# Patient Record
Sex: MALE | Race: WHITE | Employment: OTHER | URBAN - METROPOLITAN AREA
[De-identification: names, ages, dates, MRNs, and addresses within clinical notes are randomized per-mention and may not be internally consistent; named-entity substitution may affect disease eponyms.]

---

## 2017-01-09 RX ORDER — VENLAFAXINE HYDROCHLORIDE 75 MG/1
CAPSULE, EXTENDED RELEASE ORAL
Qty: 180 CAPSULE | Refills: 3 | Status: SHIPPED | OUTPATIENT
Start: 2017-01-09 | End: 2017-11-02 | Stop reason: SDUPTHER

## 2017-01-13 ENCOUNTER — PROCEDURE VISIT (OUTPATIENT)
Dept: SURGERY | Facility: CLINIC | Age: 77
End: 2017-01-13
Payer: COMMERCIAL

## 2017-01-13 VITALS
BODY MASS INDEX: 22.54 KG/M2 | HEIGHT: 66 IN | WEIGHT: 140.25 LBS | DIASTOLIC BLOOD PRESSURE: 68 MMHG | TEMPERATURE: 98 F | SYSTOLIC BLOOD PRESSURE: 124 MMHG | HEART RATE: 74 BPM

## 2017-01-13 DIAGNOSIS — D22.61 ATYPICAL NEVUS OF RIGHT SHOULDER: Primary | ICD-10-CM

## 2017-01-13 PROCEDURE — 88307 TISSUE EXAM BY PATHOLOGIST: CPT | Performed by: PATHOLOGY

## 2017-01-13 PROCEDURE — 12032 INTMD RPR S/A/T/EXT 2.6-7.5: CPT | Mod: S$GLB,,, | Performed by: SURGERY

## 2017-01-13 PROCEDURE — 11603 EXC TR-EXT MAL+MARG 2.1-3 CM: CPT | Mod: 51,S$GLB,, | Performed by: SURGERY

## 2017-01-14 NOTE — PROCEDURES
DATE OF PROCEDURE:  Friday, 01/13/2017    PRIMARY SURGEON:  Antonio Palacios M.D.    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    PROCEDURE:  A 5-mm wide local excision of the 1.5 cm moderately atypical nevus   of the right shoulder with specimen of resection measuring 2.5 cm x 6 cm in   length with 2.5 cm needed to achieve appropriately clear margins; primary   closure of the 2.5 x 6 cm defect in multiple layers of absorbable suture using 3   layers of absorbable suture for a 6 cm long incision.    PROCEDURE IN DETAIL:  The patient underwent informed consent.  The area was   confirmed and marked with the patient.  He was brought to the minor procedure   room.  He was placed in a supine position.  The 1.5 cm lesion was on the   superior anterior aspect of his right shoulder.  The area was prepped and draped   in a sterile fashion.  A transverse ellipse was marked to achieve a 5 cm margin   and again the resection specimen measured 2.5 x 6 cm.  Local anesthesia was   infiltrated.  Once appropriately anesthetized, skin incision was made sharply.    Full-thickness skin and subcutaneous tissue was taken.  The underlying fascia   was seen as this was very superficial on the superior anterior aspect of the   shoulder.  The specimen was removed with cautery and oriented with a short   stitch anteriorly and a long stitch on the right lateral side.  It was submitted   to pathology for permanent sectioning.  Hemostasis was achieved with cautery.    The deep dermal and subcutaneous layers were reapproximated with intermittent   deep three-point fixation down to the posterior deep fascial layer to close off   and obliterate any potential dead space to minimize  postoperative fluid seroma   collection and dead space.  Once the subcutaneous tissue was reapproximated, the   deep dermal layer was further reapproximated with interrupted 3-0 Vicryl   sutures as had been the initial deep layer with the skin approximated without   tension.  The skin was then closed with a running 4-0 Monocryl subcuticular skin   closure to achieve a three-layer closure.  The length of the incision was 6 cm.    Mastisol, Steri-Strips, sterile Telfa gauze and Tegaderm dressing were   applied.  Estimated blood loss was minimal.  All needle, instrument and sponge   counts were correct.  The specimen was sent to Pathology for permanent   sectioning.  Postop wound care and analgesic instructions were provided to the   patient.  The patient will follow up in clinic in approximately two weeks,   p.r.n. for wound check and pathology review.  We will also phone review his   pathology report.      TWILA/BOO  dd: 01/14/2017 07:18:47 (CST)  td: 01/14/2017 15:21:38 (CST)  Doc ID   #[id number]  Job ID #[id number]    CC:     Job # [id number]

## 2017-01-14 NOTE — PATIENT INSTRUCTIONS
Keep wound covered for 48 hours.  May shower directly over Tegaderm (plastic).  Remove Tegaderm after 48 hours or sooner if underlying gauze gets wet while washing/showering.  After 48 hours you may shower directly over steri-strips which you can remove after 7-10 days or allow them to fall off on their own. If they fall off sooner, that is ok, and you may shower directly over wound.  May use Tylenol or ibuprofen as directed for pain.  You should have a follow up appointment in 2-3 weeks for wound check and pathology review.  You may need to follow up sooner if you have sutures that require removal.           Please call clinic at [phone] for any questions or signs of infection such as redness, swelling, abnormal drainage,pain, tenderness, or fever.

## 2017-01-17 DIAGNOSIS — Z12.11 SPECIAL SCREENING FOR MALIGNANT NEOPLASMS, COLON: Primary | ICD-10-CM

## 2017-01-17 RX ORDER — SODIUM, POTASSIUM,MAG SULFATES 17.5-3.13G
1 SOLUTION, RECONSTITUTED, ORAL ORAL AS DIRECTED
Qty: 354 ML | Refills: 0 | Status: SHIPPED | OUTPATIENT
Start: 2017-01-17 | End: 2019-04-04 | Stop reason: SDUPTHER

## 2017-01-20 ENCOUNTER — TELEPHONE (OUTPATIENT)
Dept: ENDOSCOPY | Facility: HOSPITAL | Age: 77
End: 2017-01-20

## 2017-01-20 NOTE — TELEPHONE ENCOUNTER
"Spoke with Pt."s wife of her concerns of ingredients in Prep solution causing allergic reaction for Mr. Rooney. Pt. Instructed to speak with Pharmacist regarding specific ingredients with relation to pt"s allergies. She will call back on 01/23/17 if change in prep is needed.. EC  "

## 2017-01-24 ENCOUNTER — TELEPHONE (OUTPATIENT)
Dept: PHARMACY | Facility: CLINIC | Age: 77
End: 2017-01-24

## 2017-02-01 RX ORDER — CIPROFLOXACIN 500 MG/1
TABLET ORAL
Qty: 90 TABLET | Refills: 0 | Status: SHIPPED | OUTPATIENT
Start: 2017-02-01 | End: 2019-03-25

## 2017-02-03 ENCOUNTER — OFFICE VISIT (OUTPATIENT)
Dept: PAIN MEDICINE | Facility: CLINIC | Age: 77
End: 2017-02-03
Attending: ANESTHESIOLOGY
Payer: COMMERCIAL

## 2017-02-03 VITALS
DIASTOLIC BLOOD PRESSURE: 71 MMHG | HEART RATE: 71 BPM | WEIGHT: 150.38 LBS | HEIGHT: 66 IN | RESPIRATION RATE: 18 BRPM | SYSTOLIC BLOOD PRESSURE: 119 MMHG | TEMPERATURE: 99 F | BODY MASS INDEX: 24.17 KG/M2

## 2017-02-03 DIAGNOSIS — K52.9 CHRONIC DIARRHEA: ICD-10-CM

## 2017-02-03 DIAGNOSIS — M47.899 FACET SYNDROME: ICD-10-CM

## 2017-02-03 DIAGNOSIS — K50.80 CROHN'S DISEASE OF BOTH SMALL AND LARGE INTESTINE WITHOUT COMPLICATION: ICD-10-CM

## 2017-02-03 DIAGNOSIS — Z79.899 ENCOUNTER FOR LONG-TERM (CURRENT) USE OF HIGH-RISK MEDICATION: ICD-10-CM

## 2017-02-03 DIAGNOSIS — R53.81 PHYSICAL DECONDITIONING: ICD-10-CM

## 2017-02-03 DIAGNOSIS — F11.20 UNCOMPLICATED OPIOID DEPENDENCE: Primary | ICD-10-CM

## 2017-02-03 DIAGNOSIS — M79.10 MYALGIA: ICD-10-CM

## 2017-02-03 PROCEDURE — 1159F MED LIST DOCD IN RCRD: CPT | Mod: S$GLB,,, | Performed by: ANESTHESIOLOGY

## 2017-02-03 PROCEDURE — 99999 PR PBB SHADOW E&M-EST. PATIENT-LVL IV: CPT | Mod: PBBFAC,,, | Performed by: ANESTHESIOLOGY

## 2017-02-03 PROCEDURE — 3078F DIAST BP <80 MM HG: CPT | Mod: S$GLB,,, | Performed by: ANESTHESIOLOGY

## 2017-02-03 PROCEDURE — 1125F AMNT PAIN NOTED PAIN PRSNT: CPT | Mod: S$GLB,,, | Performed by: ANESTHESIOLOGY

## 2017-02-03 PROCEDURE — 1160F RVW MEDS BY RX/DR IN RCRD: CPT | Mod: S$GLB,,, | Performed by: ANESTHESIOLOGY

## 2017-02-03 PROCEDURE — 3074F SYST BP LT 130 MM HG: CPT | Mod: S$GLB,,, | Performed by: ANESTHESIOLOGY

## 2017-02-03 PROCEDURE — 1157F ADVNC CARE PLAN IN RCRD: CPT | Mod: S$GLB,,, | Performed by: ANESTHESIOLOGY

## 2017-02-03 PROCEDURE — 99214 OFFICE O/P EST MOD 30 MIN: CPT | Mod: S$GLB,,, | Performed by: ANESTHESIOLOGY

## 2017-02-03 RX ORDER — OXYCODONE AND ACETAMINOPHEN 10; 325 MG/1; MG/1
1 TABLET ORAL EVERY 8 HOURS PRN
Qty: 270 TABLET | Refills: 0 | Status: SHIPPED | OUTPATIENT
Start: 2017-02-03 | End: 2017-05-05 | Stop reason: SDUPTHER

## 2017-02-03 NOTE — PROGRESS NOTES
Referring Physician: No ref. provider found    Chief Complaint:   Chief Complaint   Patient presents with    Back Pain     3 month follow up        SUBJECTIVE: Disclaimer: This note has been generated using voice-recognition software. There may be typographical errors that have been missed during proof-reading    Interval History 2/3/2017:  Patient here for follow up of his chronic LBP and medication refill. Requesting 3 month refill of his current prescription of Percocet  PO q8 hr. This dose controls his pain to a level which allows him to be active and do yard work at his house in the [facility]. Pain is rated at a 8-9/10. The pain's intensity and character are stable compared to previous visits as outlined in earlier notes. No negative side effects noted in relation to his pain medication. Remains on Humira for his Crohn's which controls his disease well. 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     Interval History 11/11/16  Patient here for follow up with LBP and medication refill. Patient presents for 6 month medication follow-up. Patient continues to take Percocet  mg PO q 8 hr, which controls his pain. Patient reports that his pain is a 9/10 which is normal for him. Patient's pain remains stable in character and intensity as outlined in in previous encounters. Patient remains active and is able to do yard work outside at his house in the [facility]. Patient does not report of any negative side effects of his pain medication. Patient does not report of any new complications or concerns. Patient remains on Humira for his severe Crohn's disease which has improved his GI symptoms. Patient remains stable on home O2.     Interval History 03/18/2016:  Mr. Rooney presents today for follow up with lower back pain.   He has a history of multiple compression fractures treated in the past.   At the time of his compression fractures, he developed a  right-sided hemidiaphragmatic paralysis and has been on continuous oxygen since.  He is here today for 3 month medication follow up.  He is currently on Percocet with helps with him pain.  He reports that it allows him to do more and denies any adverse effects.   He is currently on Humira for Crohn's which he reports have been helping with his flare ups.  His pain today is a 10/10.     Interval History 12/18/2015:  Patient presents in clinic for up. Mr. Rooney is experiencing lower back pain, and states his pain is 9/10 today. He is currently taking Percocet for his symptoms.  In addition the patient recently had flareup of his Crohn's disease and has been started on Humira which has been helping significantly for his symptoms.Medications continue to medications allow improved functionality for daily living      Interval History 09/25/2015:  Patient presents in clinic for three month follow up. He states his back pain is an 8/10 today. Patient is taking percocet for pain and reports no other health changes since previous encounter.     Interval history 06/12/2015:  Patient in clinic for three month follow up. No health changes since previous encounter, continues to take percoset for pain, no changes in his pain.  Patient is not a current candidate for injections.    Interval History 03/13/2015:  Patient presents in clinic for 3 month follow up. Patient reports back pain is a 9/10 today and is consistently a 9-10/10. Patient currently takes percocet for pain. Patient reports no other health changes since previous encounter.  The medication has been helping him significantly without adverse effects.    Interval History 12/19/2014:  Patient presents in clinic for follow up. Reports back pain is an 8/10 today. Also reports pain in left knee and abdominal LLQ. Patient currently taking percocet 10-325mg for pain. Patient report no other health changes since his previous encounter.    Initial encounter:    Garry Rooney  presents to the clinic for the evaluation of diffuse back pain. The pain started approximately 12 years ago following sustaining 11 compression fractures of the thoraco-lumbar spine secondary to decreased bone density associated with treatment for Crohn's disease.and symptoms have been unchanged.    Brief history: the patient has been maintained on opioid medications for the last 12 years on a stable dose of oxycodone/acetaminophen 10/650 3 times a day.  He has not tried multiple alternative medications.  He has had a previous series of vertebroplasties at approximately 4 levels before being told that further vertebroplasties are not possible.  He has remained fixated on the idea that the opioid medications are the only thing that can help fix his pain and currently he is functional with this regimen.    At the time of his compression fractures the patient developed a right-sided hemidiaphragmatic paralysis and has been on continuous oxygen since.  He does not have any intrinsic lung disease.    Complicating matters, the patient does not live in [place] continuously, he lives for greater than half the year in the [place]  And comes for short periods of time in the next return visit will be March of 2015      Pain Description:    The pain is located throughout the thoacolumbar spine without radiation into the lower extremities..      At BEST  9/10     At WORST  10/10 on the WORST day.      On average pain is rated as 10/10.     Today the pain is rated as 9/10    The pain is described as shooting, throbbing and grabbing and deep      Symptoms interfere with daily activity, sleeping and work.     Exacerbating factors: Sitting, Standing, Laying, Bending, Touching, Coughing/Sneezing, Eating, Walking, Night Time, Morning, Extension, Flexing, Lifting and Getting out of bed/chair.      Mitigating factors medications.     Patient denies night fever/night sweats, urinary incontinence, bowel  incontinence, significant weight loss, significant motor weakness and loss of sensations.  Patient denies any suicidal or homicidal ideations    Pain Medications:  Current:  Venlafaxine 75 mg  Oxycodone/acetaminophen 10/325 3 times a day when necessary    Tried in Past:  NSAIDs -Never  TCA -Never  Anti-convulsants -Never      Physical Therapy/Home Exercise: no       report:  Reviewed and consistent with medication use as prescribed.    Pain Procedures: previous vertebroplasty, nothing recent      Past Medical History   Diagnosis Date    Anemia     Bladder cancer     Cataract     COPD (chronic obstructive pulmonary disease)     Crohn's disease     Depression     Encounter for long-term (current) use of high-risk medication     Hypertension     Osteoporosis, unspecified      Past Surgical History   Procedure Laterality Date    Colon surgery       2002    Colonoscopy       2010    Appendectomy      Hernia repair      Colonoscopy N/A 10/15/2015     Procedure: COLONOSCOPY;  Surgeon: Julio César Lackey MD;  Location: [facility] ([address] [place] [address]);  Service: Endoscopy;  Laterality: N/A;     Social History     Social History    Marital status:      Spouse name: N/A    Number of children: N/A    Years of education: N/A     Occupational History    Not on file.     Social History Main Topics    Smoking status: Never Smoker    Smokeless tobacco: Never Used    Alcohol use Yes      Comment: ocassionally - rarely    Drug use: No    Sexual activity: Not on file     Other Topics Concern    Not on file     Social History Narrative     Family History   Problem Relation Age of Onset    Irritable bowel syndrome Sister     Crohn's disease Neg Hx     Colon cancer Neg Hx     Amblyopia Neg Hx     Blindness Neg Hx     Thyroid disease Neg Hx     Stroke Neg Hx     Glaucoma Neg Hx     Cataracts Neg Hx     Retinal detachment Mother     Diabetes Maternal Aunt     Diabetes Maternal Uncle        Review of patient's  allergies indicates:   Allergen Reactions    Fosamax [alendronate]     Reclast [zoledronic acid-mannitol-water]     Sulfa (sulfonamide antibiotics)        Current Outpatient Prescriptions   Medication Sig    adalimumab (HUMIRA) 40 mg/0.8 mL injection Inject 0.8 mLs (40 mg total) into the skin every 14 (fourteen) days.    ASACOL  mg TbEC TAKE 1 TABLET (800MG TOTAL) THREE TIMES DAILY    azathioprine (IMURAN) 50 mg Tab Take 5 tablets (250 mg total) by mouth once daily.    ciprofloxacin HCl (CIPRO) 500 MG tablet TAKE 1 TABLET TWICE DAILY    ciprofloxacin HCl (CIPRO) 500 MG tablet Take 1 tablet (500 mg total) by mouth once daily.    ciprofloxacin HCl (CIPRO) 500 MG tablet TAKE 1 TABLET  (500  MG  TOTAL) ONE TIME DAILY    finasteride (PROSCAR) 5 mg tablet TAKE 1 TABLET ONE TIME DAILY    HUMIRA PEN PnKt injection INJECT 0.8 MLS (40MG) INTO THE SKIN EVERY 14 DAYS    hyoscyamine (LEVBID) 0.375 mg Tb12 Take 1 tablet (0.375 mg total) by mouth every 12 (twelve) hours.    lisinopril 10 MG tablet TAKE 1 TABLET ONE TIME DAILY    oxycodone-acetaminophen (PERCOCET)  mg per tablet Take 1 tablet by mouth every 8 (eight) hours as needed for Pain.    sodium,potassium,mag sulfates (SUPREP BOWEL PREP KIT) 17.5-3.13-1.6 gram SolR Take 1 kit by mouth as directed.    venlafaxine (EFFEXOR-XR) 75 MG 24 hr capsule TAKE 1 CAPSULE TWICE DAILY     No current facility-administered medications for this visit.        REVIEW OF SYSTEMS:    GENERAL:  No weight loss, malaise or fevers.  RESPIRATORY:  Negative for cough, wheezing or shortness of breath, patient denies any recent URI. Patient is on continuous oxygen secondary to right-sided diaphragmatic paralysis.  CARDIOVASCULAR:  Negative for chest pain, leg swelling or palpitations.  GI:  Crohn's flares are better controlled Humira  MUSCULOSKELETAL:  See HPI.  SKIN:  Negative for lesions, rash, and itching.  PSYCH: Patient has a history of depression.  Patient's sleep is  "disturbed secondary to pain.  HEMATOLOGY/LYMPHOLOGY:  Negative for prolonged bleeding, bruising easily or swollen nodes.  Patient is not currently taking any anti-coagulants  ENDO: No history of diabetes or thyroid dysfunction  NEURO:   No history of headaches, syncope, paralysis, seizures or tremors.  All other reviewed and negative other than HPI.    OBJECTIVE:    Visit Vitals    Resp 18    Ht 5' 6" (1.676 m)    Wt 68.2 kg (150 lb 5.7 oz)    BMI 24.27 kg/m2       PHYSICAL EXAMINATION:    GENERAL: frail and cachectic, in no acute distress, alert and oriented x3.  PSYCH:  Mood and affect appropriate.  SKIN: Skin color, texture, turgor normal, no rashes or lesions.  HEAD/FACE:  Normocephalic, atraumatic.   NECK: Pain to palpation over the cervical paraspinals.  No pain with neck flexion, extension, or lateral flexion.   CV: RRR with palpation of the radial artery.  PULM: Patient on continuous O2.  BACK: Patient with severe kyphosis. There is pain with palpation throughout the paraspinal muscles of the lumbar and thoracic spine.  Limited ROM to flexion and extension with pain reproduction.  EXTREMITIES:No deformities, edema, or skin discoloration. Good capillary refill.  NEURO: cranial nerves grossly intact  GAIT: antalgic, ambulates with cane.    ASSESSMENT: 76 y.o. year old male with back pain, consistent with the following diagnoses    Encounter Diagnoses   Name Primary?    Uncomplicated opioid dependence Yes    Physical deconditioning     Myalgia     Facet syndrome     Crohn's disease of both small and large intestine without complication      PLAN:   No interventions at this time.    Continue Humira    Continue home exercise routine.    Refill Oxycodone/Acetaminophen 10/325 q8 hour PO PRN, quantity #270 for 3 months. His wife will call in 3 months and  an additional prescription.    The patient is here today for a refill of current pain medications and they believe these provide effective pain " control and improvements in quality of life by at least 30%.  The patient notes no serious side effects, and feels the benefits outweigh the risks.  The patient was reminded of the pain contract that they signed previously as well as the risks and benefits of the medication including possible death.  The updated [place] Board [employer] prescription monitoring program was reviewed, and the patient has been found to be compliant with current treatment plan.    Follow up in 3 months for medication refill      Bubba Sandoval MD  PGY-3 Anesthesiology  P: [phone]    Cliff Carrillo  02/03/2017

## 2017-02-03 NOTE — MR AVS SNAPSHOT
[facility]  [address]  [facility] [address]  Phone: [phone]  Fax: [phone]                  Garry Rooney   2/3/2017 3:00 PM   Office Visit    Description:  Male : 1940   Provider:  Cliff Carrillo MD   Department:  [facility]           Reason for Visit     Back Pain           Diagnoses this Visit        Comments    Uncomplicated opioid dependence    -  Primary     Physical deconditioning         Myalgia         Facet syndrome         Crohn's disease of both small and large intestine without complication         Encounter for long-term (current) use of high-risk medication         Chronic diarrhea                To Do List           Your Future Surgeries/Procedures     2017   Surgery with Julio César Lackey MD   [facility]    [address] [facility] [address] [phone]              Goals (5 Years of Data)     None       These Medications        Disp Refills Start End    oxycodone-acetaminophen (PERCOCET)  mg per tablet 270 tablet 0 2/3/2017     Take 1 tablet by mouth every 8 (eight) hours as needed for Pain. - Oral    [facility] - [address] Ph #: [phone]         [facility]     [facility] Nurse Care Line -  Assistance  Registered nurses in the [facility] Center provide clinical advisement, health education, appointment booking, and other advisory services.  Call for this free service at [phone].             Medications           Message regarding Medications     Verify the changes and/or additions to your medication regime listed below are the same as discussed with your clinician today.  If any of these changes or additions are incorrect, please notify your healthcare provider.             Verify that the below list of medications is an accurate representation of the medications you are  "currently taking.  If none reported, the list may be blank. If incorrect, please contact your healthcare provider. Carry this list with you in case of emergency.           Current Medications     adalimumab (HUMIRA) 40 mg/0.8 mL injection Inject 0.8 mLs (40 mg total) into the skin every 14 (fourteen) days.    ASACOL  mg TbEC TAKE 1 TABLET (800MG TOTAL) THREE TIMES DAILY    azathioprine (IMURAN) 50 mg Tab Take 5 tablets (250 mg total) by mouth once daily.    ciprofloxacin HCl (CIPRO) 500 MG tablet TAKE 1 TABLET TWICE DAILY    ciprofloxacin HCl (CIPRO) 500 MG tablet Take 1 tablet (500 mg total) by mouth once daily.    ciprofloxacin HCl (CIPRO) 500 MG tablet TAKE 1 TABLET  (500  MG  TOTAL) ONE TIME DAILY    finasteride (PROSCAR) 5 mg tablet TAKE 1 TABLET ONE TIME DAILY    HUMIRA PEN PnKt injection INJECT 0.8 MLS (40MG) INTO THE SKIN EVERY 14 DAYS    hyoscyamine (LEVBID) 0.375 mg Tb12 Take 1 tablet (0.375 mg total) by mouth every 12 (twelve) hours.    lisinopril 10 MG tablet TAKE 1 TABLET ONE TIME DAILY    oxycodone-acetaminophen (PERCOCET)  mg per tablet Take 1 tablet by mouth every 8 (eight) hours as needed for Pain.    sodium,potassium,mag sulfates (SUPREP BOWEL PREP KIT) 17.5-3.13-1.6 gram SolR Take 1 kit by mouth as directed.    venlafaxine (EFFEXOR-XR) 75 MG 24 hr capsule TAKE 1 CAPSULE TWICE DAILY           Clinical Reference Information           Your Vitals Were     BP Pulse Temp Resp Height Weight    119/71 71 98.9 °F (37.2 °C) (Oral) 18 5' 6" (1.676 m) 68.2 kg (150 lb 5.7 oz)    BMI                24.27 kg/m2          Blood Pressure          Most Recent Value    BP  119/71      Allergies as of 2/3/2017     Fosamax [Alendronate]    Reclast [Zoledronic Acid-mannitol-water]    Sulfa (Sulfonamide Antibiotics)      Immunizations Administered on Date of Encounter - 2/3/2017     None      Language Assistance Services     ATTENTION: Language assistance services are available, free of charge. Please call " [phone].      ATENCIÓN: Si habla español, tiene a palacios disposición servicios gratuitos de asistencia lingüística. Llame al [phone].     CHÚ Ý: N?u b?n nói Ti?ng Vi?t, có các d?ch v? h? tr? ngôn ng? mi?n phí dành cho b?n. G?i s? [phone].         [facility] complies with applicable Federal civil rights laws and does not discriminate on the basis of race, color, national origin, age, disability, or sex.

## 2017-02-11 RX ORDER — MESALAMINE 800 MG/1
TABLET, DELAYED RELEASE ORAL
Qty: 270 TABLET | Refills: 1 | Status: SHIPPED | OUTPATIENT
Start: 2017-02-11 | End: 2017-02-13 | Stop reason: SDUPTHER

## 2017-02-13 ENCOUNTER — TELEPHONE (OUTPATIENT)
Dept: GASTROENTEROLOGY | Facility: CLINIC | Age: 77
End: 2017-02-13

## 2017-02-13 DIAGNOSIS — K50.80 CROHN'S DISEASE OF BOTH SMALL AND LARGE INTESTINE WITHOUT COMPLICATION: Primary | ICD-10-CM

## 2017-02-13 DIAGNOSIS — Z79.899 ENCOUNTER FOR LONG-TERM (CURRENT) USE OF HIGH-RISK MEDICATION: ICD-10-CM

## 2017-02-13 DIAGNOSIS — K50.819 CROHN'S DISEASE OF BOTH SMALL AND LARGE INTESTINE WITH COMPLICATION: ICD-10-CM

## 2017-02-13 RX ORDER — MESALAMINE 800 MG/1
800 TABLET, DELAYED RELEASE ORAL 3 TIMES DAILY
Qty: 45 TABLET | Refills: 1 | Status: SHIPPED | OUTPATIENT
Start: 2017-02-13 | End: 2017-07-13

## 2017-02-13 RX ORDER — CIPROFLOXACIN 500 MG/1
500 TABLET ORAL DAILY
Qty: 90 TABLET | Refills: 0 | Status: SHIPPED | OUTPATIENT
Start: 2017-02-13 | End: 2017-05-14

## 2017-02-13 RX ORDER — MESALAMINE 800 MG/1
800 TABLET, DELAYED RELEASE ORAL 3 TIMES DAILY
Qty: 270 TABLET | Refills: 1 | Status: SHIPPED | OUTPATIENT
Start: 2017-02-13 | End: 2017-07-13 | Stop reason: SDUPTHER

## 2017-02-13 NOTE — TELEPHONE ENCOUNTER
----- Message from Julio César Lackey MD sent at 2/13/2017 10:20 AM CST -----  Contact: Self- [phone]  done  ----- Message -----     From: Cee Nichols MA     Sent: 2/13/2017   8:35 AM       To: MD Romy Dominguez, the pharmacy is listed first in EPIC.  ----- Message -----     From: Estelle Hadley     Sent: 2/13/2017   8:30 AM       To: Ziyad Lackey- pt called to speak with Frida about getting a refill on two rxs- ASACOL  mg TbEC- ciprofloxacin HCl (CIPRO) 500 MG tablet pt has enough for another three weeks- pt uses [facility] - [place], [place] - [address] [phone]  - please call pt back at [phone]

## 2017-02-15 ENCOUNTER — TELEPHONE (OUTPATIENT)
Dept: PHARMACY | Facility: CLINIC | Age: 77
End: 2017-02-15

## 2017-02-16 ENCOUNTER — ANESTHESIA (OUTPATIENT)
Dept: ENDOSCOPY | Facility: HOSPITAL | Age: 77
End: 2017-02-16
Payer: COMMERCIAL

## 2017-02-16 ENCOUNTER — ANESTHESIA EVENT (OUTPATIENT)
Dept: ENDOSCOPY | Facility: HOSPITAL | Age: 77
End: 2017-02-16
Payer: COMMERCIAL

## 2017-02-16 ENCOUNTER — SURGERY (OUTPATIENT)
Age: 77
End: 2017-02-16

## 2017-02-16 VITALS — RESPIRATION RATE: 12 BRPM

## 2017-02-16 PROCEDURE — 25000003 PHARM REV CODE 250: Performed by: NURSE ANESTHETIST, CERTIFIED REGISTERED

## 2017-02-16 PROCEDURE — 63600175 PHARM REV CODE 636 W HCPCS: Performed by: NURSE ANESTHETIST, CERTIFIED REGISTERED

## 2017-02-16 PROCEDURE — D9220A PRA ANESTHESIA: Mod: 33,ANES,, | Performed by: ANESTHESIOLOGY

## 2017-02-16 PROCEDURE — D9220A PRA ANESTHESIA: Mod: 33,CRNA,, | Performed by: NURSE ANESTHETIST, CERTIFIED REGISTERED

## 2017-02-16 RX ORDER — PROPOFOL 10 MG/ML
VIAL (ML) INTRAVENOUS
Status: DISCONTINUED | OUTPATIENT
Start: 2017-02-16 | End: 2017-02-16

## 2017-02-16 RX ORDER — PROPOFOL 10 MG/ML
VIAL (ML) INTRAVENOUS CONTINUOUS PRN
Status: DISCONTINUED | OUTPATIENT
Start: 2017-02-16 | End: 2017-02-16

## 2017-02-16 RX ORDER — LIDOCAINE HCL/PF 100 MG/5ML
SYRINGE (ML) INTRAVENOUS
Status: DISCONTINUED | OUTPATIENT
Start: 2017-02-16 | End: 2017-02-16

## 2017-02-16 RX ADMIN — PROPOFOL 125 MCG/KG/MIN: 10 INJECTION, EMULSION INTRAVENOUS at 07:02

## 2017-02-16 RX ADMIN — PROPOFOL 80 MG: 10 INJECTION, EMULSION INTRAVENOUS at 07:02

## 2017-02-16 RX ADMIN — LIDOCAINE HYDROCHLORIDE 50 MG: 20 INJECTION, SOLUTION INTRAVENOUS at 07:02

## 2017-02-16 NOTE — ANESTHESIA PREPROCEDURE EVALUATION
02/16/2017  Garry Rooney is a 76 y.o., male.    [facility] Anesthesia Evaluation    I have reviewed the Patient Summary Reports.        Review of Systems  Anesthesia Hx:  No problems with previous Anesthesia  History of prior surgery of interest to airway management or planning:  Denies Personal Hx of Anesthesia complications.   Cardiovascular:   Hypertension Denies MI.  Denies CAD.    Denies CABG/stent.     Pulmonary:   COPD 2 liters home oxygen   Renal/:  Renal/ Normal     Hepatic/GI:   Bowel Prep. Denies GERD.    Neurological:   Neuromuscular Disease,    Endocrine:  Endocrine Normal    Psych:   Psychiatric History depression          Physical Exam  General:  Well nourished    Airway/Jaw/Neck:  Airway Findings: Mouth Opening: Normal General Airway Assessment: Adult  Mallampati: I      Dental:  Dental Findings: Edentulous   Chest/Lungs:  Chest/Lungs Clear    Heart/Vascular:  Heart Findings: Normal            Anesthesia Plan  Type of Anesthesia, risks & benefits discussed:  Anesthesia Type:  general  Patient's Preference:   Intra-op Monitoring Plan:   Intra-op Monitoring Plan Comments:   Post Op Pain Control Plan:   Post Op Pain Control Plan Comments:   Induction:   IV  Beta Blocker:  Patient is not currently on a Beta-Blocker (No further documentation required).       Informed Consent: Patient understands risks and agrees with Anesthesia plan.  Questions answered. Anesthesia consent signed with patient.  ASA Score: 3     Day of Surgery Review of History & Physical:    H&P update referred to the provider.         Ready For Surgery From Anesthesia Perspective.

## 2017-02-16 NOTE — TRANSFER OF CARE
"Anesthesia Transfer of Care Note    Patient: Garry Rooney    Procedure(s) Performed: Procedure(s) (LRB):  COLONOSCOPY (N/A)    Patient location: PACU    Anesthesia Type: general    Transport from OR: Transported from OR on 2-3 L/min O2 by NC with adequate spontaneous ventilation    Post pain: adequate analgesia    Post assessment: no apparent anesthetic complications    Post vital signs: stable    Level of consciousness: awake    Nausea/Vomiting: no nausea/vomiting    Complications: none          Last vitals:   Visit Vitals    BP (!) 95/53    Pulse 78    Temp 36.8 °C (98.3 °F)    Resp 16    Ht 5' 5" (1.651 m)    Wt 60 kg (132 lb 5 oz)    SpO2 98%    BMI 22.02 kg/m2     "

## 2017-02-16 NOTE — ANESTHESIA POSTPROCEDURE EVALUATION
"Anesthesia Post Evaluation    Patient: Garry Rooney    Procedure(s) Performed: Procedure(s) (LRB):  COLONOSCOPY (N/A)    Final Anesthesia Type: general  Patient location during evaluation: GI PACU  Patient participation: Yes- Able to Participate  Level of consciousness: awake and alert and oriented  Post-procedure vital signs: reviewed and stable  Pain management: adequate  Airway patency: patent  PONV status at discharge: No PONV  Anesthetic complications: no      Cardiovascular status: blood pressure returned to baseline  Respiratory status: unassisted and spontaneous ventilation  Hydration status: euvolemic  Follow-up not needed.        Visit Vitals    /62    Pulse 77    Temp 36.8 °C (98.3 °F)    Resp 18    Ht 5' 5" (1.651 m)    Wt 60 kg (132 lb 5 oz)    SpO2 97%    BMI 22.02 kg/m2       Pain/Arabella Score: Pain Assessment Performed: Yes (2/16/2017  8:38 AM)  Presence of Pain: denies (2/16/2017  8:38 AM)  Arabella Score: 10 (2/16/2017  8:23 AM)      "

## 2017-02-17 ENCOUNTER — TELEPHONE (OUTPATIENT)
Dept: GASTROENTEROLOGY | Facility: CLINIC | Age: 77
End: 2017-02-17

## 2017-02-17 NOTE — TELEPHONE ENCOUNTER
----- Message from Julio César Lackey MD sent at 2/17/2017  9:42 AM CST -----  Please call, biopsy shows inflammation, but nothing unexpected

## 2017-02-21 ENCOUNTER — TELEPHONE (OUTPATIENT)
Dept: PHARMACY | Facility: CLINIC | Age: 77
End: 2017-02-21

## 2017-02-23 ENCOUNTER — TELEPHONE (OUTPATIENT)
Dept: ENDOSCOPY | Facility: HOSPITAL | Age: 77
End: 2017-02-23

## 2017-02-27 ENCOUNTER — TELEPHONE (OUTPATIENT)
Dept: PHARMACY | Facility: CLINIC | Age: 77
End: 2017-02-27

## 2017-02-27 NOTE — TELEPHONE ENCOUNTER
Patient's wife called to request we fill a 6 month supply of Mr. Rooney's Humira. She said she just got off the phone with the insurance and it will go into effect on [place] 3/5. I will run them on Monday 3/6 to make sure they all go through. She would like us to ship them out on Monday 3/6 to receive 3/7.

## 2017-03-06 ENCOUNTER — TELEPHONE (OUTPATIENT)
Dept: GASTROENTEROLOGY | Facility: CLINIC | Age: 77
End: 2017-03-06

## 2017-05-04 ENCOUNTER — TELEPHONE (OUTPATIENT)
Dept: PAIN MEDICINE | Facility: CLINIC | Age: 77
End: 2017-05-04

## 2017-05-04 NOTE — TELEPHONE ENCOUNTER
Spoke with pt wife Anne regarding pain medication refill        ----- Message from Gordy Bartholomew sent at 5/4/2017  2:00 PM CDT -----  Contact: Anne, patient's wife  X_  1st Request  _  2nd Request  _  3rd Request        Who: Anne, patient's wife    Why: Patient's wife called to state that patient is in need of a new prescription. Please call back to follow up.    What Number to Call Back: [phone] or [phone]    When to Expect a call back: (Before the end of the day)   -- if the call is after 12:00, the call back will be tomorrow.

## 2017-05-05 ENCOUNTER — TELEPHONE (OUTPATIENT)
Dept: PAIN MEDICINE | Facility: CLINIC | Age: 77
End: 2017-05-05

## 2017-05-05 DIAGNOSIS — Z79.899 ENCOUNTER FOR LONG-TERM (CURRENT) USE OF HIGH-RISK MEDICATION: ICD-10-CM

## 2017-05-05 DIAGNOSIS — M79.10 MYALGIA: ICD-10-CM

## 2017-05-05 DIAGNOSIS — K50.80 CROHN'S DISEASE OF BOTH SMALL AND LARGE INTESTINE WITHOUT COMPLICATION: ICD-10-CM

## 2017-05-05 DIAGNOSIS — M47.899 FACET SYNDROME: ICD-10-CM

## 2017-05-05 DIAGNOSIS — K52.9 CHRONIC DIARRHEA: ICD-10-CM

## 2017-05-05 DIAGNOSIS — R53.81 PHYSICAL DECONDITIONING: ICD-10-CM

## 2017-05-05 DIAGNOSIS — F11.20 UNCOMPLICATED OPIOID DEPENDENCE: ICD-10-CM

## 2017-05-05 RX ORDER — OXYCODONE AND ACETAMINOPHEN 10; 325 MG/1; MG/1
1 TABLET ORAL EVERY 8 HOURS PRN
Qty: 270 TABLET | Refills: 0 | Status: SHIPPED | OUTPATIENT
Start: 2017-05-05 | End: 2017-09-27 | Stop reason: SDUPTHER

## 2017-05-05 NOTE — TELEPHONE ENCOUNTER
Patient wife contacted office for a refill on his medicaton. Last filled  02/15/2017. Three month supply is given. Last office visit 02/03/2017. You okayed patient to have 6 month office visit.

## 2017-06-19 RX ORDER — CIPROFLOXACIN 500 MG/1
TABLET ORAL
Qty: 90 TABLET | Refills: 0 | Status: SHIPPED | OUTPATIENT
Start: 2017-06-19 | End: 2018-12-03 | Stop reason: SDUPTHER

## 2017-07-10 ENCOUNTER — TELEPHONE (OUTPATIENT)
Dept: GASTROENTEROLOGY | Facility: CLINIC | Age: 77
End: 2017-07-10

## 2017-07-10 NOTE — TELEPHONE ENCOUNTER
----- Message from Ivana Hsu MA sent at 7/10/2017 10:25 AM CDT -----  Contact: Mrs. Rooney  spouse  [phone]  States she needs to speak to you asap regarding her spouses medications that needs to be sent to her.  States she needs to speak to you 1st due to it needing to be sent to her sons address.      States the medications are cipro 500 mg #90 x3 refills.   And not generic for Asacol  mg #270 x 3 refills.  States they are willing to pay for the non generic of this medication since the non generic works better.

## 2017-07-13 ENCOUNTER — TELEPHONE (OUTPATIENT)
Dept: GASTROENTEROLOGY | Facility: CLINIC | Age: 77
End: 2017-07-13

## 2017-07-13 DIAGNOSIS — K50.80 CROHN'S DISEASE OF BOTH SMALL AND LARGE INTESTINE WITHOUT COMPLICATION: ICD-10-CM

## 2017-07-13 RX ORDER — MESALAMINE 800 MG/1
800 TABLET, DELAYED RELEASE ORAL 3 TIMES DAILY
Qty: 270 TABLET | Refills: 0 | Status: SHIPPED | OUTPATIENT
Start: 2017-07-13 | End: 2017-08-18 | Stop reason: SDUPTHER

## 2017-07-14 NOTE — TELEPHONE ENCOUNTER
Patient of Dr. Lackey's with Crohn's.  Received request for medication refills: cipro and Asacol.  Chart reviewed.  The last note stated that Dr. Lackey wanted to stop the Asacol, but it appears to have been refilled since then.  He also was trying to taper off the Cipro.  It is unclear to me if Cipro is still warranted.  I will refill the Asacol for now, but he needs to check back with Dr. Lackey regarding ongoing use.  I am not refilling the Cipro at this time.

## 2017-08-09 ENCOUNTER — TELEPHONE (OUTPATIENT)
Dept: PHARMACY | Facility: CLINIC | Age: 77
End: 2017-08-09
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
## 2017-08-18 DIAGNOSIS — K50.80 CROHN'S DISEASE OF BOTH SMALL AND LARGE INTESTINE WITHOUT COMPLICATION: ICD-10-CM

## 2017-08-21 RX ORDER — MESALAMINE 800 MG/1
TABLET, DELAYED RELEASE ORAL
Qty: 180 TABLET | Refills: 0 | Status: SHIPPED | OUTPATIENT
Start: 2017-08-21 | End: 2017-12-05 | Stop reason: SDUPTHER

## 2017-09-21 ENCOUNTER — TELEPHONE (OUTPATIENT)
Dept: GASTROENTEROLOGY | Facility: CLINIC | Age: 77
End: 2017-09-21
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
## 2017-09-25 ENCOUNTER — TELEPHONE (OUTPATIENT)
Dept: PHARMACY | Facility: CLINIC | Age: 77
End: 2017-09-25

## 2017-09-27 ENCOUNTER — OFFICE VISIT (OUTPATIENT)
Dept: PAIN MEDICINE | Facility: CLINIC | Age: 77
End: 2017-09-27
Payer: MEDICARE

## 2017-09-27 VITALS
TEMPERATURE: 98 F | SYSTOLIC BLOOD PRESSURE: 123 MMHG | WEIGHT: 132.25 LBS | HEIGHT: 65 IN | BODY MASS INDEX: 22.03 KG/M2 | DIASTOLIC BLOOD PRESSURE: 67 MMHG | HEART RATE: 75 BPM

## 2017-09-27 DIAGNOSIS — R53.81 PHYSICAL DECONDITIONING: ICD-10-CM

## 2017-09-27 DIAGNOSIS — K50.80 CROHN'S DISEASE OF BOTH SMALL AND LARGE INTESTINE WITHOUT COMPLICATION: ICD-10-CM

## 2017-09-27 DIAGNOSIS — F11.20 UNCOMPLICATED OPIOID DEPENDENCE: ICD-10-CM

## 2017-09-27 DIAGNOSIS — Z79.899 ENCOUNTER FOR LONG-TERM (CURRENT) USE OF HIGH-RISK MEDICATION: ICD-10-CM

## 2017-09-27 DIAGNOSIS — M47.899 FACET SYNDROME: Primary | ICD-10-CM

## 2017-09-27 DIAGNOSIS — M79.10 MYALGIA: ICD-10-CM

## 2017-09-27 DIAGNOSIS — K52.9 CHRONIC DIARRHEA: ICD-10-CM

## 2017-09-27 PROCEDURE — 99213 OFFICE O/P EST LOW 20 MIN: CPT | Mod: S$GLB,,, | Performed by: NURSE PRACTITIONER

## 2017-09-27 PROCEDURE — 3008F BODY MASS INDEX DOCD: CPT | Mod: S$GLB,,, | Performed by: NURSE PRACTITIONER

## 2017-09-27 PROCEDURE — 3078F DIAST BP <80 MM HG: CPT | Mod: S$GLB,,, | Performed by: NURSE PRACTITIONER

## 2017-09-27 PROCEDURE — 99999 PR PBB SHADOW E&M-EST. PATIENT-LVL III: CPT | Mod: PBBFAC,,, | Performed by: NURSE PRACTITIONER

## 2017-09-27 PROCEDURE — 1125F AMNT PAIN NOTED PAIN PRSNT: CPT | Mod: S$GLB,,, | Performed by: NURSE PRACTITIONER

## 2017-09-27 PROCEDURE — 1159F MED LIST DOCD IN RCRD: CPT | Mod: S$GLB,,, | Performed by: NURSE PRACTITIONER

## 2017-09-27 PROCEDURE — 3074F SYST BP LT 130 MM HG: CPT | Mod: S$GLB,,, | Performed by: NURSE PRACTITIONER

## 2017-09-27 RX ORDER — OXYCODONE AND ACETAMINOPHEN 10; 325 MG/1; MG/1
1 TABLET ORAL EVERY 8 HOURS PRN
Qty: 270 TABLET | Refills: 0 | Status: SHIPPED | OUTPATIENT
Start: 2017-09-27 | End: 2018-01-19 | Stop reason: SDUPTHER
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
## 2017-10-03 ENCOUNTER — TELEPHONE (OUTPATIENT)
Dept: GASTROENTEROLOGY | Facility: CLINIC | Age: 77
End: 2017-10-03

## 2017-10-03 NOTE — TELEPHONE ENCOUNTER
----- Message from Julio César Lackey MD sent at 10/3/2017 10:10 AM CDT -----  Contact: Wife- Anne- [phone]  How about 10/31 at 4 pm  ----- Message -----  From: Cee Nichols MA  Sent: 9/28/2017  10:02 AM  To: Julio César Lackey MD     Needs to reschedule his 12/11 appointment with you.  Going on a cruise.  Would like to be seen sooner.  Will not be available 11/11 thru 11/23.  Going back to [facility] to assess the situation. Would you be able to work him in somewhere.  Mrs. Rooney states he really needs to see you.  ----- Message -----  From: Estelle Hadley  Sent: 9/28/2017   9:39 AM  To: Ziyad Lackey- pts wife called to reschedule his upcoming appt- originally for 12/11- pt will be out of town- wanted to know if he can have an earlier appt if possible- please call Anne back at [phone]

## 2017-10-17 ENCOUNTER — TELEPHONE (OUTPATIENT)
Dept: PHARMACY | Facility: CLINIC | Age: 77
End: 2017-10-17

## 2017-10-31 ENCOUNTER — LAB VISIT (OUTPATIENT)
Dept: LAB | Facility: HOSPITAL | Age: 77
End: 2017-10-31
Attending: INTERNAL MEDICINE
Payer: MEDICARE

## 2017-10-31 ENCOUNTER — OFFICE VISIT (OUTPATIENT)
Dept: GASTROENTEROLOGY | Facility: CLINIC | Age: 77
End: 2017-10-31
Payer: MEDICARE

## 2017-10-31 VITALS
HEIGHT: 64 IN | DIASTOLIC BLOOD PRESSURE: 78 MMHG | BODY MASS INDEX: 22.99 KG/M2 | SYSTOLIC BLOOD PRESSURE: 130 MMHG | HEART RATE: 85 BPM | WEIGHT: 134.69 LBS

## 2017-10-31 DIAGNOSIS — K50.819 CROHN'S DISEASE OF BOTH SMALL AND LARGE INTESTINE WITH COMPLICATION: Primary | ICD-10-CM

## 2017-10-31 DIAGNOSIS — Z79.899 ENCOUNTER FOR LONG-TERM (CURRENT) USE OF HIGH-RISK MEDICATION: ICD-10-CM

## 2017-10-31 DIAGNOSIS — K50.819 CROHN'S DISEASE OF BOTH SMALL AND LARGE INTESTINE WITH COMPLICATION: ICD-10-CM

## 2017-10-31 LAB
ALBUMIN SERPL BCP-MCNC: 3.3 G/DL
ALP SERPL-CCNC: 71 U/L
ALT SERPL W/O P-5'-P-CCNC: 13 U/L
ANION GAP SERPL CALC-SCNC: 7 MMOL/L
AST SERPL-CCNC: 22 U/L
BASOPHILS # BLD AUTO: 0.02 K/UL
BASOPHILS NFR BLD: 0.3 %
BILIRUB SERPL-MCNC: 0.6 MG/DL
BUN SERPL-MCNC: 15 MG/DL
CALCIUM SERPL-MCNC: 8.9 MG/DL
CHLORIDE SERPL-SCNC: 104 MMOL/L
CO2 SERPL-SCNC: 30 MMOL/L
CREAT SERPL-MCNC: 0.8 MG/DL
CRP SERPL-MCNC: 8.5 MG/L
DIFFERENTIAL METHOD: ABNORMAL
EOSINOPHIL # BLD AUTO: 0.1 K/UL
EOSINOPHIL NFR BLD: 1.9 %
ERYTHROCYTE [DISTWIDTH] IN BLOOD BY AUTOMATED COUNT: 18.3 %
EST. GFR  (AFRICAN AMERICAN): >60 ML/MIN/1.73 M^2
EST. GFR  (NON AFRICAN AMERICAN): >60 ML/MIN/1.73 M^2
GLUCOSE SERPL-MCNC: 98 MG/DL
HCT VFR BLD AUTO: 36 %
HGB BLD-MCNC: 11.9 G/DL
IMM GRANULOCYTES # BLD AUTO: 0.04 K/UL
IMM GRANULOCYTES NFR BLD AUTO: 0.6 %
LYMPHOCYTES # BLD AUTO: 1.2 K/UL
LYMPHOCYTES NFR BLD: 17 %
MCH RBC QN AUTO: 37.1 PG
MCHC RBC AUTO-ENTMCNC: 33.1 G/DL
MCV RBC AUTO: 112 FL
MONOCYTES # BLD AUTO: 0.3 K/UL
MONOCYTES NFR BLD: 4.6 %
NEUTROPHILS # BLD AUTO: 5.3 K/UL
NEUTROPHILS NFR BLD: 75.6 %
NRBC BLD-RTO: 0 /100 WBC
PLATELET # BLD AUTO: 248 K/UL
PMV BLD AUTO: 10.7 FL
POTASSIUM SERPL-SCNC: 4.4 MMOL/L
PROT SERPL-MCNC: 6.8 G/DL
RBC # BLD AUTO: 3.21 M/UL
SODIUM SERPL-SCNC: 141 MMOL/L
WBC # BLD AUTO: 6.96 K/UL

## 2017-10-31 PROCEDURE — 99215 OFFICE O/P EST HI 40 MIN: CPT | Mod: S$GLB,,, | Performed by: INTERNAL MEDICINE

## 2017-10-31 PROCEDURE — 86140 C-REACTIVE PROTEIN: CPT

## 2017-10-31 PROCEDURE — 82542 COL CHROMOTOGRAPHY QUAL/QUAN: CPT | Mod: 91

## 2017-10-31 PROCEDURE — 80053 COMPREHEN METABOLIC PANEL: CPT

## 2017-10-31 PROCEDURE — 36415 COLL VENOUS BLD VENIPUNCTURE: CPT

## 2017-10-31 PROCEDURE — 85025 COMPLETE CBC W/AUTO DIFF WBC: CPT

## 2017-10-31 PROCEDURE — 99999 PR PBB SHADOW E&M-EST. PATIENT-LVL III: CPT | Mod: PBBFAC,,, | Performed by: INTERNAL MEDICINE
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
## 2017-11-01 ENCOUNTER — LAB VISIT (OUTPATIENT)
Dept: LAB | Facility: OTHER | Age: 77
End: 2017-11-01
Attending: INTERNAL MEDICINE
Payer: MEDICARE

## 2017-11-01 DIAGNOSIS — K50.819 CROHN'S DISEASE OF BOTH SMALL AND LARGE INTESTINE WITH COMPLICATION: ICD-10-CM

## 2017-11-01 LAB
C DIFF GDH STL QL: NEGATIVE
C DIFF TOX A+B STL QL IA: NEGATIVE

## 2017-11-01 PROCEDURE — 87449 NOS EACH ORGANISM AG IA: CPT

## 2017-11-01 PROCEDURE — 87329 GIARDIA AG IA: CPT

## 2017-11-01 PROCEDURE — 87209 SMEAR COMPLEX STAIN: CPT

## 2017-11-02 ENCOUNTER — TELEPHONE (OUTPATIENT)
Dept: GASTROENTEROLOGY | Facility: CLINIC | Age: 77
End: 2017-11-02

## 2017-11-02 DIAGNOSIS — K50.819 CROHN'S DISEASE OF SMALL AND LARGE INTESTINES WITH COMPLICATION: Primary | ICD-10-CM

## 2017-11-02 LAB
CRYPTOSP AG STL QL IA: NEGATIVE
G LAMBLIA AG STL QL IA: NEGATIVE

## 2017-11-02 NOTE — TELEPHONE ENCOUNTER
----- Message from Tara Katz sent at 11/2/2017  1:38 PM CDT -----  Contact: wife - torrie perez - [phone]  Ziyad - returning  Your call - when is patient supposed to see dr alcala - please call wife - torrie Landon [phone]

## 2017-11-02 NOTE — TELEPHONE ENCOUNTER
----- Message from Mariel Christy sent at 11/2/2017  1:00 PM CDT -----  Contact: pt#[phone]  Pt is returning your call. Please call back

## 2017-11-02 NOTE — TELEPHONE ENCOUNTER
----- Message from Choco Prasad sent at 11/2/2017  2:04 PM CDT -----      _  1st Request  _  2nd Request  _  3rd Request    Please refill the medication(s) listed below. Please call the patient when the prescription(s) is ready for  at this phone number    [phone] (Phone)        Medication #1 : lisinopril 10 MG tablet      Medication #2: venlafaxine (EFFEXOR-XR) 75 MG 24 hr capsule    Medication #3 :  finasteride (PROSCAR) 5 mg tablet            Preferred Pharmacy:[facility] - [facility] [address]

## 2017-11-02 NOTE — TELEPHONE ENCOUNTER
Returned call.  Aware lab work has been scheduled for next Friday 11/10 for 10:00.   Will mail confirmation.

## 2017-11-03 LAB — O+P STL TRI STN: NORMAL

## 2017-11-03 RX ORDER — LISINOPRIL 10 MG/1
TABLET ORAL
Qty: 90 TABLET | Refills: 0 | Status: SHIPPED | OUTPATIENT
Start: 2017-11-03 | End: 2018-01-16 | Stop reason: SDUPTHER

## 2017-11-03 RX ORDER — FINASTERIDE 5 MG/1
TABLET, FILM COATED ORAL
Qty: 90 TABLET | Refills: 0 | Status: SHIPPED | OUTPATIENT
Start: 2017-11-03 | End: 2018-01-16 | Stop reason: SDUPTHER

## 2017-11-03 RX ORDER — VENLAFAXINE HYDROCHLORIDE 75 MG/1
CAPSULE, EXTENDED RELEASE ORAL
Qty: 180 CAPSULE | Refills: 0 | Status: SHIPPED | OUTPATIENT
Start: 2017-11-03 | End: 2018-01-16 | Stop reason: SDUPTHER

## 2017-11-05 LAB — PROMETHEUS THIOPURINE METABOLITES: NORMAL

## 2017-11-06 ENCOUNTER — TELEPHONE (OUTPATIENT)
Dept: GASTROENTEROLOGY | Facility: CLINIC | Age: 77
End: 2017-11-06

## 2017-11-09 ENCOUNTER — TELEPHONE (OUTPATIENT)
Dept: PHARMACY | Facility: CLINIC | Age: 77
End: 2017-11-09

## 2017-11-10 ENCOUNTER — LAB VISIT (OUTPATIENT)
Dept: LAB | Facility: HOSPITAL | Age: 77
End: 2017-11-10
Attending: INTERNAL MEDICINE
Payer: MEDICARE

## 2017-11-10 DIAGNOSIS — K50.819 CROHN'S DISEASE OF SMALL AND LARGE INTESTINES WITH COMPLICATION: ICD-10-CM

## 2017-11-10 PROCEDURE — 80299 QUANTITATIVE ASSAY DRUG: CPT

## 2017-11-10 PROCEDURE — 36415 COLL VENOUS BLD VENIPUNCTURE: CPT

## 2017-11-12 ENCOUNTER — PATIENT MESSAGE (OUTPATIENT)
Dept: GASTROENTEROLOGY | Facility: CLINIC | Age: 77
End: 2017-11-12

## 2017-11-16 ENCOUNTER — TELEPHONE (OUTPATIENT)
Dept: GASTROENTEROLOGY | Facility: CLINIC | Age: 77
End: 2017-11-16

## 2017-11-17 LAB — ADALIMUMAB CONCENTRATION & ANTI-ADALIMUMAB ANTIBODY: NORMAL

## 2017-11-22 ENCOUNTER — TELEPHONE (OUTPATIENT)
Dept: GASTROENTEROLOGY | Facility: CLINIC | Age: 77
End: 2017-11-22

## 2017-11-22 NOTE — TELEPHONE ENCOUNTER
----- Message from Estelle Hadley sent at 11/22/2017 12:12 PM CST -----  Contact: Wife- Anne- [phone]  Ziyad- pts wife called to speak with Frida to discuss the pts medications-please call Anne back at [phone]

## 2017-11-27 DIAGNOSIS — Z79.899 ENCOUNTER FOR LONG-TERM (CURRENT) USE OF HIGH-RISK MEDICATION: ICD-10-CM

## 2017-11-27 DIAGNOSIS — K50.819 CROHN'S DISEASE OF BOTH SMALL AND LARGE INTESTINE WITH COMPLICATION: ICD-10-CM

## 2017-11-27 RX ORDER — AZATHIOPRINE 50 MG/1
TABLET ORAL
Qty: 450 TABLET | Refills: 3 | Status: SHIPPED | OUTPATIENT
Start: 2017-11-27 | End: 2018-12-03 | Stop reason: SDUPTHER

## 2017-11-27 RX ORDER — CIPROFLOXACIN 500 MG/1
TABLET ORAL
Qty: 90 TABLET | Refills: 1 | Status: SHIPPED | OUTPATIENT
Start: 2017-11-27 | End: 2019-03-25

## 2017-11-27 RX ORDER — HYOSCYAMINE SULFATE 0.38 MG/1
TABLET, EXTENDED RELEASE ORAL
Qty: 180 TABLET | Refills: 3 | Status: SHIPPED | OUTPATIENT
Start: 2017-11-27 | End: 2018-12-03 | Stop reason: SDUPTHER

## 2017-11-28 ENCOUNTER — TELEPHONE (OUTPATIENT)
Dept: PHARMACY | Facility: CLINIC | Age: 77
End: 2017-11-28

## 2017-11-28 ENCOUNTER — PATIENT OUTREACH (OUTPATIENT)
Dept: INTERNAL MEDICINE | Facility: CLINIC | Age: 77
End: 2017-11-28

## 2017-11-28 NOTE — TELEPHONE ENCOUNTER
----- Message from Julio César Lackey MD sent at 11/28/2017 10:15 AM CST -----  Contact: Self- [phone]  I thought I just did a new rx ;with the higher dose of once weekly  ----- Message -----  From: Cee Nichols MA  Sent: 11/28/2017   9:59 AM  To: Julio César Lackey MD        ----- Message -----  From: Estelle Hadley  Sent: 11/28/2017   9:55 AM  To: Ziyad WATKINS Staff                                  Prescription Refill Request  Name of medication and dose [facility]     Pharmacy [facility]     Are you completely out of your medication Yes    Additional Information:

## 2017-11-28 NOTE — PROGRESS NOTES
[facility] is committed to your overall health.  To help you get the most out of each of your visits, we will review your information to make sure you are up to date on all of your recommended tests and/or procedures.       Your PCP     found that you may be due for:       Health Maintenance Due   Topic Date Due    Zoster Vaccine  11/25/1998    Lipid Panel  10/28/2016    Pneumococcal (65+) (2 of 2 - PPSV23) 04/06/2017    Influenza Vaccine  08/01/2017     Medicare does not cover all immunizations to be given in the clinic. Check your benefits to ensure that you do not need to receive your immunizations at the pharmacy.  You are more than welcome to visit our pharmacy here on campus to receive your vaccinations on the same day of your upcoming scheduled visit.           If you have had any of the above done at another facility, please bring the records or information with you so that your record at [facility] will be complete.  If you would like to schedule any of these, please contact me.     If you are currently taking medication, please bring it with you to your appointment for review.     Also, if you have any type of Advanced Directives, please bring them with you to your office visit so we may scan them into your chart.     Thank you for Choosing [facility] for your healthcare needs.      Additional Information  If you have questions, you can email [email or web address] or call [phone]  to talk to our [facility] staff. Remember, [facility] is NOT to be used for urgent needs. For medical emergencies, dial 911.

## 2017-11-29 ENCOUNTER — TELEPHONE (OUTPATIENT)
Dept: PHARMACY | Facility: CLINIC | Age: 77
End: 2017-11-29

## 2017-11-29 NOTE — TELEPHONE ENCOUNTER
FOR DOCUMENTATION ONLY:  Humira prior authorization (weekly dosing) approved x 2 years.  11/29/17 through 11/29/19  Auth # [id number]

## 2017-12-05 DIAGNOSIS — K50.80 CROHN'S DISEASE OF BOTH SMALL AND LARGE INTESTINE WITHOUT COMPLICATION: ICD-10-CM

## 2017-12-05 RX ORDER — MESALAMINE 800 MG/1
800 TABLET, DELAYED RELEASE ORAL 3 TIMES DAILY
Qty: 180 TABLET | Refills: 0 | Status: SHIPPED | OUTPATIENT
Start: 2017-12-05 | End: 2018-01-31 | Stop reason: SDUPTHER

## 2017-12-20 ENCOUNTER — TELEPHONE (OUTPATIENT)
Dept: PHARMACY | Facility: CLINIC | Age: 77
End: 2017-12-20

## 2018-01-04 ENCOUNTER — PATIENT OUTREACH (OUTPATIENT)
Dept: INTERNAL MEDICINE | Facility: CLINIC | Age: 78
End: 2018-01-04

## 2018-01-04 NOTE — PROGRESS NOTES
[facility] is committed to your overall health.  To help you get the most out of each of your visits, we will review your information to make sure you are up to date on all of your recommended tests and/or procedures.       Your PCP    found that you may be due for:       Health Maintenance Due   Topic Date Due    Zoster Vaccine  11/25/1998    Lipid Panel  10/28/2016    Pneumococcal (65+) (2 of 2 - PPSV23) 04/06/2017    Influenza Vaccine  08/01/2017     Medicare does not cover all immunizations to be given in the clinic. Check your benefits to ensure that you do not need to receive your immunizations at the pharmacy.    You are more than welcome to visit our pharmacy here on campus to receive your vaccinations on the same day of your upcoming scheduled visit.           If you have had any of the above done at another facility, please bring the records or information with you so that your record at [facility] will be complete.  If you would like to schedule any of these, please contact me.     If you are currently taking medication, please bring it with you to your appointment for review.     Also, if you have any type of Advanced Directives, please bring them with you to your office visit so we may scan them into your chart.       Thank you for Choosing [facility] for your healthcare needs.        Additional Information  If you have questions, you can email [email or web address] or call [phone]  to talk to our [facility] staff. Remember, [facility] is NOT to be used for urgent needs. For medical emergencies, dial 911.

## 2018-01-17 RX ORDER — LISINOPRIL 10 MG/1
TABLET ORAL
Qty: 90 TABLET | Refills: 0 | Status: SHIPPED | OUTPATIENT
Start: 2018-01-17 | End: 2018-01-18 | Stop reason: SDUPTHER

## 2018-01-17 RX ORDER — VENLAFAXINE HYDROCHLORIDE 75 MG/1
CAPSULE, EXTENDED RELEASE ORAL
Qty: 180 CAPSULE | Refills: 0 | Status: SHIPPED | OUTPATIENT
Start: 2018-01-17 | End: 2018-01-18 | Stop reason: SDUPTHER

## 2018-01-17 RX ORDER — FINASTERIDE 5 MG/1
TABLET, FILM COATED ORAL
Qty: 90 TABLET | Refills: 0 | Status: SHIPPED | OUTPATIENT
Start: 2018-01-17 | End: 2018-01-18 | Stop reason: SDUPTHER

## 2018-01-18 ENCOUNTER — OFFICE VISIT (OUTPATIENT)
Dept: INTERNAL MEDICINE | Facility: CLINIC | Age: 78
End: 2018-01-18
Attending: INTERNAL MEDICINE
Payer: MEDICARE

## 2018-01-18 VITALS
HEIGHT: 64 IN | HEART RATE: 80 BPM | WEIGHT: 140 LBS | DIASTOLIC BLOOD PRESSURE: 70 MMHG | BODY MASS INDEX: 23.9 KG/M2 | SYSTOLIC BLOOD PRESSURE: 142 MMHG

## 2018-01-18 DIAGNOSIS — R09.02 HYPOXEMIA: ICD-10-CM

## 2018-01-18 DIAGNOSIS — I10 ESSENTIAL HYPERTENSION: ICD-10-CM

## 2018-01-18 DIAGNOSIS — Z13.6 ENCOUNTER FOR LIPID SCREENING FOR CARDIOVASCULAR DISEASE: Primary | ICD-10-CM

## 2018-01-18 DIAGNOSIS — F32.5 MAJOR DEPRESSIVE DISORDER WITH SINGLE EPISODE, IN FULL REMISSION: ICD-10-CM

## 2018-01-18 DIAGNOSIS — Z13.220 ENCOUNTER FOR LIPID SCREENING FOR CARDIOVASCULAR DISEASE: Primary | ICD-10-CM

## 2018-01-18 DIAGNOSIS — J98.6 DIAPHRAGMATIC DISORDER: ICD-10-CM

## 2018-01-18 PROCEDURE — 3008F BODY MASS INDEX DOCD: CPT | Mod: S$GLB,,, | Performed by: INTERNAL MEDICINE

## 2018-01-18 PROCEDURE — 99214 OFFICE O/P EST MOD 30 MIN: CPT | Mod: S$GLB,,, | Performed by: INTERNAL MEDICINE

## 2018-01-18 PROCEDURE — 99999 PR PBB SHADOW E&M-EST. PATIENT-LVL III: CPT | Mod: PBBFAC,,, | Performed by: INTERNAL MEDICINE

## 2018-01-18 PROCEDURE — 1159F MED LIST DOCD IN RCRD: CPT | Mod: S$GLB,,, | Performed by: INTERNAL MEDICINE

## 2018-01-18 PROCEDURE — 1125F AMNT PAIN NOTED PAIN PRSNT: CPT | Mod: S$GLB,,, | Performed by: INTERNAL MEDICINE

## 2018-01-18 RX ORDER — LISINOPRIL 10 MG/1
10 TABLET ORAL DAILY
Qty: 90 TABLET | Refills: 3 | Status: SHIPPED | OUTPATIENT
Start: 2018-01-18 | End: 2019-01-22

## 2018-01-18 RX ORDER — FINASTERIDE 5 MG/1
5 TABLET, FILM COATED ORAL DAILY
Qty: 90 TABLET | Refills: 3 | Status: SHIPPED | OUTPATIENT
Start: 2018-01-18 | End: 2019-01-22 | Stop reason: SDUPTHER

## 2018-01-18 RX ORDER — VENLAFAXINE HYDROCHLORIDE 75 MG/1
CAPSULE, EXTENDED RELEASE ORAL
Qty: 180 CAPSULE | Refills: 3 | Status: SHIPPED | OUTPATIENT
Start: 2018-01-18 | End: 2019-01-22 | Stop reason: SDUPTHER
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
## 2018-01-19 ENCOUNTER — OFFICE VISIT (OUTPATIENT)
Dept: PAIN MEDICINE | Facility: CLINIC | Age: 78
End: 2018-01-19
Attending: ANESTHESIOLOGY
Payer: MEDICARE

## 2018-01-19 VITALS
DIASTOLIC BLOOD PRESSURE: 68 MMHG | BODY MASS INDEX: 23.9 KG/M2 | SYSTOLIC BLOOD PRESSURE: 121 MMHG | WEIGHT: 140 LBS | HEART RATE: 74 BPM | HEIGHT: 64 IN

## 2018-01-19 DIAGNOSIS — K50.80 CROHN'S DISEASE OF BOTH SMALL AND LARGE INTESTINE WITHOUT COMPLICATION: ICD-10-CM

## 2018-01-19 DIAGNOSIS — M79.10 MYALGIA: ICD-10-CM

## 2018-01-19 DIAGNOSIS — M47.899 FACET SYNDROME: ICD-10-CM

## 2018-01-19 DIAGNOSIS — K52.9 CHRONIC DIARRHEA: ICD-10-CM

## 2018-01-19 DIAGNOSIS — R53.81 PHYSICAL DECONDITIONING: ICD-10-CM

## 2018-01-19 DIAGNOSIS — F11.20 UNCOMPLICATED OPIOID DEPENDENCE: ICD-10-CM

## 2018-01-19 DIAGNOSIS — Z79.899 ENCOUNTER FOR LONG-TERM (CURRENT) USE OF HIGH-RISK MEDICATION: ICD-10-CM

## 2018-01-19 PROCEDURE — 99999 PR PBB SHADOW E&M-EST. PATIENT-LVL IV: CPT | Mod: PBBFAC,,, | Performed by: ANESTHESIOLOGY

## 2018-01-19 PROCEDURE — 99214 OFFICE O/P EST MOD 30 MIN: CPT | Mod: S$GLB,,, | Performed by: ANESTHESIOLOGY

## 2018-01-19 RX ORDER — OXYCODONE AND ACETAMINOPHEN 10; 325 MG/1; MG/1
1 TABLET ORAL EVERY 8 HOURS PRN
Qty: 270 TABLET | Refills: 0 | Status: SHIPPED | OUTPATIENT
Start: 2018-01-19 | End: 2018-04-02 | Stop reason: SDUPTHER
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
## 2018-01-22 ENCOUNTER — TELEPHONE (OUTPATIENT)
Dept: PHARMACY | Facility: CLINIC | Age: 78
End: 2018-01-22

## 2018-01-22 NOTE — TELEPHONE ENCOUNTER
Refill readiness for Humira confirmed with patient; name/ confirmed; no missed doses; no new medications; no side effects noted; address confirmed for  shipment and  delivery.

## 2018-01-31 DIAGNOSIS — K50.80 CROHN'S DISEASE OF BOTH SMALL AND LARGE INTESTINE WITHOUT COMPLICATION: ICD-10-CM

## 2018-01-31 RX ORDER — MESALAMINE 800 MG/1
800 TABLET, DELAYED RELEASE ORAL 3 TIMES DAILY
Qty: 180 TABLET | Refills: 0 | Status: SHIPPED | OUTPATIENT
Start: 2018-01-31 | End: 2018-10-06 | Stop reason: SDUPTHER

## 2018-02-01 ENCOUNTER — TELEPHONE (OUTPATIENT)
Dept: GASTROENTEROLOGY | Facility: CLINIC | Age: 78
End: 2018-02-01

## 2018-02-01 NOTE — TELEPHONE ENCOUNTER
----- Message from Julio César Lackey MD sent at 1/31/2018  6:45 PM CST -----  Contact: [phone] (M)  I printed out, can fax to this new pharmacy or enter into [facility]  ----- Message -----  From: Cee Nichols MA  Sent: 1/31/2018   1:56 PM  To: Julio César Lackey MD        ----- Message -----  From: Jagruti Hobbs MA  Sent: 1/31/2018   1:47 PM  To: Ziyad Lackey    Pt is leaving at 3 a.m. To go check on his property in the [facility]. He is needing his Rx for ASACOL  mg TbEC called in to a [facility] for when he gets there. You can use [facility] [phone] and asked to please make it a 90 day Rx with 3 refill. Pt can be reached on his cell phone the rest of the day at [phone]

## 2018-02-01 NOTE — TELEPHONE ENCOUNTER
Spoke with .  Stated they were in route to [place].  She will call me Monday or Tuesday to let me know if the is another contact number for [facility].  [place]  has enough medication for seven days.

## 2018-02-01 NOTE — TELEPHONE ENCOUNTER
Tried calling the [facility] he requested and it is not a working number at this time.  Returned his call asking for an alternative number.

## 2018-02-01 NOTE — TELEPHONE ENCOUNTER
----- Message from Julio César Lackey MD sent at 1/31/2018  6:45 PM CST -----  Contact: [phone] (M)  I printed out, can fax to this new pharmacy or enter into [facility]  ----- Message -----  From: Cee Nichols MA  Sent: 1/31/2018   1:56 PM  To: Julio César Lackey MD        ----- Message -----  From: Jagruti Hobbs MA  Sent: 1/31/2018   1:47 PM  To: Ziyad Lackey    Pt is leaving at 3 a.m. To go check on his property in the [facility]. He is needing his Rx for ASACOL  mg TbEC called in to a [facility] for when he gets there. You can use [facility] [phone] and asked to please make it a 90 day Rx with 3 refill. Pt can be reached on his cell phone the rest of the day at [phone]

## 2018-02-05 ENCOUNTER — TELEPHONE (OUTPATIENT)
Dept: GASTROENTEROLOGY | Facility: CLINIC | Age: 78
End: 2018-02-05

## 2018-02-05 NOTE — TELEPHONE ENCOUNTER
----- Message from Shandra Dent sent at 2/5/2018 10:05 AM CST -----  Contact: pt's wife Anne [phone]   [place]                                 Prescription Refill Request  Name of medication and dose   ASACOL  mg [facility]    Pharmacy   [facility]  [phone]    Are you completely out of your medication No    Additional Information:  Anne states they are out of town and would like a 2 months supply of pt's medication sent to a rx near them to last until they return. Anne states she does not have phone reception where they live so you will be unable to reach them. Anne states she will be in the area where she is currently calling from for the next hour if you need to speak with her.

## 2018-02-06 DIAGNOSIS — K50.80 CROHN'S DISEASE OF BOTH SMALL AND LARGE INTESTINE WITHOUT COMPLICATION: ICD-10-CM

## 2018-02-06 RX ORDER — MESALAMINE 800 MG/1
800 TABLET, DELAYED RELEASE ORAL 3 TIMES DAILY
Qty: 180 TABLET | Refills: 0 | Status: CANCELLED | OUTPATIENT
Start: 2018-02-06

## 2018-02-08 ENCOUNTER — TELEPHONE (OUTPATIENT)
Dept: GASTROENTEROLOGY | Facility: CLINIC | Age: 78
End: 2018-02-08

## 2018-02-08 NOTE — TELEPHONE ENCOUNTER
Spoke with pharmacist, Madiha, with [facility] in [facility].  Per Dr.James Lackey verbal Rx for Asacol HD, 800mg, #180, with no refills, one tablet by mouth 3 times daily given to Madiha and read back correctly.

## 2018-03-22 ENCOUNTER — TELEPHONE (OUTPATIENT)
Dept: INTERNAL MEDICINE | Facility: CLINIC | Age: 78
End: 2018-03-22

## 2018-03-22 NOTE — TELEPHONE ENCOUNTER
----- Message from Ngoc Marie sent at 3/22/2018 11:17 AM CDT -----  Contact: wife  _  1st Request  _  2nd Request  _  3rd Request      Who:Anne     Why: returning call back     What Number to Call Back: [phone]    When to Expect a call back: (Before the end of the day)   -- if call after 3:00 call back will be tomorrow.

## 2018-03-23 ENCOUNTER — TELEPHONE (OUTPATIENT)
Dept: PHARMACY | Facility: CLINIC | Age: 78
End: 2018-03-23

## 2018-03-26 ENCOUNTER — TELEPHONE (OUTPATIENT)
Dept: GASTROENTEROLOGY | Facility: CLINIC | Age: 78
End: 2018-03-26

## 2018-03-26 DIAGNOSIS — K50.819 CROHN'S DISEASE OF SMALL AND LARGE INTESTINES WITH COMPLICATION: Primary | ICD-10-CM

## 2018-03-26 NOTE — TELEPHONE ENCOUNTER
[facility] and [facility] will try their best to get the Rx out tomorrow.  They do have to do a benefit investigation and possible PA because it is a new Rx.  Fanny Nevin expressed understanding.

## 2018-03-26 NOTE — TELEPHONE ENCOUNTER
----- Message from Estelle Hadley sent at 3/26/2018  2:51 PM CDT -----  Contact: Wife- Anne- [phone]  Ziyad- pts wife called to speak with Frida- needs a refill of the rx Humira- would like to discuss getting a year long supply with [facility]- please call Anne back at [phone]

## 2018-03-26 NOTE — TELEPHONE ENCOUNTER
----- Message from Estelle Hadley sent at 3/26/2018  2:51 PM CDT -----  Contact: Wife- Anne- [phone]  Ziyad- pts wife called to speak with Frida- needs a refill of the rx Humira- would like to discuss getting a year long supply with [facility]- please call Anne back at [phone]

## 2018-03-27 ENCOUNTER — TELEPHONE (OUTPATIENT)
Dept: PHARMACY | Facility: CLINIC | Age: 78
End: 2018-03-27

## 2018-03-28 ENCOUNTER — TELEPHONE (OUTPATIENT)
Dept: PAIN MEDICINE | Facility: CLINIC | Age: 78
End: 2018-03-28

## 2018-03-28 NOTE — TELEPHONE ENCOUNTER
Staff contacted the patient to inform him that his 3/29/18 appointment would have to be rescheduled due to Dr. Gerardo MD being in  jury duty.     Anne Dunia stated she can schedule on behalf of the patient. Anne Duque has accepted the appointment with Deysi Prater NP tomorrow 3/29/18for 9:40 am.    Staff scheduled the patient for the appointment.    Anne Duque verbalized understanding and expressed thanks for getting the patient seen.

## 2018-03-29 ENCOUNTER — OFFICE VISIT (OUTPATIENT)
Dept: PAIN MEDICINE | Facility: CLINIC | Age: 78
End: 2018-03-29
Attending: ANESTHESIOLOGY
Payer: COMMERCIAL

## 2018-03-29 VITALS
BODY MASS INDEX: 23.71 KG/M2 | WEIGHT: 138.88 LBS | SYSTOLIC BLOOD PRESSURE: 173 MMHG | HEIGHT: 64 IN | DIASTOLIC BLOOD PRESSURE: 70 MMHG | TEMPERATURE: 99 F | RESPIRATION RATE: 18 BRPM | HEART RATE: 73 BPM

## 2018-03-29 DIAGNOSIS — Z79.899 ENCOUNTER FOR LONG-TERM (CURRENT) USE OF HIGH-RISK MEDICATION: ICD-10-CM

## 2018-03-29 DIAGNOSIS — M79.10 MYALGIA: ICD-10-CM

## 2018-03-29 DIAGNOSIS — R53.81 PHYSICAL DECONDITIONING: ICD-10-CM

## 2018-03-29 DIAGNOSIS — F11.20 UNCOMPLICATED OPIOID DEPENDENCE: ICD-10-CM

## 2018-03-29 DIAGNOSIS — K50.80 CROHN'S DISEASE OF BOTH SMALL AND LARGE INTESTINE WITHOUT COMPLICATION: Primary | ICD-10-CM

## 2018-03-29 DIAGNOSIS — K52.9 CHRONIC DIARRHEA: ICD-10-CM

## 2018-03-29 DIAGNOSIS — M47.899 FACET SYNDROME: ICD-10-CM

## 2018-03-29 DIAGNOSIS — K50.80 CROHN'S DISEASE OF BOTH SMALL AND LARGE INTESTINE WITHOUT COMPLICATION: ICD-10-CM

## 2018-03-29 PROCEDURE — 3078F DIAST BP <80 MM HG: CPT | Mod: CPTII,S$GLB,, | Performed by: NURSE PRACTITIONER

## 2018-03-29 PROCEDURE — 99999 PR PBB SHADOW E&M-EST. PATIENT-LVL III: CPT | Mod: PBBFAC,,, | Performed by: NURSE PRACTITIONER

## 2018-03-29 PROCEDURE — 3077F SYST BP >= 140 MM HG: CPT | Mod: CPTII,S$GLB,, | Performed by: NURSE PRACTITIONER

## 2018-03-29 PROCEDURE — 99213 OFFICE O/P EST LOW 20 MIN: CPT | Mod: S$GLB,,, | Performed by: NURSE PRACTITIONER

## 2018-03-29 RX ORDER — OXYCODONE AND ACETAMINOPHEN 10; 325 MG/1; MG/1
1 TABLET ORAL EVERY 8 HOURS PRN
Qty: 90 TABLET | Refills: 0 | Status: CANCELLED | OUTPATIENT
Start: 2018-03-29 | End: 2018-04-28

## 2018-03-29 NOTE — TELEPHONE ENCOUNTER
"Staff contacted the patient wife back to inform her that Deysi Prater NP is out of clinic and will return on Monday and also that the patient prescription is pending the approval of Dr. Gerardo MD prior to being sent to the pharmacy.     Patient wife verbalized understanding and expressed and wished staff a Happy Easter."  "

## 2018-03-29 NOTE — TELEPHONE ENCOUNTER
----- Message from Mohit Reed sent at 3/29/2018  3:44 PM CDT -----  Contact: Anne (wife)  X_ 1st Request  _ 2nd Request  _ 3rd Request    Who: Anne (wife)    Why: Patient wife wanted to confirm that the prescription they are waiting on will be called into [id number] - [address]    What Number to Call Back: [phone]    When to Expect a call back: (Before the end of the day)  -- if call after 3:00 call back will be tomorrow.
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
## 2018-04-02 ENCOUNTER — TELEPHONE (OUTPATIENT)
Dept: PAIN MEDICINE | Facility: CLINIC | Age: 78
End: 2018-04-02

## 2018-04-02 DIAGNOSIS — M79.10 MYALGIA: ICD-10-CM

## 2018-04-02 DIAGNOSIS — R53.81 PHYSICAL DECONDITIONING: ICD-10-CM

## 2018-04-02 DIAGNOSIS — F11.20 UNCOMPLICATED OPIOID DEPENDENCE: ICD-10-CM

## 2018-04-02 DIAGNOSIS — K50.80 CROHN'S DISEASE OF BOTH SMALL AND LARGE INTESTINE WITHOUT COMPLICATION: ICD-10-CM

## 2018-04-02 DIAGNOSIS — Z79.899 ENCOUNTER FOR LONG-TERM (CURRENT) USE OF HIGH-RISK MEDICATION: ICD-10-CM

## 2018-04-02 DIAGNOSIS — K52.9 CHRONIC DIARRHEA: ICD-10-CM

## 2018-04-02 DIAGNOSIS — M47.899 FACET SYNDROME: ICD-10-CM

## 2018-04-02 RX ORDER — OXYCODONE AND ACETAMINOPHEN 10; 325 MG/1; MG/1
1 TABLET ORAL EVERY 8 HOURS PRN
Qty: 90 TABLET | Refills: 0 | Status: SHIPPED | OUTPATIENT
Start: 2018-04-02 | End: 2018-05-02

## 2018-04-02 NOTE — TELEPHONE ENCOUNTER
Staff contacted and spoke with patient wife Anne Rooney informing her that her  medication where sent to his pharmacy at [facility] [place].  Patient wife Anne Rooney verbalized understanding.

## 2018-04-02 NOTE — TELEPHONE ENCOUNTER
----- Message from Ivana Avilez sent at 4/2/2018 10:06 AM CDT -----  Contact: pt's wife torrie [phone]      Can the clinic reply in [facility]:       Please refill the medication(s) listed below. Please call the patient when the prescription(s) is ready for  at this phone number     pt's wife torrie [phone]    Also had questions regarding summary for last visit. The bp is wrong. It is supposed to be 137 not 173. Please correct it.        Medication #1oxyCODONE-acetaminophen (PERCOCET)  mg per tablet     Medication #2      Preferred Pharmacy:[facility] on [address] [phone]

## 2018-04-02 NOTE — TELEPHONE ENCOUNTER
----- Message from Jane Oh sent at 4/2/2018  2:17 PM CDT -----        Name of Who is Calling: Anne(Pt's wife)      What is the request in detail: Pt's wife is calling in regards to his oxyCODONE-acetaminophen (PERCOCET)  mg per tablet 270 tablet . She states the pharmacy states they have not received it. Please call to discuss.        Can the clinic reply by [facility]:no      What Number to [facility] Back [facility][phone]

## 2018-04-03 ENCOUNTER — TELEPHONE (OUTPATIENT)
Dept: PAIN MEDICINE | Facility: CLINIC | Age: 78
End: 2018-04-03

## 2018-04-03 NOTE — TELEPHONE ENCOUNTER
----- Message from Ivana Avilez sent at 4/2/2018 10:06 AM CDT -----  Contact: pt's wife torrie [phone]      Can the clinic reply in [facility]:       Please refill the medication(s) listed below. Please call the patient when the prescription(s) is ready for  at this phone number     pt's wife torrie [phone]    Also had questions regarding summary for last visit. The bp is wrong. It is supposed to be 137 not 173. Please correct it.        Medication #1oxyCODONE-acetaminophen (PERCOCET)  mg per tablet     Medication #2      Preferred Pharmacy:[facility] [address] [phone]

## 2018-04-09 ENCOUNTER — TELEPHONE (OUTPATIENT)
Dept: PHARMACY | Facility: CLINIC | Age: 78
End: 2018-04-09

## 2018-04-17 ENCOUNTER — TELEPHONE (OUTPATIENT)
Dept: PHARMACY | Facility: CLINIC | Age: 78
End: 2018-04-17

## 2018-05-09 ENCOUNTER — TELEPHONE (OUTPATIENT)
Dept: PHARMACY | Facility: CLINIC | Age: 78
End: 2018-05-09

## 2018-06-19 ENCOUNTER — TELEPHONE (OUTPATIENT)
Dept: PAIN MEDICINE | Facility: CLINIC | Age: 78
End: 2018-06-19

## 2018-06-19 ENCOUNTER — TELEPHONE (OUTPATIENT)
Dept: GASTROENTEROLOGY | Facility: CLINIC | Age: 78
End: 2018-06-19

## 2018-06-19 NOTE — TELEPHONE ENCOUNTER
Spoke with Madiha, pharmacist at [facility], in [facility].  Refill Rx given for Asacol HD, 800 mg, #180, 3 by mouth once a day with 2 refills.  Rx read back correctly.

## 2018-06-19 NOTE — TELEPHONE ENCOUNTER
----- Message from Carolee Escoto sent at 6/19/2018  9:05 AM CDT -----  Contact: pt's wife,Anne            Name of Who is Calling: Anne      What is the request in detail: requesting a call back in regards to some questions about pt having to see a different Pain Management doctor while in [facility] or he will run out of meds. Pt will be back in [facility], Please call and maurie      Can the clinic reply by [facility]:no      What Number to Call Back [facility] not [facility][phone], pt's wife states that service is very poor so hopefully you can get through

## 2018-06-19 NOTE — TELEPHONE ENCOUNTER
Hello, this is staff I've received your request I'm returning your call from the  clinic at [facility]. I just wanted to let you know that I'm working on getting an answer for you by . ( Please add all other notes here if needed.)    Returned patient call 3 times unsuccessfully, no voicemail available

## 2018-06-21 ENCOUNTER — TELEPHONE (OUTPATIENT)
Dept: PAIN MEDICINE | Facility: CLINIC | Age: 78
End: 2018-06-21

## 2018-06-21 NOTE — TELEPHONE ENCOUNTER
----- Message from Cliff Carrillo MD sent at 6/20/2018  6:01 PM CDT -----  Contact: pt's wife,Anne  We can refill the medication how are they going to receive it.    ----- Message -----  From: Susi Guo LPN  Sent: 6/19/2018   3:18 PM  To: Cliff Carrillo MD    Hello again,  This patient is in [place] and not scheduled to return until 11/2018 and his wife Anne wants to know if you will refill his Norco, last filled 04/02/2018,Lov 03/29/2018  ----- Message -----  From: Carolee Escoto  Sent: 6/19/2018   9:05 AM  To: Gerardo Coronado Staff              Name of Who is Calling: Anne      What is the request in detail: requesting a call back in regards to some questions about pt having to see a different Pain Management doctor while in [facility] or he will run out of meds. Pt will be back in [facility], Please call and gopi      Can the clinic reply by [facility]:no      What Number to Call Back if not in [facility][phone], pt's wife states that service is very poor so hopefully you can get through

## 2018-06-21 NOTE — TELEPHONE ENCOUNTER
Hello, this is staff I've received your request I'm returning your call from the  clinic at [facility]. I just wanted to let you know that I'm working on getting an answer for you by . ( Please add all other notes here if needed.)    Returned patient call to advise that Dr Carrillo would refill the patient's medication, no answer, left voicemail message for patient to return the call

## 2018-06-22 ENCOUNTER — TELEPHONE (OUTPATIENT)
Dept: PAIN MEDICINE | Facility: CLINIC | Age: 78
End: 2018-06-22

## 2018-06-22 NOTE — TELEPHONE ENCOUNTER
Staff received a call from patient wife, they are in the [place] and Mr. Rooney requires a refill on his Oxycodone. The problem is the drug store does not accept [employer]. She was requesting the office mail the prescription to her son, where he will fed ex to them.     Mrs. Rooney was informed that we are unable to mail a prescription, she was provided the option of having the office fed ex to them at their financial responsibility if they have a account.     She informed staff they do not, she is in a conundrum as patient requires his medication, the next best thing is he can see a Dr. Shawn Guardado at [facility] who may prescribe his medication if they can get his pain management records, but the only way they can receive them is through email.      Staff contacted their office and they can receive Facsimile at [phone].

## 2018-07-31 RX ORDER — CIPROFLOXACIN 500 MG/1
TABLET ORAL
Qty: 90 TABLET | Refills: 1 | Status: SHIPPED | OUTPATIENT
Start: 2018-07-31 | End: 2019-03-25

## 2018-10-06 ENCOUNTER — TELEPHONE (OUTPATIENT)
Dept: GASTROENTEROLOGY | Facility: CLINIC | Age: 78
End: 2018-10-06

## 2018-10-06 DIAGNOSIS — K50.80 CROHN'S DISEASE OF BOTH SMALL AND LARGE INTESTINE WITHOUT COMPLICATION: ICD-10-CM

## 2018-10-06 DIAGNOSIS — K50.819 CROHN'S DISEASE OF SMALL AND LARGE INTESTINES WITH COMPLICATION: ICD-10-CM

## 2018-10-06 RX ORDER — MESALAMINE 800 MG/1
800 TABLET, DELAYED RELEASE ORAL 3 TIMES DAILY
Qty: 180 TABLET | Refills: 6 | OUTPATIENT
Start: 2018-10-06 | End: 2018-10-09 | Stop reason: SDUPTHER

## 2018-10-06 NOTE — TELEPHONE ENCOUNTER
----- Message from Raven Ramey MA sent at 10/5/2018  1:00 PM CDT -----  Contact: Mrs. Rooney- [phone]      ----- Message -----  From: Estelle Hadley  Sent: 10/5/2018  12:23 PM  To: Ziyad WATKINS Staff    Ziyad- pts wife called to get a new rx fro Asacol and Humira- pt uses [facility] fro the Humira and Asacol goes to [facility]- please contact Mrs. Rooney at [phone]

## 2018-10-08 ENCOUNTER — TELEPHONE (OUTPATIENT)
Dept: PHARMACY | Facility: CLINIC | Age: 78
End: 2018-10-08

## 2018-10-09 DIAGNOSIS — K50.80 CROHN'S DISEASE OF BOTH SMALL AND LARGE INTESTINE WITHOUT COMPLICATION: ICD-10-CM

## 2018-10-09 NOTE — TELEPHONE ENCOUNTER
Humira initial. $55 (004). Shipping out 10/10/18. Address confirmed. Injects every Sunday. Requested new sharps container.   Experienced to medication - declined consult.   Familiar with [facility] services.

## 2018-10-10 RX ORDER — MESALAMINE 800 MG/1
TABLET, DELAYED RELEASE ORAL
Qty: 180 TABLET | Refills: 0 | Status: SHIPPED | OUTPATIENT
Start: 2018-10-10 | End: 2018-11-24 | Stop reason: SDUPTHER

## 2018-10-15 ENCOUNTER — OFFICE VISIT (OUTPATIENT)
Dept: PAIN MEDICINE | Facility: CLINIC | Age: 78
End: 2018-10-15
Attending: ANESTHESIOLOGY
Payer: COMMERCIAL

## 2018-10-15 VITALS
DIASTOLIC BLOOD PRESSURE: 80 MMHG | SYSTOLIC BLOOD PRESSURE: 145 MMHG | HEIGHT: 64 IN | HEART RATE: 75 BPM | RESPIRATION RATE: 18 BRPM | BODY MASS INDEX: 25.21 KG/M2 | WEIGHT: 147.69 LBS | TEMPERATURE: 98 F

## 2018-10-15 DIAGNOSIS — M79.10 MYALGIA: ICD-10-CM

## 2018-10-15 DIAGNOSIS — M47.899 FACET SYNDROME: ICD-10-CM

## 2018-10-15 DIAGNOSIS — J98.6 DIAPHRAGMATIC DISORDER: ICD-10-CM

## 2018-10-15 DIAGNOSIS — T38.0X5A STEROID-INDUCED OSTEOPOROSIS: ICD-10-CM

## 2018-10-15 DIAGNOSIS — M81.8 STEROID-INDUCED OSTEOPOROSIS: ICD-10-CM

## 2018-10-15 DIAGNOSIS — K50.80 CROHN'S DISEASE OF BOTH SMALL AND LARGE INTESTINE WITHOUT COMPLICATION: ICD-10-CM

## 2018-10-15 DIAGNOSIS — F11.20 UNCOMPLICATED OPIOID DEPENDENCE: Primary | ICD-10-CM

## 2018-10-15 PROCEDURE — 1101F PT FALLS ASSESS-DOCD LE1/YR: CPT | Mod: CPTII,S$GLB,, | Performed by: ANESTHESIOLOGY

## 2018-10-15 PROCEDURE — 3079F DIAST BP 80-89 MM HG: CPT | Mod: CPTII,S$GLB,, | Performed by: ANESTHESIOLOGY

## 2018-10-15 PROCEDURE — 99999 PR PBB SHADOW E&M-EST. PATIENT-LVL IV: CPT | Mod: PBBFAC,,, | Performed by: ANESTHESIOLOGY

## 2018-10-15 PROCEDURE — 3077F SYST BP >= 140 MM HG: CPT | Mod: CPTII,S$GLB,, | Performed by: ANESTHESIOLOGY

## 2018-10-15 PROCEDURE — 99214 OFFICE O/P EST MOD 30 MIN: CPT | Mod: S$GLB,,, | Performed by: ANESTHESIOLOGY

## 2018-10-15 RX ORDER — OXYCODONE AND ACETAMINOPHEN 10; 325 MG/1; MG/1
1 TABLET ORAL 3 TIMES DAILY PRN
Qty: 90 TABLET | Refills: 0 | Status: SHIPPED | OUTPATIENT
Start: 2018-12-15 | End: 2019-01-13

## 2018-10-15 RX ORDER — OXYCODONE AND ACETAMINOPHEN 10; 325 MG/1; MG/1
1 TABLET ORAL 3 TIMES DAILY PRN
Qty: 90 TABLET | Refills: 0 | Status: SHIPPED | OUTPATIENT
Start: 2018-11-15 | End: 2018-12-14

## 2018-10-15 RX ORDER — OXYCODONE AND ACETAMINOPHEN 10; 325 MG/1; MG/1
1 TABLET ORAL 3 TIMES DAILY PRN
Qty: 90 TABLET | Refills: 0 | Status: SHIPPED | OUTPATIENT
Start: 2018-10-15 | End: 2018-11-14

## 2018-10-15 RX ORDER — OXYCODONE AND ACETAMINOPHEN 10; 325 MG/1; MG/1
TABLET ORAL
COMMUNITY
Start: 2018-09-12 | End: 2019-01-16
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
## 2018-10-16 ENCOUNTER — TELEPHONE (OUTPATIENT)
Dept: GASTROENTEROLOGY | Facility: CLINIC | Age: 78
End: 2018-10-16

## 2018-10-16 NOTE — TELEPHONE ENCOUNTER
----- Message from Tara Katz sent at 10/15/2018  1:00 PM CDT -----  Contact: wife - torrie chris - [phone]  Ziyad - [facility] appt for checkup for crohn's - please call wife - torrie Landon [phone]

## 2018-10-16 NOTE — TELEPHONE ENCOUNTER
----- Message from Estelle Hadley sent at 10/16/2018  3:27 PM CDT -----  Contact: Wife- Anne- [phone]  Ziyad- pts wife is returning a call to Frida in regards to scheduling the pt an appt with Dr. Lackey- please contact Gus at [phone]

## 2018-10-16 NOTE — TELEPHONE ENCOUNTER
Spoke with Mrs. Rooney.  Aware an appointment has been set up for  on 12/3 for 8:30 with Dr.James Lackey.

## 2018-11-05 ENCOUNTER — TELEPHONE (OUTPATIENT)
Dept: PHARMACY | Facility: CLINIC | Age: 78
End: 2018-11-05

## 2018-11-05 NOTE — TELEPHONE ENCOUNTER
Patient's wife Anne reached out to [facility] regard specialty medication refill for Humira 4pens/28 $55/004- She scheduled to have medication ship out on Mon 11/5 to arrive on Tues 11/6 address confirmed & [id number] on file. She informed no new medications, conditions or allergies since last talked to [facility]. She inform no missed dose & have no injection remaining/due for the next injection on Sun. She declined questions for the clinical pharmacist. Voiced understanding.

## 2018-11-24 DIAGNOSIS — K50.80 CROHN'S DISEASE OF BOTH SMALL AND LARGE INTESTINE WITHOUT COMPLICATION: ICD-10-CM

## 2018-11-25 RX ORDER — MESALAMINE 800 MG/1
TABLET, DELAYED RELEASE ORAL
Qty: 180 TABLET | Refills: 0 | Status: SHIPPED | OUTPATIENT
Start: 2018-11-25 | End: 2019-01-07 | Stop reason: SDUPTHER

## 2018-12-03 ENCOUNTER — OFFICE VISIT (OUTPATIENT)
Dept: GASTROENTEROLOGY | Facility: CLINIC | Age: 78
End: 2018-12-03
Payer: COMMERCIAL

## 2018-12-03 ENCOUNTER — LAB VISIT (OUTPATIENT)
Dept: LAB | Facility: HOSPITAL | Age: 78
End: 2018-12-03
Attending: INTERNAL MEDICINE
Payer: MEDICARE

## 2018-12-03 ENCOUNTER — OFFICE VISIT (OUTPATIENT)
Dept: INFECTIOUS DISEASES | Facility: CLINIC | Age: 78
End: 2018-12-03
Payer: MEDICARE

## 2018-12-03 VITALS
DIASTOLIC BLOOD PRESSURE: 82 MMHG | SYSTOLIC BLOOD PRESSURE: 133 MMHG | HEIGHT: 64 IN | BODY MASS INDEX: 25.56 KG/M2 | WEIGHT: 149.69 LBS | HEART RATE: 65 BPM

## 2018-12-03 VITALS
SYSTOLIC BLOOD PRESSURE: 160 MMHG | HEIGHT: 64 IN | DIASTOLIC BLOOD PRESSURE: 89 MMHG | BODY MASS INDEX: 25.56 KG/M2 | WEIGHT: 149.69 LBS | HEART RATE: 72 BPM | TEMPERATURE: 98 F

## 2018-12-03 DIAGNOSIS — K50.80 CROHN'S DISEASE OF BOTH SMALL AND LARGE INTESTINE WITHOUT COMPLICATION: Primary | ICD-10-CM

## 2018-12-03 DIAGNOSIS — Z71.84 TRAVEL ADVICE ENCOUNTER: Primary | ICD-10-CM

## 2018-12-03 DIAGNOSIS — Z23 IMMUNIZATION DUE: ICD-10-CM

## 2018-12-03 DIAGNOSIS — K50.80 CROHN'S DISEASE OF BOTH SMALL AND LARGE INTESTINE WITHOUT COMPLICATION: ICD-10-CM

## 2018-12-03 DIAGNOSIS — K50.819 CROHN'S DISEASE OF BOTH SMALL AND LARGE INTESTINE WITH COMPLICATION: ICD-10-CM

## 2018-12-03 DIAGNOSIS — Z79.899 ENCOUNTER FOR LONG-TERM (CURRENT) USE OF HIGH-RISK MEDICATION: ICD-10-CM

## 2018-12-03 LAB
ALBUMIN SERPL BCP-MCNC: 3.8 G/DL
ALP SERPL-CCNC: 52 U/L
ALT SERPL W/O P-5'-P-CCNC: 14 U/L
ANION GAP SERPL CALC-SCNC: 6 MMOL/L
AST SERPL-CCNC: 24 U/L
BASOPHILS # BLD AUTO: 0.03 K/UL
BASOPHILS NFR BLD: 0.4 %
BILIRUB SERPL-MCNC: 0.7 MG/DL
BUN SERPL-MCNC: 18 MG/DL
CALCIUM SERPL-MCNC: 9.2 MG/DL
CHLORIDE SERPL-SCNC: 103 MMOL/L
CO2 SERPL-SCNC: 31 MMOL/L
CREAT SERPL-MCNC: 0.8 MG/DL
CRP SERPL-MCNC: 1.9 MG/L
DIFFERENTIAL METHOD: ABNORMAL
EOSINOPHIL # BLD AUTO: 0.3 K/UL
EOSINOPHIL NFR BLD: 3.3 %
ERYTHROCYTE [DISTWIDTH] IN BLOOD BY AUTOMATED COUNT: 15.9 %
EST. GFR  (AFRICAN AMERICAN): >60 ML/MIN/1.73 M^2
EST. GFR  (NON AFRICAN AMERICAN): >60 ML/MIN/1.73 M^2
GLUCOSE SERPL-MCNC: 88 MG/DL
HCT VFR BLD AUTO: 41.7 %
HGB BLD-MCNC: 13.6 G/DL
IMM GRANULOCYTES # BLD AUTO: 0.02 K/UL
IMM GRANULOCYTES NFR BLD AUTO: 0.3 %
LYMPHOCYTES # BLD AUTO: 2.2 K/UL
LYMPHOCYTES NFR BLD: 27.6 %
MCH RBC QN AUTO: 32.9 PG
MCHC RBC AUTO-ENTMCNC: 32.6 G/DL
MCV RBC AUTO: 101 FL
MONOCYTES # BLD AUTO: 0.8 K/UL
MONOCYTES NFR BLD: 10.6 %
NEUTROPHILS # BLD AUTO: 4.5 K/UL
NEUTROPHILS NFR BLD: 57.8 %
NRBC BLD-RTO: 0 /100 WBC
PLATELET # BLD AUTO: 210 K/UL
PMV BLD AUTO: 11.4 FL
POTASSIUM SERPL-SCNC: 4.1 MMOL/L
PROT SERPL-MCNC: 7.4 G/DL
RBC # BLD AUTO: 4.14 M/UL
SODIUM SERPL-SCNC: 140 MMOL/L
WBC # BLD AUTO: 7.82 K/UL

## 2018-12-03 PROCEDURE — 99999 PR PBB SHADOW E&M-EST. PATIENT-LVL III: CPT | Mod: PBBFAC,,, | Performed by: PHYSICIAN ASSISTANT

## 2018-12-03 PROCEDURE — 99402 PREV MED CNSL INDIV APPRX 30: CPT | Mod: S$GLB,,, | Performed by: PHYSICIAN ASSISTANT

## 2018-12-03 PROCEDURE — 36415 COLL VENOUS BLD VENIPUNCTURE: CPT

## 2018-12-03 PROCEDURE — 3075F SYST BP GE 130 - 139MM HG: CPT | Mod: CPTII,S$GLB,, | Performed by: INTERNAL MEDICINE

## 2018-12-03 PROCEDURE — 86140 C-REACTIVE PROTEIN: CPT

## 2018-12-03 PROCEDURE — 3079F DIAST BP 80-89 MM HG: CPT | Mod: CPTII,S$GLB,, | Performed by: INTERNAL MEDICINE

## 2018-12-03 PROCEDURE — 80053 COMPREHEN METABOLIC PANEL: CPT

## 2018-12-03 PROCEDURE — 99215 OFFICE O/P EST HI 40 MIN: CPT | Mod: S$GLB,,, | Performed by: INTERNAL MEDICINE

## 2018-12-03 PROCEDURE — 85025 COMPLETE CBC W/AUTO DIFF WBC: CPT

## 2018-12-03 PROCEDURE — 1101F PT FALLS ASSESS-DOCD LE1/YR: CPT | Mod: CPTII,S$GLB,, | Performed by: INTERNAL MEDICINE

## 2018-12-03 PROCEDURE — 99999 PR PBB SHADOW E&M-EST. PATIENT-LVL III: CPT | Mod: PBBFAC,,, | Performed by: INTERNAL MEDICINE

## 2018-12-03 RX ORDER — AZATHIOPRINE 50 MG/1
TABLET ORAL
Qty: 450 TABLET | Refills: 3 | Status: SHIPPED | OUTPATIENT
Start: 2018-12-03 | End: 2019-08-27

## 2018-12-03 RX ORDER — HYOSCYAMINE SULFATE 0.38 MG/1
0.38 TABLET, EXTENDED RELEASE ORAL EVERY 12 HOURS
Qty: 180 TABLET | Refills: 3 | Status: SHIPPED | OUTPATIENT
Start: 2018-12-03 | End: 2019-12-09 | Stop reason: SDUPTHER

## 2018-12-03 RX ORDER — CIPROFLOXACIN 500 MG/1
TABLET ORAL
Qty: 90 TABLET | Refills: 3 | Status: SHIPPED | OUTPATIENT
Start: 2018-12-03 | End: 2019-03-25

## 2018-12-03 NOTE — PROGRESS NOTES
Subjective:       Patient ID: Garry Rooney is a 78 y.o. male.    Chief Complaint: Crohn's Disease and Follow-up    HPI  Review of Systems   Constitutional: Positive for fatigue. Negative for activity change, appetite change, chills, diaphoresis, fever and unexpected weight change.   HENT: Negative for congestion, ear pain, mouth sores, rhinorrhea, sinus pressure, sneezing, sore throat, trouble swallowing and voice change.    Eyes: Negative for pain.   Respiratory: Positive for shortness of breath. Negative for cough.    Cardiovascular: Negative for chest pain, palpitations and leg swelling.   Genitourinary: Negative for difficulty urinating and flank pain.   Musculoskeletal: Positive for arthralgias, back pain and gait problem. Negative for joint swelling, myalgias and neck pain.   Skin: Negative for rash.   Neurological: Negative for dizziness, tremors, syncope, numbness and headaches.   Hematological: Negative for adenopathy. Does not bruise/bleed easily.   Psychiatric/Behavioral: Negative for agitation, behavioral problems, confusion, decreased concentration and dysphoric mood.       Objective:      Physical Exam   Constitutional: He is oriented to person, place, and time. He appears well-developed and well-nourished. No distress.   HENT:   Right Ear: External ear normal.   Left Ear: External ear normal.   Nose: Nose normal.   Mouth/Throat: Oropharynx is clear and moist. No oropharyngeal exudate.   Eyes: Conjunctivae are normal. Pupils are equal, round, and reactive to light. No scleral icterus.   Neck: Normal range of motion. Neck supple. No thyromegaly present.   Cardiovascular: Normal rate, normal heart sounds and intact distal pulses. Exam reveals no gallop.   No murmur heard.  Pulmonary/Chest: Effort normal. He has no wheezes.   decr breath sounds   Abdominal: Soft. Normal appearance and bowel sounds are normal. He exhibits no distension, no fluid wave, no ascites and no mass. There is no  hepatosplenomegaly. There is no tenderness. There is no rigidity, no rebound, no guarding and no CVA tenderness.   Genitourinary:   Genitourinary Comments: recent   Musculoskeletal: Normal range of motion. He exhibits no edema or tenderness.   Lymphadenopathy:     He has no cervical adenopathy.   Neurological: He is alert and oriented to person, place, and time. He has normal reflexes. No cranial nerve deficit.   Skin: Skin is warm. No rash noted. He is not diaphoretic.   Psychiatric: He has a normal mood and affect. His behavior is normal. Thought content normal.       Assessment:       1. Crohn's disease of both small and large intestine without complication        Plan:         HISTORY OF PRESENT ILLNESS:  This is a followup for Mr. Rooney.  He is here with   his wife.  He is an 78-year-old gentleman with a significant Crohn disease.  I   have shared his care with Dr. Ariel Mock at [place].  His treatment has been   complicated for a variety of reasons.  One is because of the underlying severe   Crohn disease, which has required a partial colon resection.  There also are his   other situations, one being oxygen dependent and two; living most of the year   in the [place].  His current regimen is Humira once a week,   combined with high-dose azathioprine, Cipro once a day and Asacol.  A year ago,   I checked adalimumab level, it was low at 4.9 and that was the impetus for   changing Humira to once a week.  The change in Humira to once a week has   definitely helped.  He is having less symptoms now.  His stools are variable.    For several days a week, they could be fairly well formed and then they become   looser and more frequent with more urgency.  There is no rectal bleeding, no   severe abdominal pain.  No obstructive symptoms.  No nausea or vomiting.  He has   chronic irritation around the anus from his frequent bowel movements.    REVIEW OF SYSTEMS:  Otherwise, negative for anything new.  He still  has the   severe difficulty with chronic back pain and on chronic opioids for that.    SOCIAL HISTORY:  He has a lot of frustration this year.  [place]   [place]; some [place] difficulties.    His 17-year-old cat is very ill and a lot of issues there.    ASSESSMENT AND DECISION MAKIN.  Crohn disease; complicated disease with at least some degree of persistent   active disease on his last scope a year-and-a-half ago.  I suspect it may be   better now on the little higher dose of Humira.  I did not recommend a   colonoscopy at this time.  I think at this point with his age, we are getting to   the point where there may be increased procedural risk.  I did recommend labs,   as in the past, I have recommended labs on every three-month basis.  He only   gets them once a year because that is the only time he is here in the [facility].    He asked for refills on all of his medicine.  I am happy to do that.  I have   recommended Infectious Disease consult to go over all his immunizations,   especially since they plan to do some travel in the [place] this year.    Forty-minute appointment, greater than half of the time in face-to-face   counseling.      TONE  dd: 2018 09:09:02 (CST)  td: 2018 07:44:00 (CST)  Doc ID   #[id number]  Job ID #[id number]    CC:

## 2018-12-03 NOTE — PROGRESS NOTES
Subjective:      Chief Complaint:   Chief Complaint   Patient presents with    Travel Consult     History of Present Illness    Patient  78 y.o. male who presents today for routine pretravel consultation.  The patient reports a past medical history of Crohn's disease, COPD, .  The patient reports the following medication allergies; fosfomax, sulfa drugs.  The patient reports the following food allergies; sesame (anaphylactic reaction) .  The patient will be traveling to [place] 3/2019, [place] 10/2019 on cruises. [place] [employer].   The patient will be at this destination for 10 days for [place], [place] 22 days.  The patient will be lodging at a cruiseship in [place], and [place] (hotels).  The has travelled to the following other countries in the past; [place], [place], [place], [place], [place], [place], [facility], [place] .  The patient reports that they up to date all their childhood vaccinations.  The patient reports receipt of the following travel related vaccinations; tdap .  The purpose of this trip is bucket list, pleasure.      Assessment:     Pre-Travel clinic assessment    Plan:   Patient specific risks:       The Patient was provided with an extensive travel guidance packet which provides travel information specific to the patients itinerary.    The patient's immunization history was reviewed and, based on the patient's itinerary, immunizations were ordered.     -Infectious Disease risks:       Mosquito Borne pathogens:  Reviewed basic mosquito avoidance precautions including wearing long sleeve clothing and insect repellant. The patient was instructed to purchase insect repellent containing DEET or [place] and apply according to repellent label instructions.       Food Borne pathogens:  Reviewed basic hand, food and water sanitation precautions.  Patient instructed to take hand  on their trip.    Blood Borne Pathogens: Patient has not received the hepatitis b vaccination series.     STDs:   Risk of STDs reviewed with the patient. Low risk.      Routine:  Up to date on Tdap/Influenza vaccine. Deferred influenza today.     Environmental risks:   The patient was counseled on:  Precautions to minimize risk/exposure to crime and motor vehicle accidents  Precautions against development of DVT during flight  Precautions to prevent exposure to rabies and seek treatment for possible exposures  Precautions against sun exposure  Personal and travel safety    The patient was instructed to contact us if problems develop after travel.    Rx given for the following vaccines:  1. Hepatitis A/B series   2. Tdap  3. Pneumovax  4. Typhoid vaccine.     Spend >30 minutes on travel counseling

## 2018-12-03 NOTE — LETTER
December 3, 2018      Julio César Lackey MD  [address]  [facility] [address]           [facility] - Infectious Diseases  [address]  [facility] [address]  Phone: [phone]  Fax: [phone]          Patient: Garry Rooney   MR Number: [id number]   YOB: 1940   Date of Visit: 12/3/2018       Dear Dr. Julio César Lackey:    Thank you for referring Garry Rooney to me for evaluation. Attached you will find relevant portions of my assessment and plan of care.    If you have questions, please do not hesitate to call me. I look forward to following Garry Rooney along with you.    Sincerely,    Gaby Alejandre PA-C    Enclosure  CC:  No Recipients    If you would like to receive this communication electronically, please contact [email or web address] or [phone] to request more information on [facility] access.    For providers and/or their staff who would like to refer a patient to [facility], please contact us through our one-stop-shop provider referral line, [facility], at [phone].    If you feel you have received this communication in error or would no longer like to receive these types of communications, please e-mail [email or web address]

## 2018-12-19 ENCOUNTER — TELEPHONE (OUTPATIENT)
Dept: PHARMACY | Facility: CLINIC | Age: 78
End: 2018-12-19

## 2019-01-07 ENCOUNTER — TELEPHONE (OUTPATIENT)
Dept: INTERNAL MEDICINE | Facility: CLINIC | Age: 79
End: 2019-01-07

## 2019-01-07 DIAGNOSIS — K50.80 CROHN'S DISEASE OF BOTH SMALL AND LARGE INTESTINE WITHOUT COMPLICATION: ICD-10-CM

## 2019-01-08 ENCOUNTER — TELEPHONE (OUTPATIENT)
Dept: PAIN MEDICINE | Facility: CLINIC | Age: 79
End: 2019-01-08

## 2019-01-08 RX ORDER — MESALAMINE 800 MG/1
TABLET, DELAYED RELEASE ORAL
Qty: 180 TABLET | Refills: 0 | Status: SHIPPED | OUTPATIENT
Start: 2019-01-08 | End: 2019-04-25 | Stop reason: SDUPTHER

## 2019-01-08 NOTE — TELEPHONE ENCOUNTER
----- Message from Kaylin Devries sent at 1/8/2019  8:24 AM CST -----  Contact: wife  Name of Who is Calling: DALE ARTHUR [[id number]]      What is the request in detail: pt wife returning call.. Please advise      Can the clinic reply by [facility]: no      What Number to Call Back [facility] [phone]

## 2019-01-10 ENCOUNTER — TELEPHONE (OUTPATIENT)
Dept: INTERNAL MEDICINE | Facility: CLINIC | Age: 79
End: 2019-01-10

## 2019-01-10 NOTE — TELEPHONE ENCOUNTER
----- Message from Kaylin Devries sent at 1/10/2019  2:27 PM CST -----  Contact: [employer]  Name of Who is Calling: DALE ARTHUR [[id number]]      What is the request in detail: [employer] calling in regards to fax and a new script.. Pt will be leaving the country in a week.. Please advise      Can the clinic reply by [facility]: no      What Number [facility] Back [facility]: [phone]

## 2019-01-14 ENCOUNTER — TELEPHONE (OUTPATIENT)
Dept: INTERNAL MEDICINE | Facility: CLINIC | Age: 79
End: 2019-01-14

## 2019-01-16 ENCOUNTER — OFFICE VISIT (OUTPATIENT)
Dept: PAIN MEDICINE | Facility: CLINIC | Age: 79
End: 2019-01-16
Attending: ANESTHESIOLOGY
Payer: MEDICARE

## 2019-01-16 VITALS
HEIGHT: 64 IN | SYSTOLIC BLOOD PRESSURE: 136 MMHG | WEIGHT: 153.88 LBS | BODY MASS INDEX: 26.27 KG/M2 | TEMPERATURE: 98 F | DIASTOLIC BLOOD PRESSURE: 79 MMHG | HEART RATE: 74 BPM

## 2019-01-16 DIAGNOSIS — M81.8 STEROID-INDUCED OSTEOPOROSIS: ICD-10-CM

## 2019-01-16 DIAGNOSIS — Z79.899 ENCOUNTER FOR LONG-TERM (CURRENT) USE OF HIGH-RISK MEDICATION: ICD-10-CM

## 2019-01-16 DIAGNOSIS — M47.816 LUMBAR SPONDYLOSIS: ICD-10-CM

## 2019-01-16 DIAGNOSIS — J98.6 DIAPHRAGMATIC DISORDER: Primary | ICD-10-CM

## 2019-01-16 DIAGNOSIS — K50.80 CROHN'S DISEASE OF BOTH SMALL AND LARGE INTESTINE WITHOUT COMPLICATION: ICD-10-CM

## 2019-01-16 DIAGNOSIS — M51.36 DDD (DEGENERATIVE DISC DISEASE), LUMBAR: ICD-10-CM

## 2019-01-16 DIAGNOSIS — R53.81 PHYSICAL DECONDITIONING: ICD-10-CM

## 2019-01-16 DIAGNOSIS — T38.0X5A STEROID-INDUCED OSTEOPOROSIS: ICD-10-CM

## 2019-01-16 DIAGNOSIS — G89.4 CHRONIC PAIN DISORDER: ICD-10-CM

## 2019-01-16 PROCEDURE — 99213 PR OFFICE/OUTPT VISIT, EST, LEVL III, 20-29 MIN: ICD-10-PCS | Mod: S$GLB,,, | Performed by: NURSE PRACTITIONER

## 2019-01-16 PROCEDURE — 3075F SYST BP GE 130 - 139MM HG: CPT | Mod: CPTII,S$GLB,, | Performed by: NURSE PRACTITIONER

## 2019-01-16 PROCEDURE — 99213 OFFICE O/P EST LOW 20 MIN: CPT | Mod: S$GLB,,, | Performed by: NURSE PRACTITIONER

## 2019-01-16 PROCEDURE — 3078F PR MOST RECENT DIASTOLIC BLOOD PRESSURE < 80 MM HG: ICD-10-PCS | Mod: CPTII,S$GLB,, | Performed by: NURSE PRACTITIONER

## 2019-01-16 PROCEDURE — 99999 PR PBB SHADOW E&M-EST. PATIENT-LVL III: CPT | Mod: PBBFAC,,, | Performed by: NURSE PRACTITIONER

## 2019-01-16 PROCEDURE — 3075F PR MOST RECENT SYSTOLIC BLOOD PRESS GE 130-139MM HG: ICD-10-PCS | Mod: CPTII,S$GLB,, | Performed by: NURSE PRACTITIONER

## 2019-01-16 PROCEDURE — 99999 PR PBB SHADOW E&M-EST. PATIENT-LVL III: ICD-10-PCS | Mod: PBBFAC,,, | Performed by: NURSE PRACTITIONER

## 2019-01-16 PROCEDURE — 1101F PR PT FALLS ASSESS DOC 0-1 FALLS W/OUT INJ PAST YR: ICD-10-PCS | Mod: CPTII,S$GLB,, | Performed by: NURSE PRACTITIONER

## 2019-01-16 PROCEDURE — 1101F PT FALLS ASSESS-DOCD LE1/YR: CPT | Mod: CPTII,S$GLB,, | Performed by: NURSE PRACTITIONER

## 2019-01-16 PROCEDURE — 3078F DIAST BP <80 MM HG: CPT | Mod: CPTII,S$GLB,, | Performed by: NURSE PRACTITIONER

## 2019-01-16 RX ORDER — OXYCODONE AND ACETAMINOPHEN 10; 325 MG/1; MG/1
1 TABLET ORAL EVERY 8 HOURS PRN
Qty: 90 TABLET | Refills: 0 | Status: SHIPPED | OUTPATIENT
Start: 2019-02-15 | End: 2019-03-17

## 2019-01-16 RX ORDER — OXYCODONE AND ACETAMINOPHEN 10; 325 MG/1; MG/1
1 TABLET ORAL EVERY 8 HOURS PRN
Qty: 90 TABLET | Refills: 0 | Status: SHIPPED | OUTPATIENT
Start: 2019-01-16 | End: 2019-02-15

## 2019-01-16 RX ORDER — OXYCODONE AND ACETAMINOPHEN 10; 325 MG/1; MG/1
1 TABLET ORAL EVERY 8 HOURS PRN
Qty: 90 TABLET | Refills: 0 | Status: SHIPPED | OUTPATIENT
Start: 2019-03-15 | End: 2019-04-04 | Stop reason: SDUPTHER
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
## 2019-01-18 ENCOUNTER — TELEPHONE (OUTPATIENT)
Dept: PHARMACY | Facility: CLINIC | Age: 79
End: 2019-01-18

## 2019-01-22 ENCOUNTER — OFFICE VISIT (OUTPATIENT)
Dept: INTERNAL MEDICINE | Facility: CLINIC | Age: 79
End: 2019-01-22
Attending: INTERNAL MEDICINE
Payer: MEDICARE

## 2019-01-22 VITALS
HEART RATE: 77 BPM | WEIGHT: 151.25 LBS | OXYGEN SATURATION: 94 % | DIASTOLIC BLOOD PRESSURE: 76 MMHG | SYSTOLIC BLOOD PRESSURE: 118 MMHG | HEIGHT: 64 IN | BODY MASS INDEX: 25.82 KG/M2

## 2019-01-22 DIAGNOSIS — F32.5 MAJOR DEPRESSIVE DISORDER WITH SINGLE EPISODE, IN FULL REMISSION: ICD-10-CM

## 2019-01-22 DIAGNOSIS — C67.9 MALIGNANT NEOPLASM OF URINARY BLADDER, UNSPECIFIED SITE: ICD-10-CM

## 2019-01-22 DIAGNOSIS — I10 ESSENTIAL HYPERTENSION: Primary | ICD-10-CM

## 2019-01-22 DIAGNOSIS — F11.20 UNCOMPLICATED OPIOID DEPENDENCE: ICD-10-CM

## 2019-01-22 DIAGNOSIS — N40.0 BENIGN PROSTATIC HYPERPLASIA WITHOUT LOWER URINARY TRACT SYMPTOMS: ICD-10-CM

## 2019-01-22 DIAGNOSIS — M47.899 FACET SYNDROME: ICD-10-CM

## 2019-01-22 PROCEDURE — 1101F PT FALLS ASSESS-DOCD LE1/YR: CPT | Mod: CPTII,S$GLB,, | Performed by: INTERNAL MEDICINE

## 2019-01-22 PROCEDURE — 99999 PR PBB SHADOW E&M-EST. PATIENT-LVL IV: CPT | Mod: PBBFAC,,, | Performed by: INTERNAL MEDICINE

## 2019-01-22 PROCEDURE — 3078F DIAST BP <80 MM HG: CPT | Mod: CPTII,S$GLB,, | Performed by: INTERNAL MEDICINE

## 2019-01-22 PROCEDURE — 3078F PR MOST RECENT DIASTOLIC BLOOD PRESSURE < 80 MM HG: ICD-10-PCS | Mod: CPTII,S$GLB,, | Performed by: INTERNAL MEDICINE

## 2019-01-22 PROCEDURE — 99214 PR OFFICE/OUTPT VISIT, EST, LEVL IV, 30-39 MIN: ICD-10-PCS | Mod: S$GLB,,, | Performed by: INTERNAL MEDICINE

## 2019-01-22 PROCEDURE — 1101F PR PT FALLS ASSESS DOC 0-1 FALLS W/OUT INJ PAST YR: ICD-10-PCS | Mod: CPTII,S$GLB,, | Performed by: INTERNAL MEDICINE

## 2019-01-22 PROCEDURE — 3074F SYST BP LT 130 MM HG: CPT | Mod: CPTII,S$GLB,, | Performed by: INTERNAL MEDICINE

## 2019-01-22 PROCEDURE — 99999 PR PBB SHADOW E&M-EST. PATIENT-LVL IV: ICD-10-PCS | Mod: PBBFAC,,, | Performed by: INTERNAL MEDICINE

## 2019-01-22 PROCEDURE — 99214 OFFICE O/P EST MOD 30 MIN: CPT | Mod: S$GLB,,, | Performed by: INTERNAL MEDICINE

## 2019-01-22 PROCEDURE — 3074F PR MOST RECENT SYSTOLIC BLOOD PRESSURE < 130 MM HG: ICD-10-PCS | Mod: CPTII,S$GLB,, | Performed by: INTERNAL MEDICINE

## 2019-01-22 RX ORDER — FINASTERIDE 5 MG/1
5 TABLET, FILM COATED ORAL DAILY
Qty: 90 TABLET | Refills: 3 | Status: SHIPPED | OUTPATIENT
Start: 2019-01-22 | End: 2020-02-11 | Stop reason: SDUPTHER

## 2019-01-22 RX ORDER — VALSARTAN 40 MG/1
40 TABLET ORAL DAILY
Qty: 90 TABLET | Refills: 3 | Status: SHIPPED | OUTPATIENT
Start: 2019-01-22 | End: 2020-04-07 | Stop reason: SDUPTHER

## 2019-01-22 RX ORDER — VENLAFAXINE HYDROCHLORIDE 75 MG/1
CAPSULE, EXTENDED RELEASE ORAL
Qty: 180 CAPSULE | Refills: 3 | Status: SHIPPED | OUTPATIENT
Start: 2019-01-22 | End: 2020-01-20
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
## 2019-02-08 ENCOUNTER — TELEPHONE (OUTPATIENT)
Dept: PHARMACY | Facility: CLINIC | Age: 79
End: 2019-02-08

## 2019-02-25 NOTE — TELEPHONE ENCOUNTER
Refill call for humira.  Patient wife confirmed they are in need of a refill. Will prepare for  ship on 2/28 to arrive 3/1 with patient consent.

## 2019-03-11 ENCOUNTER — TELEPHONE (OUTPATIENT)
Dept: GASTROENTEROLOGY | Facility: CLINIC | Age: 79
End: 2019-03-11

## 2019-03-11 DIAGNOSIS — K50.80 CROHN'S DISEASE OF BOTH SMALL AND LARGE INTESTINE WITHOUT COMPLICATION: Primary | ICD-10-CM

## 2019-03-11 NOTE — TELEPHONE ENCOUNTER
Returned call, no answer, message left that labs work has been set up for 4/1 at 9:15.  Will mail confirmation.

## 2019-03-11 NOTE — TELEPHONE ENCOUNTER
----- Message from Julio César Lackey MD sent at 3/11/2019 11:07 AM CDT -----  Contact: Anne Rooney (spouse): [phone]  done  ----- Message -----  From: Cee Nichols MA  Sent: 3/11/2019  10:44 AM  To: Julio César Lackey MD     Spoke with .  She stated you wanted blood work a couple of times a year.  They would like to schedule labs 4/1 at the [facility].  Will need orders.   ----- Message -----  From: Chasidy Hudson  Sent: 3/6/2019   9:04 AM  To: Ziyad WATKINS Staff    Needs Advice    Reason for call: pt's spouse stated dr. Lackey requires blood work to be done and would like to know can the blood work be done on 4/1 at the [facility]         Communication Preference: Anne Rooney (spouse): [phone]

## 2019-03-22 ENCOUNTER — PATIENT OUTREACH (OUTPATIENT)
Dept: ADMINISTRATIVE | Facility: HOSPITAL | Age: 79
End: 2019-03-22

## 2019-03-25 ENCOUNTER — TELEPHONE (OUTPATIENT)
Dept: PHARMACY | Facility: CLINIC | Age: 79
End: 2019-03-25

## 2019-03-25 ENCOUNTER — OFFICE VISIT (OUTPATIENT)
Dept: INTERNAL MEDICINE | Facility: CLINIC | Age: 79
End: 2019-03-25
Attending: INTERNAL MEDICINE
Payer: MEDICARE

## 2019-03-25 VITALS
HEIGHT: 64 IN | OXYGEN SATURATION: 96 % | DIASTOLIC BLOOD PRESSURE: 68 MMHG | WEIGHT: 145.94 LBS | SYSTOLIC BLOOD PRESSURE: 120 MMHG | BODY MASS INDEX: 24.92 KG/M2 | HEART RATE: 87 BPM

## 2019-03-25 DIAGNOSIS — C67.9 MALIGNANT NEOPLASM OF URINARY BLADDER, UNSPECIFIED SITE: ICD-10-CM

## 2019-03-25 DIAGNOSIS — K11.20 PAROTIDITIS: Primary | ICD-10-CM

## 2019-03-25 PROCEDURE — 99999 PR PBB SHADOW E&M-EST. PATIENT-LVL IV: CPT | Mod: PBBFAC,,, | Performed by: INTERNAL MEDICINE

## 2019-03-25 PROCEDURE — 1101F PT FALLS ASSESS-DOCD LE1/YR: CPT | Mod: CPTII,S$GLB,, | Performed by: INTERNAL MEDICINE

## 2019-03-25 PROCEDURE — 99999 PR PBB SHADOW E&M-EST. PATIENT-LVL IV: ICD-10-PCS | Mod: PBBFAC,,, | Performed by: INTERNAL MEDICINE

## 2019-03-25 PROCEDURE — 3078F PR MOST RECENT DIASTOLIC BLOOD PRESSURE < 80 MM HG: ICD-10-PCS | Mod: CPTII,S$GLB,, | Performed by: INTERNAL MEDICINE

## 2019-03-25 PROCEDURE — 99214 OFFICE O/P EST MOD 30 MIN: CPT | Mod: S$GLB,,, | Performed by: INTERNAL MEDICINE

## 2019-03-25 PROCEDURE — 99214 PR OFFICE/OUTPT VISIT, EST, LEVL IV, 30-39 MIN: ICD-10-PCS | Mod: S$GLB,,, | Performed by: INTERNAL MEDICINE

## 2019-03-25 PROCEDURE — 3078F DIAST BP <80 MM HG: CPT | Mod: CPTII,S$GLB,, | Performed by: INTERNAL MEDICINE

## 2019-03-25 PROCEDURE — 3074F SYST BP LT 130 MM HG: CPT | Mod: CPTII,S$GLB,, | Performed by: INTERNAL MEDICINE

## 2019-03-25 PROCEDURE — 1101F PR PT FALLS ASSESS DOC 0-1 FALLS W/OUT INJ PAST YR: ICD-10-PCS | Mod: CPTII,S$GLB,, | Performed by: INTERNAL MEDICINE

## 2019-03-25 PROCEDURE — 3074F PR MOST RECENT SYSTOLIC BLOOD PRESSURE < 130 MM HG: ICD-10-PCS | Mod: CPTII,S$GLB,, | Performed by: INTERNAL MEDICINE

## 2019-03-25 NOTE — PROGRESS NOTES
"Subjective:       Patient ID: Garry Rooney is a 78 y.o. male.    Chief Complaint: Abscess (left inner jaw x 1 week )    Here for urgent visit    3/15/19 while on a cruise in [place] overnight developed sweling of left side of face. He was seen by MD and given Iv abx x2 days and discharged with cefdinir and clindamycin for 7 days, completed today. Swelling much improved but has some residual swelling and tenderness. He reports his left jaw is tight. He denies F/C, myalgias, malaise, sore throat, eye pain, ear pain, dry eyes, facial asymmetry, dry mouth. MD concerned for salivary abscess and recommended imaging. He and is wife decided against this. He is on Humira and azathiprine  for Crohn's      Review of Systems   Constitutional: Negative for appetite change, chills, fever and unexpected weight change.   HENT: Negative for hearing loss, sore throat and trouble swallowing.    Eyes: Negative for visual disturbance.   Respiratory: Negative for cough, chest tightness and shortness of breath.    Cardiovascular: Negative for chest pain and leg swelling.   Gastrointestinal: Negative for abdominal pain, blood in stool, constipation, diarrhea, nausea and vomiting.   Endocrine: Negative for polydipsia and polyuria.   Genitourinary: Negative for decreased urine volume, difficulty urinating, dysuria, frequency and urgency.   Musculoskeletal: Negative for gait problem.   Skin: Negative for rash.   Neurological: Negative for dizziness and numbness.   Psychiatric/Behavioral: The patient is not nervous/anxious.        Objective:      Vitals:    03/25/19 1342   BP: 120/68   Pulse: 87   SpO2: 96%   Weight: 66.2 kg (145 lb 15.1 oz)   Height: 5' 4" (1.626 m)      Physical Exam   Constitutional: He is oriented to person, place, and time. He appears well-developed and well-nourished. No distress.   HENT:   Head: Normocephalic and atraumatic.       Mouth/Throat: Oropharynx is clear and moist. He has dentures. No oropharyngeal exudate. "   edentulous   Eyes: Pupils are equal, round, and reactive to light. Conjunctivae and EOM are normal. No scleral icterus.   Neck: No thyromegaly present.   Cardiovascular: Normal rate, regular rhythm and normal heart sounds.   No murmur heard.  Pulmonary/Chest: Effort normal and breath sounds normal. He has no wheezes. He has no rales.   Abdominal: Soft. He exhibits no distension. There is no tenderness.   Musculoskeletal: He exhibits no edema or tenderness.   Lymphadenopathy:        Head (right side): No submental, no submandibular, no tonsillar, no preauricular, no posterior auricular and no occipital adenopathy present.        Head (left side): Tonsillar adenopathy present. No submental, no submandibular, no preauricular, no posterior auricular and no occipital adenopathy present.     He has no cervical adenopathy.        Right cervical: No superficial cervical, no deep cervical and no posterior cervical adenopathy present.       Left cervical: No superficial cervical, no deep cervical and no posterior cervical adenopathy present.   Neurological: He is alert and oriented to person, place, and time.   Skin: Skin is warm and dry.   Psychiatric: He has a normal mood and affect. His behavior is normal.       Assessment:       1. Parotiditis    2. Malignant neoplasm of urinary bladder, unspecified site        Plan:       Garry was seen today for abscess.    Diagnoses and all orders for this visit:    Parotiditis  completed 7 days of cefdinir and clindamycin. Appears resolved. No abscess appreciated on exam. If symptoms return will image to r/o abscess               Side effects of medication(s) were discussed in detail and patient voiced understanding.  Patient will call back for any issues or complications.

## 2019-04-01 ENCOUNTER — LAB VISIT (OUTPATIENT)
Dept: LAB | Facility: OTHER | Age: 79
End: 2019-04-01
Attending: INTERNAL MEDICINE
Payer: MEDICARE

## 2019-04-01 ENCOUNTER — OFFICE VISIT (OUTPATIENT)
Dept: PAIN MEDICINE | Facility: CLINIC | Age: 79
End: 2019-04-01
Attending: ANESTHESIOLOGY
Payer: MEDICARE

## 2019-04-01 VITALS
TEMPERATURE: 98 F | HEIGHT: 64 IN | RESPIRATION RATE: 18 BRPM | WEIGHT: 143 LBS | BODY MASS INDEX: 24.41 KG/M2 | SYSTOLIC BLOOD PRESSURE: 127 MMHG | HEART RATE: 71 BPM | DIASTOLIC BLOOD PRESSURE: 74 MMHG

## 2019-04-01 DIAGNOSIS — K50.80 CROHN'S DISEASE OF BOTH SMALL AND LARGE INTESTINE WITHOUT COMPLICATION: ICD-10-CM

## 2019-04-01 DIAGNOSIS — S22.000A COMPRESSION FRACTURE OF BODY OF THORACIC VERTEBRA: Primary | ICD-10-CM

## 2019-04-01 DIAGNOSIS — G89.4 CHRONIC PAIN SYNDROME: ICD-10-CM

## 2019-04-01 LAB
ALBUMIN SERPL BCP-MCNC: 3.5 G/DL (ref 3.5–5.2)
ALP SERPL-CCNC: 73 U/L (ref 55–135)
ALT SERPL W/O P-5'-P-CCNC: 15 U/L (ref 10–44)
ANION GAP SERPL CALC-SCNC: 9 MMOL/L (ref 8–16)
AST SERPL-CCNC: 22 U/L (ref 10–40)
BASOPHILS # BLD AUTO: 0.02 K/UL (ref 0–0.2)
BASOPHILS NFR BLD: 0.3 % (ref 0–1.9)
BILIRUB SERPL-MCNC: 0.4 MG/DL (ref 0.1–1)
BUN SERPL-MCNC: 24 MG/DL (ref 8–23)
CALCIUM SERPL-MCNC: 9.2 MG/DL (ref 8.7–10.5)
CHLORIDE SERPL-SCNC: 105 MMOL/L (ref 95–110)
CO2 SERPL-SCNC: 26 MMOL/L (ref 23–29)
CREAT SERPL-MCNC: 0.8 MG/DL (ref 0.5–1.4)
CRP SERPL-MCNC: 2.4 MG/L (ref 0–8.2)
DIFFERENTIAL METHOD: ABNORMAL
EOSINOPHIL # BLD AUTO: 0.2 K/UL (ref 0–0.5)
EOSINOPHIL NFR BLD: 3.3 % (ref 0–8)
ERYTHROCYTE [DISTWIDTH] IN BLOOD BY AUTOMATED COUNT: 16.2 % (ref 11.5–14.5)
EST. GFR  (AFRICAN AMERICAN): >60 ML/MIN/1.73 M^2
EST. GFR  (NON AFRICAN AMERICAN): >60 ML/MIN/1.73 M^2
GLUCOSE SERPL-MCNC: 89 MG/DL (ref 70–110)
HCT VFR BLD AUTO: 41.9 % (ref 40–54)
HGB BLD-MCNC: 13.5 G/DL (ref 14–18)
LYMPHOCYTES # BLD AUTO: 2.2 K/UL (ref 1–4.8)
LYMPHOCYTES NFR BLD: 29.3 % (ref 18–48)
MCH RBC QN AUTO: 31.9 PG (ref 27–31)
MCHC RBC AUTO-ENTMCNC: 32.2 G/DL (ref 32–36)
MCV RBC AUTO: 99 FL (ref 82–98)
MONOCYTES # BLD AUTO: 0.5 K/UL (ref 0.3–1)
MONOCYTES NFR BLD: 7.1 % (ref 4–15)
NEUTROPHILS # BLD AUTO: 4.4 K/UL (ref 1.8–7.7)
NEUTROPHILS NFR BLD: 59.7 % (ref 38–73)
PLATELET # BLD AUTO: 368 K/UL (ref 150–350)
PMV BLD AUTO: 10.7 FL (ref 9.2–12.9)
POTASSIUM SERPL-SCNC: 4.3 MMOL/L (ref 3.5–5.1)
PROT SERPL-MCNC: 7.3 G/DL (ref 6–8.4)
RBC # BLD AUTO: 4.23 M/UL (ref 4.6–6.2)
SODIUM SERPL-SCNC: 140 MMOL/L (ref 136–145)
WBC # BLD AUTO: 7.34 K/UL (ref 3.9–12.7)

## 2019-04-01 PROCEDURE — 1101F PR PT FALLS ASSESS DOC 0-1 FALLS W/OUT INJ PAST YR: ICD-10-PCS | Mod: CPTII,S$GLB,, | Performed by: ANESTHESIOLOGY

## 2019-04-01 PROCEDURE — 3078F DIAST BP <80 MM HG: CPT | Mod: CPTII,S$GLB,, | Performed by: ANESTHESIOLOGY

## 2019-04-01 PROCEDURE — 86140 C-REACTIVE PROTEIN: CPT

## 2019-04-01 PROCEDURE — 36415 COLL VENOUS BLD VENIPUNCTURE: CPT

## 2019-04-01 PROCEDURE — 1101F PT FALLS ASSESS-DOCD LE1/YR: CPT | Mod: CPTII,S$GLB,, | Performed by: ANESTHESIOLOGY

## 2019-04-01 PROCEDURE — 99999 PR PBB SHADOW E&M-EST. PATIENT-LVL III: CPT | Mod: PBBFAC,,, | Performed by: ANESTHESIOLOGY

## 2019-04-01 PROCEDURE — 3078F PR MOST RECENT DIASTOLIC BLOOD PRESSURE < 80 MM HG: ICD-10-PCS | Mod: CPTII,S$GLB,, | Performed by: ANESTHESIOLOGY

## 2019-04-01 PROCEDURE — 3074F SYST BP LT 130 MM HG: CPT | Mod: CPTII,S$GLB,, | Performed by: ANESTHESIOLOGY

## 2019-04-01 PROCEDURE — 3074F PR MOST RECENT SYSTOLIC BLOOD PRESSURE < 130 MM HG: ICD-10-PCS | Mod: CPTII,S$GLB,, | Performed by: ANESTHESIOLOGY

## 2019-04-01 PROCEDURE — 99214 OFFICE O/P EST MOD 30 MIN: CPT | Mod: S$GLB,,, | Performed by: ANESTHESIOLOGY

## 2019-04-01 PROCEDURE — 99999 PR PBB SHADOW E&M-EST. PATIENT-LVL III: ICD-10-PCS | Mod: PBBFAC,,, | Performed by: ANESTHESIOLOGY

## 2019-04-01 PROCEDURE — 99214 PR OFFICE/OUTPT VISIT, EST, LEVL IV, 30-39 MIN: ICD-10-PCS | Mod: S$GLB,,, | Performed by: ANESTHESIOLOGY

## 2019-04-01 PROCEDURE — 85025 COMPLETE CBC W/AUTO DIFF WBC: CPT

## 2019-04-01 PROCEDURE — 80053 COMPREHEN METABOLIC PANEL: CPT

## 2019-04-01 RX ORDER — OXYCODONE AND ACETAMINOPHEN 10; 325 MG/1; MG/1
1 TABLET ORAL 3 TIMES DAILY PRN
Qty: 90 TABLET | Refills: 0 | Status: SHIPPED | OUTPATIENT
Start: 2019-06-01 | End: 2019-06-30

## 2019-04-01 RX ORDER — OXYCODONE AND ACETAMINOPHEN 10; 325 MG/1; MG/1
1 TABLET ORAL 3 TIMES DAILY PRN
Qty: 90 TABLET | Refills: 0 | Status: SHIPPED | OUTPATIENT
Start: 2019-04-01 | End: 2019-04-04 | Stop reason: SDUPTHER

## 2019-04-01 RX ORDER — OXYCODONE AND ACETAMINOPHEN 10; 325 MG/1; MG/1
1 TABLET ORAL 3 TIMES DAILY PRN
Qty: 90 TABLET | Refills: 0 | Status: SHIPPED | OUTPATIENT
Start: 2019-05-02 | End: 2019-04-04 | Stop reason: SDUPTHER
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
## 2019-04-04 ENCOUNTER — OFFICE VISIT (OUTPATIENT)
Dept: OTOLARYNGOLOGY | Facility: CLINIC | Age: 79
End: 2019-04-04
Payer: MEDICARE

## 2019-04-04 ENCOUNTER — HOSPITAL ENCOUNTER (OUTPATIENT)
Dept: RADIOLOGY | Facility: OTHER | Age: 79
Discharge: HOME | End: 2019-04-04
Attending: INTERNAL MEDICINE
Payer: MEDICARE

## 2019-04-04 ENCOUNTER — OFFICE VISIT (OUTPATIENT)
Dept: INTERNAL MEDICINE | Facility: CLINIC | Age: 79
End: 2019-04-04
Attending: INTERNAL MEDICINE
Payer: MEDICARE

## 2019-04-04 ENCOUNTER — TELEPHONE (OUTPATIENT)
Dept: INTERNAL MEDICINE | Facility: CLINIC | Age: 79
End: 2019-04-04

## 2019-04-04 VITALS
HEIGHT: 64 IN | BODY MASS INDEX: 25.15 KG/M2 | HEART RATE: 91 BPM | WEIGHT: 147.31 LBS | DIASTOLIC BLOOD PRESSURE: 78 MMHG | TEMPERATURE: 99 F | SYSTOLIC BLOOD PRESSURE: 123 MMHG

## 2019-04-04 VITALS
BODY MASS INDEX: 25.14 KG/M2 | DIASTOLIC BLOOD PRESSURE: 70 MMHG | HEIGHT: 64 IN | WEIGHT: 147.25 LBS | SYSTOLIC BLOOD PRESSURE: 123 MMHG | HEART RATE: 90 BPM | OXYGEN SATURATION: 95 %

## 2019-04-04 DIAGNOSIS — C67.9 MALIGNANT NEOPLASM OF URINARY BLADDER, UNSPECIFIED SITE: ICD-10-CM

## 2019-04-04 DIAGNOSIS — R22.0 LEFT FACIAL SWELLING: ICD-10-CM

## 2019-04-04 DIAGNOSIS — J34.2 NASAL SEPTAL DEVIATION: ICD-10-CM

## 2019-04-04 DIAGNOSIS — L08.9 FACIAL INFECTION: ICD-10-CM

## 2019-04-04 DIAGNOSIS — L04.0 ACUTE CERVICAL ADENITIS: ICD-10-CM

## 2019-04-04 DIAGNOSIS — R22.0 LEFT FACIAL SWELLING: Primary | ICD-10-CM

## 2019-04-04 DIAGNOSIS — K11.21 ACUTE PAROTITIS: Primary | ICD-10-CM

## 2019-04-04 DIAGNOSIS — Z79.620 ADALIMUMAB (HUMIRA) LONG-TERM USE: ICD-10-CM

## 2019-04-04 PROCEDURE — 3074F PR MOST RECENT SYSTOLIC BLOOD PRESSURE < 130 MM HG: ICD-10-PCS | Mod: CPTII,S$GLB,, | Performed by: INTERNAL MEDICINE

## 2019-04-04 PROCEDURE — 70491 CT SOFT TISSUE NECK W/DYE: CPT | Mod: 26,,, | Performed by: RADIOLOGY

## 2019-04-04 PROCEDURE — 99204 OFFICE O/P NEW MOD 45 MIN: CPT | Mod: 25,S$GLB,, | Performed by: SPECIALIST

## 2019-04-04 PROCEDURE — 3078F DIAST BP <80 MM HG: CPT | Mod: CPTII,S$GLB,, | Performed by: SPECIALIST

## 2019-04-04 PROCEDURE — 3078F DIAST BP <80 MM HG: CPT | Mod: CPTII,S$GLB,, | Performed by: INTERNAL MEDICINE

## 2019-04-04 PROCEDURE — 3078F PR MOST RECENT DIASTOLIC BLOOD PRESSURE < 80 MM HG: ICD-10-PCS | Mod: CPTII,S$GLB,, | Performed by: INTERNAL MEDICINE

## 2019-04-04 PROCEDURE — 1101F PR PT FALLS ASSESS DOC 0-1 FALLS W/OUT INJ PAST YR: ICD-10-PCS | Mod: CPTII,S$GLB,, | Performed by: SPECIALIST

## 2019-04-04 PROCEDURE — 99999 PR PBB SHADOW E&M-EST. PATIENT-LVL IV: ICD-10-PCS | Mod: PBBFAC,,, | Performed by: INTERNAL MEDICINE

## 2019-04-04 PROCEDURE — 99214 OFFICE O/P EST MOD 30 MIN: CPT | Mod: S$GLB,,, | Performed by: INTERNAL MEDICINE

## 2019-04-04 PROCEDURE — 3074F PR MOST RECENT SYSTOLIC BLOOD PRESSURE < 130 MM HG: ICD-10-PCS | Mod: CPTII,S$GLB,, | Performed by: SPECIALIST

## 2019-04-04 PROCEDURE — 3074F SYST BP LT 130 MM HG: CPT | Mod: CPTII,S$GLB,, | Performed by: SPECIALIST

## 2019-04-04 PROCEDURE — 3074F SYST BP LT 130 MM HG: CPT | Mod: CPTII,S$GLB,, | Performed by: INTERNAL MEDICINE

## 2019-04-04 PROCEDURE — 25500020 PHARM REV CODE 255: Performed by: INTERNAL MEDICINE

## 2019-04-04 PROCEDURE — 1101F PT FALLS ASSESS-DOCD LE1/YR: CPT | Mod: CPTII,S$GLB,, | Performed by: INTERNAL MEDICINE

## 2019-04-04 PROCEDURE — 70491 CT SOFT TISSUE NECK W/DYE: CPT | Mod: TC

## 2019-04-04 PROCEDURE — 1101F PT FALLS ASSESS-DOCD LE1/YR: CPT | Mod: CPTII,S$GLB,, | Performed by: SPECIALIST

## 2019-04-04 PROCEDURE — 70491 CT SOFT TISSUE NECK WITH CONTRAST: ICD-10-PCS | Mod: 26,,, | Performed by: RADIOLOGY

## 2019-04-04 PROCEDURE — 1101F PR PT FALLS ASSESS DOC 0-1 FALLS W/OUT INJ PAST YR: ICD-10-PCS | Mod: CPTII,S$GLB,, | Performed by: INTERNAL MEDICINE

## 2019-04-04 PROCEDURE — 3078F PR MOST RECENT DIASTOLIC BLOOD PRESSURE < 80 MM HG: ICD-10-PCS | Mod: CPTII,S$GLB,, | Performed by: SPECIALIST

## 2019-04-04 PROCEDURE — 99204 PR OFFICE/OUTPT VISIT, NEW, LEVL IV, 45-59 MIN: ICD-10-PCS | Mod: 25,S$GLB,, | Performed by: SPECIALIST

## 2019-04-04 PROCEDURE — 96372 THER/PROPH/DIAG INJ SC/IM: CPT | Mod: S$GLB,,, | Performed by: SPECIALIST

## 2019-04-04 PROCEDURE — 99214 PR OFFICE/OUTPT VISIT, EST, LEVL IV, 30-39 MIN: ICD-10-PCS | Mod: S$GLB,,, | Performed by: INTERNAL MEDICINE

## 2019-04-04 PROCEDURE — 96372 PR INJECTION,THERAP/PROPH/DIAG2ST, IM OR SUBCUT: ICD-10-PCS | Mod: S$GLB,,, | Performed by: SPECIALIST

## 2019-04-04 PROCEDURE — 99999 PR PBB SHADOW E&M-EST. PATIENT-LVL IV: CPT | Mod: PBBFAC,,, | Performed by: INTERNAL MEDICINE

## 2019-04-04 RX ORDER — AMOXICILLIN AND CLAVULANATE POTASSIUM 875; 125 MG/1; MG/1
1 TABLET, FILM COATED ORAL 2 TIMES DAILY
Qty: 28 TABLET | Refills: 0 | Status: SHIPPED | OUTPATIENT
Start: 2019-04-04 | End: 2019-04-18

## 2019-04-04 RX ORDER — LIDOCAINE HYDROCHLORIDE 10 MG/ML
2 INJECTION INFILTRATION; PERINEURAL ONCE
Status: COMPLETED | OUTPATIENT
Start: 2019-04-04 | End: 2019-04-04

## 2019-04-04 RX ORDER — CEFTRIAXONE 1 G/1
1 INJECTION, POWDER, FOR SOLUTION INTRAMUSCULAR; INTRAVENOUS
Status: COMPLETED | OUTPATIENT
Start: 2019-04-04 | End: 2019-04-04

## 2019-04-04 RX ORDER — AMOXICILLIN AND CLAVULANATE POTASSIUM 875; 125 MG/1; MG/1
1 TABLET, FILM COATED ORAL 2 TIMES DAILY
Qty: 28 TABLET | Refills: 0 | Status: SHIPPED | OUTPATIENT
Start: 2019-04-04 | End: 2019-04-04

## 2019-04-04 RX ORDER — BETAMETHASONE SODIUM PHOSPHATE AND BETAMETHASONE ACETATE 3; 3 MG/ML; MG/ML
6 INJECTION, SUSPENSION INTRA-ARTICULAR; INTRALESIONAL; INTRAMUSCULAR; SOFT TISSUE ONCE
Status: COMPLETED | OUTPATIENT
Start: 2019-04-04 | End: 2019-04-04

## 2019-04-04 RX ADMIN — CEFTRIAXONE 1 G: 1 INJECTION, POWDER, FOR SOLUTION INTRAMUSCULAR; INTRAVENOUS at 04:04

## 2019-04-04 RX ADMIN — IOHEXOL 100 ML: 350 INJECTION, SOLUTION INTRAVENOUS at 03:04

## 2019-04-04 RX ADMIN — LIDOCAINE HYDROCHLORIDE 2 ML: 10 INJECTION INFILTRATION; PERINEURAL at 04:04

## 2019-04-04 RX ADMIN — BETAMETHASONE SODIUM PHOSPHATE AND BETAMETHASONE ACETATE 6 MG: 3; 3 INJECTION, SUSPENSION INTRA-ARTICULAR; INTRALESIONAL; INTRAMUSCULAR; SOFT TISSUE at 04:04

## 2019-04-04 NOTE — PATIENT INSTRUCTIONS
"  Otitis  Otitis  Salivary Gland Infection  Salivary glands make saliva. Saliva is mostly water. It also has minerals and proteins that help break down food and keep the mouth and teeth healthy. There are three pairs of salivary glands:  · Parotid glands (in front of the ear)  · Submandibular glands (below the jaw)  · Sublingual glands (below the tongue)  A salivary gland can become infected by bacteria (germs). Things that make this more likely include dehydration and taking medicines that affect saliva flow. Infection is also more likely when the duct (channel) that carries saliva from the gland to the mouth is blocked. It may be blocked by a salivary gland "stone." This is a collection of minerals that forms in the salivary gland.  Signs of infection include fever, severe pain in the gland, and redness and swelling over the gland. It may hurt to open the mouth. Symptoms may be worse when the flow of saliva is stimulated, such as by the smell of food.   Antibiotics are used to treat the infection. Drainage of the infection with a simple surgical procedure is sometimes needed. It a salivary gland stone is present, a procedure may be done to remove it.  Home Care:  · Take antibiotics as directed until they are finished. Do this even if you start to feel better after only a few days.  · Unless another medicine was prescribed, take over-the-counter medicines, such as acetaminophen or ibuprofen, to help relieve pain.  · Moist heat can also help relieve swelling and pain. Wet a cloth with warm water and put it over the affected gland for 10-15 minutes several times a day.  · Gently massage the gland a few times a day.   · Suck on lemon or other tart hard candies to encourage flow of saliva.  To help prevent blockages and infections:  · Drink 6-8 glasses of fluid per day (such as water, tea, and clear soup) to keep well hydrated.  · If you smoke, ask your healthcare provider for help to quit. Smoking makes salivary gland " stones more likely.  · Maintain good dental hygiene. Brush and floss your teeth daily. See your dentist for regular cleanings.  Follow-up care  Follow up with your healthcare provider or as advised.   When to seek medical advice  Call your healthcare provider if any of the following occur:  · Fever over 100.4°F (38ºC) after two days of taking antibiotics  · Symptoms get worse or don't improve within a few days  · Trouble breathing or swallowing  Date Last Reviewed: 5/4/2015 © 2000-2017 [employer]. [address] [place] [address], [place] [address]. All rights reserved. This information is not intended as a substitute for professional medical care. Always follow your healthcare professional's instructions.        Understanding Photodynamic Therapy for Age-Related Macular Degeneration (AMD)  Photodynamic therapy is a treatment for the eyes. It uses lasers and a special medicine that works when exposed to a certain type of light. It is done to treat age-related macular degeneration (AMD). AMD is a condition that can lead to loss of eyesight. Photodynamic therapy uses a light-sensitive medicine and a laser to seal off abnormal blood vessels in your eye. It cant restore eyesight that you have already lost. But it may slow down the damage to your central vision.  What is AMD?  The retina is a layer of cells in the back of your eye. It converts light into electrical signals. Your retina then sends these signals to your brain. The macula is the sensitive center part of your retina. This area gives you detailed vision in the middle of your visual field. AMD damages your macula. The macula may become thinner and deposits of material can develop beneath it. Blood vessels may start growing beneath your macula. This can cause fluid to leak beneath your macula. This extra fluid can lead to eyesight loss.  AMD has 2 types: dry type and wet type. Abnormal blood vessel growth is present in only the wet type. AMD is a common  cause of severe eyesight loss in older adults. In rare cases, it can result in total blindness. Because AMD affects the macula, you may still have your side (peripheral) vision. But you may have a slow or sudden loss of central vision.  Why photodynamic therapy for AMD is done  Photodynamic therapy is 1 type of treatment for AMD. It is a choice only for certain people with wet type AMD. It may be advised if your eyesight loss comes on slowly over time, instead of suddenly. The treatment is used less often now that there is medicine to slow down the growth of abnormal blood vessels. But your healthcare provider may advise the therapy in addition to medicine.  How photodynamic therapy for AMD is done   You will get an injection of a light-sensitive medicine. Eye drops will be used to dilate your pupil. It will stay dilated for several hours after the procedure. A special type of contact lens will be put into the affected eye. This lens helps focus a beam of laser light on the retina using a tool called a slit lamp. Your eye care doctor will shine a laser in the exact spot in your eye. This will activate the light-sensitive medicine. It will form blood clots in the abnormal vessels beneath your macula. This seals off the abnormal blood vessels.  Risks of photodynamic therapy  All procedures have risks. The risks of this procedure include:  · Temporary loss of visual sharpness, which in rare cases can be severe  · A new blind spot  · Reactions where you had the light-activated medicine injected  · Back pain because of injection of the medicine  · Photosensitivity reactions like sunburn, if exposed to sunlight right after the procedure  · Only short-term relief from AMD symptoms, because the blood vessels open again  Your risks may differ according to your age, your general health, and the type of your AMD. Ask your healthcare provider which risks apply most to you.   Date Last Reviewed: 2/1/2017  © 7509-5567 The  [employer]. [address] [place] [address]. All rights reserved. This information is not intended as a substitute for professional medical care. Always follow your healthcare professional's instructions.        Understanding Photodynamic Therapy for Age-Related Macular Degeneration (AMD)  Photodynamic therapy is a treatment for the eyes. It uses lasers and a special medicine that works when exposed to a certain type of light. It is done to treat age-related macular degeneration (AMD). AMD is a condition that can lead to loss of eyesight. Photodynamic therapy uses a light-sensitive medicine and a laser to seal off abnormal blood vessels in your eye. It cant restore eyesight that you have already lost. But it may slow down the damage to your central vision.  What is AMD?  The retina is a layer of cells in the back of your eye. It converts light into electrical signals. Your retina then sends these signals to your brain. The macula is the sensitive center part of your retina. This area gives you detailed vision in the middle of your visual field. AMD damages your macula. The macula may become thinner and deposits of material can develop beneath it. Blood vessels may start growing beneath your macula. This can cause fluid to leak beneath your macula. This extra fluid can lead to eyesight loss.  AMD has 2 types: dry type and wet type. Abnormal blood vessel growth is present in only the wet type. AMD is a common cause of severe eyesight loss in older adults. In rare cases, it can result in total blindness. Because AMD affects the macula, you may still have your side (peripheral) vision. But you may have a slow or sudden loss of central vision.  Why photodynamic therapy for AMD is done  Photodynamic therapy is 1 type of treatment for AMD. It is a choice only for certain people with wet type AMD. It may be advised if your eyesight loss comes on slowly over time, instead of suddenly. The treatment is  used less often now that there is medicine to slow down the growth of abnormal blood vessels. But your healthcare provider may advise the therapy in addition to medicine.  How photodynamic therapy for AMD is done   You will get an injection of a light-sensitive medicine. Eye drops will be used to dilate your pupil. It will stay dilated for several hours after the procedure. A special type of contact lens will be put into the affected eye. This lens helps focus a beam of laser light on the retina using a tool called a slit lamp. Your eye care doctor will shine a laser in the exact spot in your eye. This will activate the light-sensitive medicine. It will form blood clots in the abnormal vessels beneath your macula. This seals off the abnormal blood vessels.  Risks of photodynamic therapy  All procedures have risks. The risks of this procedure include:  · Temporary loss of visual sharpness, which in rare cases can be severe  · A new blind spot  · Reactions where you had the light-activated medicine injected  · Back pain because of injection of the medicine  · Photosensitivity reactions like sunburn, if exposed to sunlight right after the procedure  · Only short-term relief from AMD symptoms, because the blood vessels open again  Your risks may differ according to your age, your general health, and the type of your AMD. Ask your healthcare provider which risks apply most to you.   Date Last Reviewed: 2/1/2017 © 2000-2017 [employer]. [address] [place] [address], [place] [address]. All rights reserved. This information is not intended as a substitute for professional medical care. Always follow your healthcare professional's instructions.

## 2019-04-04 NOTE — PROGRESS NOTES
Subjective:       Patient ID: Garry Rooney is a 78 y.o. male.    Chief Complaint: Neck and Facial swelling (with Left side facial swelling)    The patient was taking a zee to [place] when he developed sudden onset of swelling and pain in the left facial area and neck on March 13.  While on the ship he did receive 2 days of IV antibiotics.  He was also prescribed Omnicef and clindamycin.  When the chip returned port he saw his primary care physician, Dr. Hussein.  At that time there was minimal swelling and no further antibiotics for recommended.  Swelling completely resolved by March 28th.  He did well until yesterday.  He developed acute swelling and pain in the left cheek in jowl area that has persisted to the present.  Dr. Sifuentes will have started him on on Augmentin.  He ordered a CT scan and referred him here for evaluation.    Review of Systems   Constitutional: Positive for fatigue. Negative for activity change, appetite change, chills, fever and unexpected weight change.   HENT: Positive for congestion, ear pain, facial swelling, postnasal drip and sinus pressure. Negative for ear discharge, hearing loss, mouth sores, rhinorrhea, sinus pain, sneezing, sore throat, tinnitus, trouble swallowing and voice change.         Swelling in the left side of the neck   Eyes: Positive for visual disturbance. Negative for photophobia, discharge, redness and itching.   Respiratory: Positive for cough. Negative for apnea, choking, shortness of breath and wheezing.    Cardiovascular: Negative for chest pain and palpitations.   Gastrointestinal: Negative for abdominal distention, abdominal pain, nausea and vomiting.   Musculoskeletal: Positive for arthralgias, back pain, gait problem, myalgias, neck pain and neck stiffness.   Skin: Negative.  Negative for color change, pallor and rash.   Allergic/Immunologic: Positive for environmental allergies. Negative for food allergies and immunocompromised state.   Neurological: Positive  for dizziness, light-headedness and headaches. Negative for facial asymmetry, speech difficulty, weakness and numbness.   Hematological: Negative for adenopathy. Does not bruise/bleed easily.   Psychiatric/Behavioral: Negative for agitation, confusion, decreased concentration and sleep disturbance.       Objective:      Physical Exam   Constitutional: He is oriented to person, place, and time. He appears well-developed and well-nourished.   HENT:   Head: Normocephalic.   Right Ear: External ear and ear canal normal. Tympanic membrane is retracted. Tympanic membrane mobility is abnormal.   Left Ear: External ear and ear canal normal. Tympanic membrane is retracted. Tympanic membrane mobility is abnormal.   Nose: Mucosal edema (with inflamed turbinates bilaterall), rhinorrhea (yellow pus bilaterally) and septal deviation (High to the right and low in the nose to the left) present. No nasal deformity.   Mouth/Throat: Uvula is midline and mucous membranes are normal. He does not have dentures. No oral lesions ( clear secretions from Stensen's duct on compression of left parotid). Abnormal dentition ( edentulous). No oropharyngeal exudate (erythema) or posterior oropharyngeal erythema. No tonsillar exudate.   Face/neck-induration and mild tenderness of entire superficial lobe of the parotid gland, no fluctuance, swelling and tenderness in the superior cervical and jugulodigastric/anterior cervical lymph node groups, edema of the upper neck on the left side of the neck and submental regions   Eyes: Pupils are equal, round, and reactive to light. EOM and lids are normal. Right eye exhibits no discharge. Left eye exhibits no discharge. Right conjunctiva is injected. Left conjunctiva is injected.   Neck: Trachea normal, normal range of motion, full passive range of motion without pain and phonation normal. Neck supple. No neck rigidity. No thyroid mass and no thyromegaly present.   Cardiovascular: Normal rate, regular rhythm  and normal heart sounds.   Pulmonary/Chest: No respiratory distress. He has decreased breath sounds ( Diffusely). He has no wheezes. He has no rhonchi. He has no rales.   Abdominal: Soft. Bowel sounds are normal. There is no tenderness.   Musculoskeletal: Normal range of motion.        Right shoulder: Normal.   Lymphadenopathy:        Head (right side): No occipital adenopathy present.        Head (left side): No occipital adenopathy present.     He has cervical adenopathy.        Right cervical: Superficial cervical, deep cervical and posterior cervical adenopathy present.        Left cervical: Superficial cervical, deep cervical and posterior cervical adenopathy present.   Neurological: He is alert and oriented to person, place, and time. He has normal strength. No cranial nerve deficit or sensory deficit. Gait normal.   Skin: Skin is warm and dry. No lesion, no petechiae and no rash noted. No cyanosis. Nails show no clubbing.   Psychiatric: He has a normal mood and affect. His speech is normal and behavior is normal. Cognition and memory are normal.       CT of the neck-soft tissue swelling without definite abscess cavity in the left parotid gland and mass derma so, maxillary dental tooth root noted on CT scan with no evidence of superior crowns     Assessment:       1. Acute parotitis    2. Acute cervical adenitis    3. Nasal septal deviation        Plan:       I discussed the patient's findings with his primary care physician.  I will give him an injection of antibiotic and steroid today in  follow up with him tomorrow for repeat evaluation.

## 2019-04-04 NOTE — LETTER
April 4, 2019      Shiv Trejo MD  [address]  [address]  [facility] [address]           [facility] [place] [address]  [address]  [facility] [address]  Phone: [phone]  Fax: [phone]          Patient: Garry Rooney   MR Number: [id number]   YOB: 1940   Date of Visit: 4/4/2019       Dear Dr. Shiv Trejo:    Thank you for referring Garry Rooney to me for evaluation. Attached you will find relevant portions of my assessment and plan of care.    If you have questions, please do not hesitate to call me. I look forward to following Garry Rooney along with you.    Sincerely,    MARTHA Marti MD    Enclosure  CC:  No Recipients    If you would like to receive this communication electronically, please contact [email or web address] or [phone] to request more information on [employer] access.    For providers and/or their staff who would like to refer a patient to [facility], please contact us through our one-stop-shop provider referral line, [facility], at [phone].    If you feel you have received this communication in error or would no longer like to receive these types of communications, please e-mail [email or web address]

## 2019-04-04 NOTE — PROGRESS NOTES
"Subjective:       Patient ID: Garry Rooney is a 78 y.o. male.    Chief Complaint: Facial Swelling    Here for urgent visit    Updated HPI  Swelling, pain, and energy improved until last night he did not feel well in general and woke this morning with return of swelling of left face, swelling, and fatigue.     HPI 3/25/19  3/15/19 while on a cruise in [place] overnight developed sweling of left side of face. He was seen by MD and given Iv abx x2 days and discharged with cefdinir and clindamycin for 7 days, completed today. Swelling much improved but has some residual swelling and tenderness. He reports his left jaw is tight. He denies F/C, myalgias, malaise, sore throat, eye pain, ear pain, dry eyes, facial asymmetry, dry mouth. MD concerned for salivary abscess and recommended imaging. He and is wife decided against this. He is on Humira and azathiprine  for Crohn's      Review of Systems    Objective:      Vitals:    04/04/19 1351   BP: 123/70   Pulse: 90   SpO2: 95%   Weight: 66.8 kg (147 lb 4.3 oz)   Height: 5' 4" (1.626 m)      Physical Exam   Constitutional: He is oriented to person, place, and time. He appears well-developed and well-nourished. He does not have a sickly appearance. No distress.   HENT:   Head: Normocephalic and atraumatic.   Left sided facial swelling with associated ttp. No erythema of overlying skin. Salivary and parotid areas both tender to touch.   Eyes: Conjunctivae and EOM are normal. Right eye exhibits no discharge. Left eye exhibits no discharge. No scleral icterus.   Pulmonary/Chest: Effort normal. No respiratory distress.   Abdominal: Normal appearance. He exhibits no distension.   Neurological: He is alert and oriented to person, place, and time.   Skin: Skin is warm and dry. He is not diaphoretic.   Psychiatric: He has a normal mood and affect. His speech is normal.       Assessment:       1. Left facial swelling    2. Facial infection    3. Adalimumab (Humira) long-term use    4. " Malignant neoplasm of urinary bladder, unspecified site        Plan:       Garry was seen today for facial swelling.    Diagnoses and all orders for this visit:    Left facial swelling  Suspect underlying abscess as cause for return of symptoms. Start Augmentin. Dr Marti in ENT has wonderfully made himself available to see pt in event he needs surgical intervention.  -     CT Neck Chest With Contrast (XPD); Future  -     CT Maxillofacial With Contrast; Future    Facial infection                 Side effects of medication(s) were discussed in detail and patient voiced understanding.  Patient will call back for any issues or complications.

## 2019-04-05 ENCOUNTER — TELEPHONE (OUTPATIENT)
Dept: OTOLARYNGOLOGY | Facility: CLINIC | Age: 79
End: 2019-04-05

## 2019-04-05 ENCOUNTER — OFFICE VISIT (OUTPATIENT)
Dept: OTOLARYNGOLOGY | Facility: CLINIC | Age: 79
End: 2019-04-05
Payer: MEDICARE

## 2019-04-05 VITALS
BODY MASS INDEX: 24.98 KG/M2 | HEART RATE: 85 BPM | TEMPERATURE: 98 F | DIASTOLIC BLOOD PRESSURE: 67 MMHG | HEIGHT: 64 IN | SYSTOLIC BLOOD PRESSURE: 114 MMHG | WEIGHT: 146.31 LBS

## 2019-04-05 DIAGNOSIS — J30.9 ALLERGIC RHINITIS, UNSPECIFIED SEASONALITY, UNSPECIFIED TRIGGER: ICD-10-CM

## 2019-04-05 DIAGNOSIS — K11.21 ACUTE PAROTITIS: Primary | ICD-10-CM

## 2019-04-05 DIAGNOSIS — J34.2 NASAL SEPTAL DEVIATION: ICD-10-CM

## 2019-04-05 DIAGNOSIS — L04.0 ACUTE CERVICAL ADENITIS: ICD-10-CM

## 2019-04-05 PROCEDURE — 99214 PR OFFICE/OUTPT VISIT, EST, LEVL IV, 30-39 MIN: ICD-10-PCS | Mod: S$GLB,,, | Performed by: SPECIALIST

## 2019-04-05 PROCEDURE — 3078F PR MOST RECENT DIASTOLIC BLOOD PRESSURE < 80 MM HG: ICD-10-PCS | Mod: CPTII,S$GLB,, | Performed by: SPECIALIST

## 2019-04-05 PROCEDURE — 3074F SYST BP LT 130 MM HG: CPT | Mod: CPTII,S$GLB,, | Performed by: SPECIALIST

## 2019-04-05 PROCEDURE — 3078F DIAST BP <80 MM HG: CPT | Mod: CPTII,S$GLB,, | Performed by: SPECIALIST

## 2019-04-05 PROCEDURE — 1101F PT FALLS ASSESS-DOCD LE1/YR: CPT | Mod: CPTII,S$GLB,, | Performed by: SPECIALIST

## 2019-04-05 PROCEDURE — 99214 OFFICE O/P EST MOD 30 MIN: CPT | Mod: S$GLB,,, | Performed by: SPECIALIST

## 2019-04-05 PROCEDURE — 3074F PR MOST RECENT SYSTOLIC BLOOD PRESSURE < 130 MM HG: ICD-10-PCS | Mod: CPTII,S$GLB,, | Performed by: SPECIALIST

## 2019-04-05 PROCEDURE — 1101F PR PT FALLS ASSESS DOC 0-1 FALLS W/OUT INJ PAST YR: ICD-10-PCS | Mod: CPTII,S$GLB,, | Performed by: SPECIALIST

## 2019-04-05 NOTE — TELEPHONE ENCOUNTER
Called pt and spoke with him per Dr. Marti saw him on yesterday for facial swelling and pt will follow up today with Dr. Marti.

## 2019-04-05 NOTE — TELEPHONE ENCOUNTER
----- Message from Jen Encinas sent at 4/5/2019  8:00 AM CDT -----  Contact: DALE ARTHUR [[id number]]  Name of Who is Calling: DALE ARTHUR [[id number]]    What is the request in detail: Patient call regarding an appointment for today       Can the clinic reply by [facility]: no      What Number to Call Back if not in [facility]: [phone]

## 2019-04-05 NOTE — PROGRESS NOTES
Subjective:       Patient ID: Garry Rooney is a 78 y.o. male.    Chief Complaint: Follow-up    The patient returns today for a follow-up visit.  In the 24 hr since receiving the Rocephin and steroid injections and starting on Augmentin the swelling in the left parotid area has diminished markedly.  He still has some tenderness at the angle of mandible and in the upper neck.  He is not having fever or chills.  He does not have a bad taste in his mouth.    Review of Systems   Constitutional: Positive for fatigue. Negative for activity change, appetite change, chills, fever and unexpected weight change.   HENT: Positive for congestion, facial swelling, postnasal drip and sore throat. Negative for drooling, ear discharge, ear pain, hearing loss, mouth sores, rhinorrhea, sinus pressure, sinus pain, sneezing, tinnitus, trouble swallowing and voice change.    Eyes: Negative for photophobia, pain, discharge, redness, itching and visual disturbance.   Respiratory: Positive for cough and shortness of breath. Negative for apnea, choking and wheezing.         Uses supplemental oxygen   Cardiovascular: Negative for chest pain and palpitations.   Gastrointestinal: Negative for abdominal distention, abdominal pain, nausea and vomiting.   Musculoskeletal: Negative for arthralgias, myalgias, neck pain and neck stiffness.   Skin: Negative.  Negative for color change, pallor and rash.   Allergic/Immunologic: Negative for environmental allergies, food allergies and immunocompromised state.   Neurological: Positive for headaches. Negative for dizziness, seizures, facial asymmetry, speech difficulty, weakness, light-headedness and numbness.   Hematological: Positive for adenopathy. Does not bruise/bleed easily.   Psychiatric/Behavioral: Negative for agitation, confusion, decreased concentration and sleep disturbance.       Objective:      Physical Exam   Constitutional: He is oriented to person, place, and time. He appears  well-developed and well-nourished. He is cooperative.   HENT:   Head: Normocephalic.   Right Ear: External ear and ear canal normal. Tympanic membrane is retracted.   Left Ear: External ear and ear canal normal. Tympanic membrane is retracted.   Nose: Mucosal edema (cyanotic, boggy inferior turbinates bilaterally), rhinorrhea (clear mucus bilaterally) and septal deviation (High to the right and the to the left low in the nose) present.   Mouth/Throat: Uvula is midline, oropharynx is clear and moist and mucous membranes are normal. No oral lesions ( clear secretions from Stensen's ducts).   Face/neck-edema of parotid and masseter muscle decreased by 75%, entire superficial lobe of left parotid firm and tender to palpation, edema in the left neck area has diminished by approximately 75%   Eyes: Pupils are equal, round, and reactive to light. EOM and lids are normal. Right eye exhibits no discharge and no exudate. Left eye exhibits no discharge and no exudate. Right conjunctiva is injected. Left conjunctiva is injected.   Neck: Trachea normal and normal range of motion. No muscular tenderness present. No tracheal deviation present. No thyroid mass and no thyromegaly present.   Cardiovascular: Normal rate, regular rhythm, normal heart sounds and normal pulses.   Pulmonary/Chest: Effort normal and breath sounds normal. No stridor. He has no decreased breath sounds. He has no wheezes. He has no rhonchi. He has no rales.   Abdominal: Soft. Bowel sounds are normal. There is no tenderness.   Musculoskeletal: Normal range of motion.   Lymphadenopathy:        Head (right side): No submental, no submandibular, no preauricular, no posterior auricular and no occipital adenopathy present.        Head (left side): Submandibular and preauricular adenopathy present. No submental, no posterior auricular and no occipital adenopathy present.     He has cervical adenopathy.        Left cervical: Superficial cervical, deep cervical and  posterior cervical adenopathy present.   Lymphatic-left cervical adenitis diminished by 50% in size and 75% and tenderness   Neurological: He is alert and oriented to person, place, and time. He has normal strength. No cranial nerve deficit or sensory deficit. Gait normal.   Skin: Skin is warm and dry. No petechiae and no rash noted. No cyanosis. Nails show no clubbing.   Psychiatric: He has a normal mood and affect. His speech is normal and behavior is normal. Judgment and thought content normal. Cognition and memory are normal.       Assessment:       1. Acute parotitis    2. Acute cervical adenitis    3. Allergic rhinitis, unspecified seasonality, unspecified trigger    4. Nasal septal deviation        Plan:       The patient is showing an excellent response to medical therapy.  He will continue with the Augmentin.  He has to drink  8-10 glasses water per day and use sialagogues every 1-2 hours..  I have advised placement of heating pad to the area for 30 min 4 times daily followed by massage.  I will recheck him at the end of his antibiotics in 2 weeks.

## 2019-04-09 ENCOUNTER — TELEPHONE (OUTPATIENT)
Dept: PAIN MEDICINE | Facility: CLINIC | Age: 79
End: 2019-04-09

## 2019-04-09 ENCOUNTER — TELEPHONE (OUTPATIENT)
Dept: INTERNAL MEDICINE | Facility: CLINIC | Age: 79
End: 2019-04-09

## 2019-04-09 NOTE — TELEPHONE ENCOUNTER
----- Message from Marian Espinal sent at 4/9/2019  8:16 AM CDT -----  Contact: torrie  Name of Who is Calling: torrie pt wife      What is the request in detail: Patient is requesting a call back concerning back pain and recommendation for a orthopedic       Can the clinic reply by [facility]: no      What Number to Call Back if not in [facility]: [phone]

## 2019-04-09 NOTE — TELEPHONE ENCOUNTER
Spoke with patients wife she wanted a referral for herself but is not an established patient she understood she would need to establish care first

## 2019-04-09 NOTE — TELEPHONE ENCOUNTER
Contacted and spoke to patient wife Mrs. Rooney, she reports she woke up with severe back and knee pain. She reports this is not for Mr. Rooney but herself, she knows she may require a knee replacement and is trying to get all of her care moved to [facility], so, she would like to know what orthopedist do you recommend since she holds your opinion in such high regards, she would trust your judgement?

## 2019-04-09 NOTE — TELEPHONE ENCOUNTER
Preferred Name:   Garry Rooney  Male, 78 y.o., 1940  Phone:   [phone] (M)  PCP:   Shiv Trejo MD  Language:   English  Need Interp:   No  Allergies Last Reviewed:   04/05/19  Allergies:   Fosamax [Alendronate]  ,   Reclast [Zoledronic Acid-mannitol-water]  ,   Sesame Seed  ,   Sulfa (Sulfonamide Antibiotics)  Health Maintenance:   Due  Primary Ins.:   [employer] MEDICARE  MRN:   [id number]  Pt Comm Pref:   Patient Portal, Mail  Next Appt:   With R Lance Marti MD  04/22/2019 at 4:15 PM  My Sticky Note:     Specialty Comments:     Message   Received: Today   Message Contents   MD Yaritza Saucedo MA   Caller: Unspecified (Today,  8:33 AM)             Dr. Frederic Stout    Previous Messages            Patient Calls     Cliff Carrillo MD  You 1 hour ago (3:32 PM)      Dr. Frederic Stout    Routing comment       You routed conversation to Cliff Carrillo MD 7 hours ago (8:36 AM)      You 8 hours ago (8:33 AM)         Contacted and spoke to patient wife Mrs. Rooney, she reports she woke up with severe back and knee pain. She reports this is not for Mr. Rooney but herself, she knows she may require a knee replacement and is trying to get all of her care moved to [facility], so, she would like to know what orthopedist do you recommend since she holds your opinion in such high regards, she would trust your judgement?         Documentation       You  Anne Rooney 8 hours ago (8:33 AM)      You 8 hours ago (8:32 AM)         ----- Message from Marian Espinal sent at 4/9/2019  8:16 AM CDT -----  Contact: anne  Name of Who is Calling: anne pt wife        What is the request in detail: Patient is requesting a call back concerning back pain and recommendation for a orthopedic         Can the clinic reply by [facility]: no        What Number to Call Back [facility] not [facility]: [phone]                                                                Documentation             [facility] and provided Fanny Nevin with the information.

## 2019-04-17 ENCOUNTER — TELEPHONE (OUTPATIENT)
Dept: PHARMACY | Facility: CLINIC | Age: 79
End: 2019-04-17

## 2019-04-23 ENCOUNTER — OFFICE VISIT (OUTPATIENT)
Dept: OTOLARYNGOLOGY | Facility: CLINIC | Age: 79
End: 2019-04-23
Payer: MEDICARE

## 2019-04-23 VITALS
BODY MASS INDEX: 25.38 KG/M2 | TEMPERATURE: 98 F | HEIGHT: 64 IN | DIASTOLIC BLOOD PRESSURE: 72 MMHG | WEIGHT: 148.63 LBS | SYSTOLIC BLOOD PRESSURE: 122 MMHG | HEART RATE: 82 BPM

## 2019-04-23 DIAGNOSIS — K11.21 ACUTE PAROTITIS: Primary | ICD-10-CM

## 2019-04-23 DIAGNOSIS — J30.9 ALLERGIC RHINITIS, UNSPECIFIED SEASONALITY, UNSPECIFIED TRIGGER: ICD-10-CM

## 2019-04-23 DIAGNOSIS — J34.2 NASAL SEPTAL DEVIATION: ICD-10-CM

## 2019-04-23 DIAGNOSIS — L04.0 ACUTE CERVICAL ADENITIS: ICD-10-CM

## 2019-04-23 PROCEDURE — 99214 PR OFFICE/OUTPT VISIT, EST, LEVL IV, 30-39 MIN: ICD-10-PCS | Mod: S$GLB,,, | Performed by: SPECIALIST

## 2019-04-23 PROCEDURE — 3074F PR MOST RECENT SYSTOLIC BLOOD PRESSURE < 130 MM HG: ICD-10-PCS | Mod: CPTII,S$GLB,, | Performed by: SPECIALIST

## 2019-04-23 PROCEDURE — 3078F PR MOST RECENT DIASTOLIC BLOOD PRESSURE < 80 MM HG: ICD-10-PCS | Mod: CPTII,S$GLB,, | Performed by: SPECIALIST

## 2019-04-23 PROCEDURE — 1101F PR PT FALLS ASSESS DOC 0-1 FALLS W/OUT INJ PAST YR: ICD-10-PCS | Mod: CPTII,S$GLB,, | Performed by: SPECIALIST

## 2019-04-23 PROCEDURE — 1101F PT FALLS ASSESS-DOCD LE1/YR: CPT | Mod: CPTII,S$GLB,, | Performed by: SPECIALIST

## 2019-04-23 PROCEDURE — 99214 OFFICE O/P EST MOD 30 MIN: CPT | Mod: S$GLB,,, | Performed by: SPECIALIST

## 2019-04-23 PROCEDURE — 3074F SYST BP LT 130 MM HG: CPT | Mod: CPTII,S$GLB,, | Performed by: SPECIALIST

## 2019-04-23 PROCEDURE — 3078F DIAST BP <80 MM HG: CPT | Mod: CPTII,S$GLB,, | Performed by: SPECIALIST

## 2019-04-23 NOTE — PROGRESS NOTES
Subjective:       Patient ID: Garry Rooney is a 78 y.o. male.    Chief Complaint: Follow-up    The patient is coming in today for follow-up evaluation.  He has no further swelling or pain in the cheek/jaw area on the left.  There is no more swelling in the soft tissues.  He does not have fever he continues to use 8 thing glasses water per day for hydration.  He is upset today because his wife is currently admitted to ICU/CCU for cardiac arrhythmia.    Review of Systems   Constitutional: Positive for fatigue. Negative for activity change, appetite change, chills, fever and unexpected weight change.   HENT: Positive for congestion, ear pain, facial swelling, hearing loss, mouth sores, postnasal drip, sinus pressure, sore throat and trouble swallowing. Negative for ear discharge, nosebleeds, rhinorrhea, sinus pain, sneezing, tinnitus and voice change.    Eyes: Negative for photophobia, pain, discharge, redness, itching and visual disturbance.   Respiratory: Positive for cough, shortness of breath (Uses supplemental oxygen) and wheezing. Negative for apnea and choking.    Cardiovascular: Negative for chest pain and palpitations.   Gastrointestinal: Positive for abdominal distention and abdominal pain. Negative for nausea and vomiting.   Musculoskeletal: Negative for arthralgias, myalgias, neck pain and neck stiffness.   Skin: Negative.  Negative for color change, pallor and rash.   Allergic/Immunologic: Negative for environmental allergies, food allergies and immunocompromised state.   Neurological: Positive for light-headedness and headaches. Negative for dizziness, facial asymmetry, speech difficulty, weakness and numbness.   Hematological: Negative for adenopathy. Does not bruise/bleed easily.   Psychiatric/Behavioral: Negative for agitation, confusion, decreased concentration and sleep disturbance.       Objective:      Physical Exam   Constitutional: He is oriented to person, place, and time. He appears  well-developed and well-nourished. He is cooperative.   HENT:   Head: Normocephalic.   Right Ear: External ear and ear canal normal. Tympanic membrane is retracted.   Left Ear: External ear and ear canal normal. Tympanic membrane is retracted.   Nose: Mucosal edema (cyanotic, boggy inferior turbinates bilaterally), rhinorrhea (clear mucus bilaterally) and septal deviation (High to the right and low to the left) present.   Mouth/Throat: Uvula is midline, oropharynx is clear and moist and mucous membranes are normal. No oral lesions ( clear secretions from Stensen's ducts bilaterally).   Neck-no swelling of parotid gland, soft tissue edema has resolved, minimal tenderness in the left upper neck adjacent to the tail of the parotid   Eyes: Pupils are equal, round, and reactive to light. EOM and lids are normal. Right eye exhibits no discharge and no exudate. Left eye exhibits no discharge and no exudate. Right conjunctiva is injected. Left conjunctiva is injected.   Neck: Trachea normal and normal range of motion. No muscular tenderness present. No tracheal deviation present. No thyroid mass and no thyromegaly present.   Cardiovascular: Normal rate, regular rhythm, normal heart sounds and normal pulses.   Pulmonary/Chest: Effort normal and breath sounds normal. No stridor. He has no decreased breath sounds. He has no wheezes. He has no rhonchi. He has no rales.   Abdominal: Soft. Bowel sounds are normal. There is no tenderness.   Musculoskeletal: Normal range of motion.   Lymphadenopathy:        Head (right side): No submental, no submandibular, no preauricular, no posterior auricular and no occipital adenopathy present.        Head (left side): No submental, no submandibular, no preauricular, no posterior auricular and no occipital adenopathy present.     He has cervical adenopathy.        Left cervical: Deep cervical adenopathy present.   Neurological: He is alert and oriented to person, place, and time. He has normal  strength. No cranial nerve deficit or sensory deficit. Gait normal.   Skin: Skin is warm and dry. No petechiae and no rash noted. No cyanosis. Nails show no clubbing.   Psychiatric: He has a normal mood and affect. His speech is normal and behavior is normal. Judgment and thought content normal. Cognition and memory are normal.       Assessment:       1. Acute parotitis    2. Allergic rhinitis, unspecified seasonality, unspecified trigger    3. Nasal septal deviation    4. Acute cervical adenitis        Plan:       I am encouraging the patient continue hydrating well with 8-10 glasses water per day.  If there is any swelling in his parotid gland can apply heat and massage.  Has antibiotics to restart if it becomes tender or if he develops lymph nodes are swelling in his neck.  I will recheck him on an as-needed basis.  If he needs to the starting antibiotics for swelling of the gland he should come in within a week.

## 2019-04-25 DIAGNOSIS — K50.80 CROHN'S DISEASE OF BOTH SMALL AND LARGE INTESTINE WITHOUT COMPLICATION: ICD-10-CM

## 2019-04-27 RX ORDER — MESALAMINE 800 MG/1
TABLET, DELAYED RELEASE ORAL
Qty: 180 TABLET | Refills: 0 | Status: SHIPPED | OUTPATIENT
Start: 2019-04-27 | End: 2019-06-25 | Stop reason: SDUPTHER

## 2019-05-13 ENCOUNTER — TELEPHONE (OUTPATIENT)
Dept: PHARMACY | Facility: CLINIC | Age: 79
End: 2019-05-13

## 2019-05-13 NOTE — TELEPHONE ENCOUNTER
Pt confirmed shipping of Humira pen on  for delivery on  via [employer].  Pt address and  verified.  $55.00 copay in 004.  [facility].  Pt will inject on .  Offered to require signature due to past delivery problems.  Pt's caretaker declined signature required.  Mrs. Rooney assured me that someone will be available on  to receive the package.  Pt had no further questions or concerns.

## 2019-06-03 ENCOUNTER — TELEPHONE (OUTPATIENT)
Dept: PHARMACY | Facility: CLINIC | Age: 79
End: 2019-06-03

## 2019-06-03 DIAGNOSIS — K50.80 CROHN'S DISEASE OF BOTH SMALL AND LARGE INTESTINE WITHOUT COMPLICATION: Primary | ICD-10-CM

## 2019-06-03 NOTE — TELEPHONE ENCOUNTER
"Patient's wife notified that refill prescription received and copay is $55.00. She requests shipment to arrive 6/5 so will ship 6/4 to arrive 6/5. $55.00 copay in 004 with [facility]. She states she will sit in her walkway if she has to to wait for the delivery because their callbox is broken. She asked again if we could use [employer] instead of [employer] and I explained that we are not contracted with them. She states he has 1 dose remaining on hand but has "screwed it all up" and has to check her calendar to see when she will be giving it. No changes to report and no questions or concerns at this time.  "

## 2019-06-25 ENCOUNTER — TELEPHONE (OUTPATIENT)
Dept: GASTROENTEROLOGY | Facility: CLINIC | Age: 79
End: 2019-06-25

## 2019-06-25 DIAGNOSIS — K50.80 CROHN'S DISEASE OF BOTH SMALL AND LARGE INTESTINE WITHOUT COMPLICATION: ICD-10-CM

## 2019-06-25 RX ORDER — MESALAMINE 800 MG/1
800 TABLET, DELAYED RELEASE ORAL 3 TIMES DAILY
Qty: 180 TABLET | Refills: 3 | Status: SHIPPED | OUTPATIENT
Start: 2019-06-25 | End: 2019-08-12 | Stop reason: SDUPTHER

## 2019-06-25 NOTE — TELEPHONE ENCOUNTER
----- Message from Cee Nichols MA sent at 6/25/2019  1:49 PM CDT -----  Contact: Anne pt wife#[phone]      ----- Message -----  From: Mariel Christy  Sent: 6/25/2019  12:57 PM  To: Ziyad WATKINS Staff    Rx Refill/Request     Is this a Refill or New Rx:  Refill  Rx Name and Strength:  [facility]  Preferred Pharmacy with phone number: [facility] #[phone]  Communication Preference:call  Additional Information: pt wants a 90 day supply with 3 refills

## 2019-07-02 ENCOUNTER — TELEPHONE (OUTPATIENT)
Dept: PHARMACY | Facility: CLINIC | Age: 79
End: 2019-07-02

## 2019-08-01 ENCOUNTER — TELEPHONE (OUTPATIENT)
Dept: PHARMACY | Facility: CLINIC | Age: 79
End: 2019-08-01

## 2019-08-01 NOTE — TELEPHONE ENCOUNTER
Left VM 8/1 for pt's wife Anne in regards to Humira Rx. Calling to ask if pt would like to fill with [facility] this month. Rx had been transferred to Pharmacy in [place] in [facility] last month. But wanted to confirm whether or not pt will be back in [place] and wishing to will with [facility].

## 2019-08-05 DIAGNOSIS — K50.80 CROHN'S DISEASE OF BOTH SMALL AND LARGE INTESTINE WITHOUT COMPLICATION: ICD-10-CM

## 2019-08-07 NOTE — TELEPHONE ENCOUNTER
Spoke with patients wife Ms. Rooney regarding need for Humira refill. Rx scheduled for  8/8, they do not wish to use [employer] again, will have someone  for him.  She denies any missed doses or side effects, next due Sunday 8/11 and 0 doses on hand. No changes to other medications or conditions. No other questions or concerns today.

## 2019-08-12 DIAGNOSIS — K50.80 CROHN'S DISEASE OF BOTH SMALL AND LARGE INTESTINE WITHOUT COMPLICATION: ICD-10-CM

## 2019-08-12 RX ORDER — MESALAMINE 800 MG/1
800 TABLET, DELAYED RELEASE ORAL 3 TIMES DAILY
Qty: 270 TABLET | Refills: 3 | Status: SHIPPED | OUTPATIENT
Start: 2019-08-12 | End: 2020-02-10 | Stop reason: SDUPTHER

## 2019-08-12 RX ORDER — CIPROFLOXACIN 500 MG/1
500 TABLET ORAL 2 TIMES DAILY
Qty: 180 TABLET | Refills: 1 | Status: SHIPPED | OUTPATIENT
Start: 2019-08-12 | End: 2020-02-10 | Stop reason: SDUPTHER

## 2019-08-27 ENCOUNTER — OFFICE VISIT (OUTPATIENT)
Dept: PAIN MEDICINE | Facility: CLINIC | Age: 79
End: 2019-08-27
Payer: MEDICARE

## 2019-08-27 VITALS
HEIGHT: 64 IN | BODY MASS INDEX: 25.95 KG/M2 | WEIGHT: 152 LBS | SYSTOLIC BLOOD PRESSURE: 112 MMHG | RESPIRATION RATE: 18 BRPM | HEART RATE: 75 BPM | TEMPERATURE: 99 F | DIASTOLIC BLOOD PRESSURE: 63 MMHG

## 2019-08-27 DIAGNOSIS — M47.816 LUMBAR SPONDYLOSIS: ICD-10-CM

## 2019-08-27 DIAGNOSIS — J98.6 DIAPHRAGMATIC DISORDER: ICD-10-CM

## 2019-08-27 DIAGNOSIS — T38.0X5A STEROID-INDUCED OSTEOPOROSIS: ICD-10-CM

## 2019-08-27 DIAGNOSIS — M81.8 STEROID-INDUCED OSTEOPOROSIS: ICD-10-CM

## 2019-08-27 DIAGNOSIS — R53.81 PHYSICAL DECONDITIONING: ICD-10-CM

## 2019-08-27 DIAGNOSIS — S22.000A COMPRESSION FRACTURE OF BODY OF THORACIC VERTEBRA: ICD-10-CM

## 2019-08-27 DIAGNOSIS — K50.80 CROHN'S DISEASE OF BOTH SMALL AND LARGE INTESTINE WITHOUT COMPLICATION: ICD-10-CM

## 2019-08-27 DIAGNOSIS — M51.36 DDD (DEGENERATIVE DISC DISEASE), LUMBAR: ICD-10-CM

## 2019-08-27 DIAGNOSIS — G89.4 CHRONIC PAIN SYNDROME: Primary | ICD-10-CM

## 2019-08-27 PROCEDURE — 1101F PR PT FALLS ASSESS DOC 0-1 FALLS W/OUT INJ PAST YR: ICD-10-PCS | Mod: CPTII,S$GLB,, | Performed by: NURSE PRACTITIONER

## 2019-08-27 PROCEDURE — 1101F PT FALLS ASSESS-DOCD LE1/YR: CPT | Mod: CPTII,S$GLB,, | Performed by: NURSE PRACTITIONER

## 2019-08-27 PROCEDURE — 99999 PR PBB SHADOW E&M-EST. PATIENT-LVL III: CPT | Mod: PBBFAC,,, | Performed by: NURSE PRACTITIONER

## 2019-08-27 PROCEDURE — 3074F SYST BP LT 130 MM HG: CPT | Mod: CPTII,S$GLB,, | Performed by: NURSE PRACTITIONER

## 2019-08-27 PROCEDURE — 3078F DIAST BP <80 MM HG: CPT | Mod: CPTII,S$GLB,, | Performed by: NURSE PRACTITIONER

## 2019-08-27 PROCEDURE — 3074F PR MOST RECENT SYSTOLIC BLOOD PRESSURE < 130 MM HG: ICD-10-PCS | Mod: CPTII,S$GLB,, | Performed by: NURSE PRACTITIONER

## 2019-08-27 PROCEDURE — 99213 OFFICE O/P EST LOW 20 MIN: CPT | Mod: S$GLB,,, | Performed by: NURSE PRACTITIONER

## 2019-08-27 PROCEDURE — 99213 PR OFFICE/OUTPT VISIT, EST, LEVL III, 20-29 MIN: ICD-10-PCS | Mod: S$GLB,,, | Performed by: NURSE PRACTITIONER

## 2019-08-27 PROCEDURE — 3078F PR MOST RECENT DIASTOLIC BLOOD PRESSURE < 80 MM HG: ICD-10-PCS | Mod: CPTII,S$GLB,, | Performed by: NURSE PRACTITIONER

## 2019-08-27 PROCEDURE — 99999 PR PBB SHADOW E&M-EST. PATIENT-LVL III: ICD-10-PCS | Mod: PBBFAC,,, | Performed by: NURSE PRACTITIONER

## 2019-08-27 RX ORDER — OXYCODONE AND ACETAMINOPHEN 10; 325 MG/1; MG/1
1 TABLET ORAL 3 TIMES DAILY PRN
Qty: 90 TABLET | Refills: 0 | Status: SHIPPED | OUTPATIENT
Start: 2019-09-26 | End: 2019-10-26

## 2019-08-27 RX ORDER — OXYCODONE AND ACETAMINOPHEN 10; 325 MG/1; MG/1
1 TABLET ORAL 3 TIMES DAILY PRN
COMMUNITY
End: 2019-08-27 | Stop reason: SDUPTHER

## 2019-08-27 RX ORDER — OXYCODONE AND ACETAMINOPHEN 10; 325 MG/1; MG/1
1 TABLET ORAL 3 TIMES DAILY PRN
Qty: 90 TABLET | Refills: 0 | Status: SHIPPED | OUTPATIENT
Start: 2019-08-27 | End: 2019-09-26

## 2019-08-27 RX ORDER — OXYCODONE AND ACETAMINOPHEN 10; 325 MG/1; MG/1
1 TABLET ORAL 3 TIMES DAILY PRN
Qty: 90 TABLET | Refills: 0 | Status: SHIPPED | OUTPATIENT
Start: 2019-10-25 | End: 2019-11-24
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
## 2019-08-28 ENCOUNTER — TELEPHONE (OUTPATIENT)
Dept: PHARMACY | Facility: CLINIC | Age: 79
End: 2019-08-28

## 2019-09-23 ENCOUNTER — TELEPHONE (OUTPATIENT)
Dept: PHARMACY | Facility: CLINIC | Age: 79
End: 2019-09-23

## 2019-09-23 NOTE — TELEPHONE ENCOUNTER
refill call attempt for humira, copay 55.00. verified 2 pt. identifier, with wife Ms Rodríguez. She states that he has 1 dose(s) on hand for 9/29. Ms rodríguez states that they are still in the [place], and need his Rx transfer to [facility]. Ms rodríguez reports that they will be there for 5 more weeks. pt reports no missed doses, no new medications or allergies and no questions for the pharmacist at this time

## 2019-10-22 ENCOUNTER — TELEPHONE (OUTPATIENT)
Dept: PHARMACY | Facility: CLINIC | Age: 79
End: 2019-10-22

## 2019-12-09 ENCOUNTER — TELEPHONE (OUTPATIENT)
Dept: PHARMACY | Facility: CLINIC | Age: 79
End: 2019-12-09

## 2019-12-09 DIAGNOSIS — K50.819 CROHN'S DISEASE OF BOTH SMALL AND LARGE INTESTINE WITH COMPLICATION: ICD-10-CM

## 2019-12-09 DIAGNOSIS — Z79.899 ENCOUNTER FOR LONG-TERM (CURRENT) USE OF HIGH-RISK MEDICATION: ICD-10-CM

## 2019-12-09 RX ORDER — HYOSCYAMINE SULFATE 0.38 MG/1
TABLET, EXTENDED RELEASE ORAL
Qty: 180 TABLET | Refills: 0 | Status: SHIPPED | OUTPATIENT
Start: 2019-12-09 | End: 2020-02-10 | Stop reason: SDUPTHER

## 2019-12-09 NOTE — TELEPHONE ENCOUNTER
Humira refill and reassessment complete. Confirmed 2 patient identifiers - name and . Therapy appropriate.     Spoke with patient's wife Ms Rodríguez, who consented to a refill of Humira. Wife reports Mr Castañeda is doing very well with on his Humira. Patient administers 40 mg every 7 days for crohn's and reports not having a flare in 5 months. Wife reports patient is not having any side effects so far. No missed doses, no new medications, no new allergies or health conditions reported at this time. Reviewed proper medication administration, storage, dosage, and side effects.  Allergies reviewed and medication reconciliation complete. Patient counseled on importance of maintaining adherence. Ms Rodríguez reports they will have new insurance at the beginning of the year, informed wife to please call [facility] when new insurance information is received so that we can begin the process of any medication approvals that need to take place, wife verbalized understanding.  All questions answered and addressed to patients satisfaction. Advised to call [facility] and provider if any issues arise.

## 2019-12-13 ENCOUNTER — PATIENT OUTREACH (OUTPATIENT)
Dept: ADMINISTRATIVE | Facility: OTHER | Age: 79
End: 2019-12-13

## 2019-12-18 ENCOUNTER — OFFICE VISIT (OUTPATIENT)
Dept: PAIN MEDICINE | Facility: CLINIC | Age: 79
End: 2019-12-18
Attending: ANESTHESIOLOGY
Payer: MEDICARE

## 2019-12-18 VITALS
DIASTOLIC BLOOD PRESSURE: 88 MMHG | SYSTOLIC BLOOD PRESSURE: 134 MMHG | TEMPERATURE: 98 F | WEIGHT: 158.31 LBS | HEART RATE: 80 BPM | OXYGEN SATURATION: 100 % | HEIGHT: 64 IN | RESPIRATION RATE: 18 BRPM | BODY MASS INDEX: 27.03 KG/M2

## 2019-12-18 DIAGNOSIS — R53.81 PHYSICAL DECONDITIONING: ICD-10-CM

## 2019-12-18 DIAGNOSIS — F11.20 UNCOMPLICATED OPIOID DEPENDENCE: ICD-10-CM

## 2019-12-18 DIAGNOSIS — M79.10 MYALGIA: ICD-10-CM

## 2019-12-18 DIAGNOSIS — M47.899 FACET SYNDROME: ICD-10-CM

## 2019-12-18 DIAGNOSIS — G89.4 CHRONIC PAIN SYNDROME: Primary | ICD-10-CM

## 2019-12-18 PROCEDURE — 1159F PR MEDICATION LIST DOCUMENTED IN MEDICAL RECORD: ICD-10-PCS | Mod: S$GLB,,, | Performed by: ANESTHESIOLOGY

## 2019-12-18 PROCEDURE — 1125F AMNT PAIN NOTED PAIN PRSNT: CPT | Mod: S$GLB,,, | Performed by: ANESTHESIOLOGY

## 2019-12-18 PROCEDURE — 99214 OFFICE O/P EST MOD 30 MIN: CPT | Mod: S$GLB,,, | Performed by: ANESTHESIOLOGY

## 2019-12-18 PROCEDURE — 1101F PT FALLS ASSESS-DOCD LE1/YR: CPT | Mod: CPTII,S$GLB,, | Performed by: ANESTHESIOLOGY

## 2019-12-18 PROCEDURE — 99999 PR PBB SHADOW E&M-EST. PATIENT-LVL IV: CPT | Mod: PBBFAC,,, | Performed by: ANESTHESIOLOGY

## 2019-12-18 PROCEDURE — 99999 PR PBB SHADOW E&M-EST. PATIENT-LVL IV: ICD-10-PCS | Mod: PBBFAC,,, | Performed by: ANESTHESIOLOGY

## 2019-12-18 PROCEDURE — 3079F PR MOST RECENT DIASTOLIC BLOOD PRESSURE 80-89 MM HG: ICD-10-PCS | Mod: CPTII,S$GLB,, | Performed by: ANESTHESIOLOGY

## 2019-12-18 PROCEDURE — 3079F DIAST BP 80-89 MM HG: CPT | Mod: CPTII,S$GLB,, | Performed by: ANESTHESIOLOGY

## 2019-12-18 PROCEDURE — 1125F PR PAIN SEVERITY QUANTIFIED, PAIN PRESENT: ICD-10-PCS | Mod: S$GLB,,, | Performed by: ANESTHESIOLOGY

## 2019-12-18 PROCEDURE — 3075F PR MOST RECENT SYSTOLIC BLOOD PRESS GE 130-139MM HG: ICD-10-PCS | Mod: CPTII,S$GLB,, | Performed by: ANESTHESIOLOGY

## 2019-12-18 PROCEDURE — 99214 PR OFFICE/OUTPT VISIT, EST, LEVL IV, 30-39 MIN: ICD-10-PCS | Mod: S$GLB,,, | Performed by: ANESTHESIOLOGY

## 2019-12-18 PROCEDURE — 1101F PR PT FALLS ASSESS DOC 0-1 FALLS W/OUT INJ PAST YR: ICD-10-PCS | Mod: CPTII,S$GLB,, | Performed by: ANESTHESIOLOGY

## 2019-12-18 PROCEDURE — 1159F MED LIST DOCD IN RCRD: CPT | Mod: S$GLB,,, | Performed by: ANESTHESIOLOGY

## 2019-12-18 PROCEDURE — 3075F SYST BP GE 130 - 139MM HG: CPT | Mod: CPTII,S$GLB,, | Performed by: ANESTHESIOLOGY

## 2019-12-18 RX ORDER — OXYCODONE AND ACETAMINOPHEN 10; 325 MG/1; MG/1
1 TABLET ORAL 3 TIMES DAILY PRN
Qty: 90 TABLET | Refills: 0 | Status: SHIPPED | OUTPATIENT
Start: 2020-02-17 | End: 2020-03-17

## 2019-12-18 RX ORDER — OXYCODONE AND ACETAMINOPHEN 10; 325 MG/1; MG/1
1 TABLET ORAL 3 TIMES DAILY PRN
Qty: 90 TABLET | Refills: 0 | Status: SHIPPED | OUTPATIENT
Start: 2020-01-18 | End: 2020-02-16

## 2019-12-18 RX ORDER — OXYCODONE AND ACETAMINOPHEN 10; 325 MG/1; MG/1
1 TABLET ORAL 3 TIMES DAILY PRN
Qty: 90 TABLET | Refills: 0 | Status: SHIPPED | OUTPATIENT
Start: 2019-12-18 | End: 2020-01-17
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
## 2019-12-20 ENCOUNTER — TELEPHONE (OUTPATIENT)
Dept: GASTROENTEROLOGY | Facility: CLINIC | Age: 79
End: 2019-12-20

## 2019-12-20 DIAGNOSIS — K50.80 CROHN'S DISEASE OF BOTH SMALL AND LARGE INTESTINE WITHOUT COMPLICATION: Primary | ICD-10-CM

## 2019-12-20 NOTE — TELEPHONE ENCOUNTER
----- Message from Yamila Da Silva sent at 12/20/2019  9:42 AM CST -----  Contact: pt wife  Pt wife called stated she went to the pharmacy to get a refill of azathioprine (IMURAN) 50 mg Tab and was told it was disconnect by a physician.  She want to know if you can re-order this? Call back # [phone]    [facility] - [place], [place] - [address]  [address] [address]  Phone: [phone] Fax: [phone]

## 2019-12-26 ENCOUNTER — TELEPHONE (OUTPATIENT)
Dept: PHARMACY | Facility: CLINIC | Age: 79
End: 2019-12-26

## 2019-12-26 RX ORDER — AZATHIOPRINE 50 MG/1
50 TABLET ORAL DAILY
Qty: 30 TABLET | Refills: 3 | Status: SHIPPED | OUTPATIENT
Start: 2019-12-26 | End: 2019-12-26 | Stop reason: SDUPTHER

## 2019-12-26 RX ORDER — AZATHIOPRINE 50 MG/1
50 TABLET ORAL DAILY
Qty: 30 TABLET | Refills: 3 | Status: SHIPPED | OUTPATIENT
Start: 2019-12-26 | End: 2020-01-29 | Stop reason: SDUPTHER

## 2019-12-26 RX ORDER — AZATHIOPRINE 50 MG/1
50 TABLET ORAL DAILY
COMMUNITY
End: 2019-12-26 | Stop reason: SDUPTHER

## 2019-12-26 NOTE — TELEPHONE ENCOUNTER
Informed Spouse Anne   that [facility] received prescription for Imuran and benefits investigation is required.  [facility] will be back in touch once insurance determination is received.
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
## 2019-12-26 NOTE — TELEPHONE ENCOUNTER
Pt is unable to leave house I had to cancel at [facility]. Can you resend to [facility]?

## 2020-01-20 ENCOUNTER — LAB VISIT (OUTPATIENT)
Dept: LAB | Facility: OTHER | Age: 80
End: 2020-01-20
Attending: INTERNAL MEDICINE
Payer: MEDICARE

## 2020-01-20 ENCOUNTER — OFFICE VISIT (OUTPATIENT)
Dept: INTERNAL MEDICINE | Facility: CLINIC | Age: 80
End: 2020-01-20
Attending: INTERNAL MEDICINE
Payer: MEDICARE

## 2020-01-20 VITALS
SYSTOLIC BLOOD PRESSURE: 130 MMHG | HEIGHT: 64 IN | HEART RATE: 76 BPM | DIASTOLIC BLOOD PRESSURE: 70 MMHG | WEIGHT: 156.06 LBS | BODY MASS INDEX: 26.64 KG/M2 | OXYGEN SATURATION: 95 %

## 2020-01-20 DIAGNOSIS — R09.02 HYPOXEMIA: ICD-10-CM

## 2020-01-20 DIAGNOSIS — I10 ESSENTIAL HYPERTENSION: ICD-10-CM

## 2020-01-20 DIAGNOSIS — F32.5 MAJOR DEPRESSIVE DISORDER WITH SINGLE EPISODE, IN FULL REMISSION: ICD-10-CM

## 2020-01-20 DIAGNOSIS — C67.9 MALIGNANT NEOPLASM OF URINARY BLADDER, UNSPECIFIED SITE: Primary | ICD-10-CM

## 2020-01-20 DIAGNOSIS — R79.9 ABNORMAL FINDING OF BLOOD CHEMISTRY, UNSPECIFIED: ICD-10-CM

## 2020-01-20 DIAGNOSIS — R21 RASH: ICD-10-CM

## 2020-01-20 DIAGNOSIS — N40.0 BENIGN PROSTATIC HYPERPLASIA WITHOUT LOWER URINARY TRACT SYMPTOMS: ICD-10-CM

## 2020-01-20 LAB
ALBUMIN SERPL BCP-MCNC: 4.3 G/DL (ref 3.5–5.2)
ALP SERPL-CCNC: 50 U/L (ref 55–135)
ALT SERPL W/O P-5'-P-CCNC: 23 U/L (ref 10–44)
ANION GAP SERPL CALC-SCNC: 8 MMOL/L (ref 8–16)
AST SERPL-CCNC: 29 U/L (ref 10–40)
BASOPHILS # BLD AUTO: 0.02 K/UL (ref 0–0.2)
BASOPHILS NFR BLD: 0.4 % (ref 0–1.9)
BILIRUB SERPL-MCNC: 1 MG/DL (ref 0.1–1)
BUN SERPL-MCNC: 18 MG/DL (ref 8–23)
CALCIUM SERPL-MCNC: 9.8 MG/DL (ref 8.7–10.5)
CHLORIDE SERPL-SCNC: 106 MMOL/L (ref 95–110)
CHOLEST SERPL-MCNC: 209 MG/DL (ref 120–199)
CHOLEST/HDLC SERPL: 4.3 {RATIO} (ref 2–5)
CO2 SERPL-SCNC: 29 MMOL/L (ref 23–29)
CREAT SERPL-MCNC: 0.8 MG/DL (ref 0.5–1.4)
DIFFERENTIAL METHOD: ABNORMAL
EOSINOPHIL # BLD AUTO: 0.2 K/UL (ref 0–0.5)
EOSINOPHIL NFR BLD: 3.2 % (ref 0–8)
ERYTHROCYTE [DISTWIDTH] IN BLOOD BY AUTOMATED COUNT: 17 % (ref 11.5–14.5)
EST. GFR  (AFRICAN AMERICAN): >60 ML/MIN/1.73 M^2
EST. GFR  (NON AFRICAN AMERICAN): >60 ML/MIN/1.73 M^2
ESTIMATED AVG GLUCOSE: 108 MG/DL (ref 68–131)
GLUCOSE SERPL-MCNC: 89 MG/DL (ref 70–110)
HBA1C MFR BLD HPLC: 5.4 % (ref 4–5.6)
HCT VFR BLD AUTO: 40.5 % (ref 40–54)
HDLC SERPL-MCNC: 49 MG/DL (ref 40–75)
HDLC SERPL: 23.4 % (ref 20–50)
HGB BLD-MCNC: 13.1 G/DL (ref 14–18)
IMM GRANULOCYTES # BLD AUTO: 0.01 K/UL (ref 0–0.04)
IMM GRANULOCYTES NFR BLD AUTO: 0.2 % (ref 0–0.5)
LDLC SERPL CALC-MCNC: 140.6 MG/DL (ref 63–159)
LYMPHOCYTES # BLD AUTO: 1.6 K/UL (ref 1–4.8)
LYMPHOCYTES NFR BLD: 27.5 % (ref 18–48)
MCH RBC QN AUTO: 34.6 PG (ref 27–31)
MCHC RBC AUTO-ENTMCNC: 32.3 G/DL (ref 32–36)
MCV RBC AUTO: 107 FL (ref 82–98)
MONOCYTES # BLD AUTO: 0.5 K/UL (ref 0.3–1)
MONOCYTES NFR BLD: 9.2 % (ref 4–15)
NEUTROPHILS # BLD AUTO: 3.4 K/UL (ref 1.8–7.7)
NEUTROPHILS NFR BLD: 59.5 % (ref 38–73)
NONHDLC SERPL-MCNC: 160 MG/DL
NRBC BLD-RTO: 0 /100 WBC
PLATELET # BLD AUTO: 190 K/UL (ref 150–350)
PMV BLD AUTO: 11.1 FL (ref 9.2–12.9)
POTASSIUM SERPL-SCNC: 5.3 MMOL/L (ref 3.5–5.1)
PROT SERPL-MCNC: 7.3 G/DL (ref 6–8.4)
RBC # BLD AUTO: 3.79 M/UL (ref 4.6–6.2)
SODIUM SERPL-SCNC: 143 MMOL/L (ref 136–145)
TRIGL SERPL-MCNC: 97 MG/DL (ref 30–150)
TSH SERPL DL<=0.005 MIU/L-ACNC: 0.61 UIU/ML (ref 0.4–4)
WBC # BLD AUTO: 5.68 K/UL (ref 3.9–12.7)

## 2020-01-20 PROCEDURE — 3078F DIAST BP <80 MM HG: CPT | Mod: CPTII,S$GLB,, | Performed by: INTERNAL MEDICINE

## 2020-01-20 PROCEDURE — 99214 OFFICE O/P EST MOD 30 MIN: CPT | Mod: S$GLB,,, | Performed by: INTERNAL MEDICINE

## 2020-01-20 PROCEDURE — 1101F PR PT FALLS ASSESS DOC 0-1 FALLS W/OUT INJ PAST YR: ICD-10-PCS | Mod: CPTII,S$GLB,, | Performed by: INTERNAL MEDICINE

## 2020-01-20 PROCEDURE — 1126F AMNT PAIN NOTED NONE PRSNT: CPT | Mod: S$GLB,,, | Performed by: INTERNAL MEDICINE

## 2020-01-20 PROCEDURE — 1159F PR MEDICATION LIST DOCUMENTED IN MEDICAL RECORD: ICD-10-PCS | Mod: S$GLB,,, | Performed by: INTERNAL MEDICINE

## 2020-01-20 PROCEDURE — 83036 HEMOGLOBIN GLYCOSYLATED A1C: CPT

## 2020-01-20 PROCEDURE — 1159F MED LIST DOCD IN RCRD: CPT | Mod: S$GLB,,, | Performed by: INTERNAL MEDICINE

## 2020-01-20 PROCEDURE — 85025 COMPLETE CBC W/AUTO DIFF WBC: CPT

## 2020-01-20 PROCEDURE — 3075F PR MOST RECENT SYSTOLIC BLOOD PRESS GE 130-139MM HG: ICD-10-PCS | Mod: CPTII,S$GLB,, | Performed by: INTERNAL MEDICINE

## 2020-01-20 PROCEDURE — 1101F PT FALLS ASSESS-DOCD LE1/YR: CPT | Mod: CPTII,S$GLB,, | Performed by: INTERNAL MEDICINE

## 2020-01-20 PROCEDURE — 84443 ASSAY THYROID STIM HORMONE: CPT

## 2020-01-20 PROCEDURE — 99214 PR OFFICE/OUTPT VISIT, EST, LEVL IV, 30-39 MIN: ICD-10-PCS | Mod: S$GLB,,, | Performed by: INTERNAL MEDICINE

## 2020-01-20 PROCEDURE — 99999 PR PBB SHADOW E&M-EST. PATIENT-LVL IV: CPT | Mod: PBBFAC,,, | Performed by: INTERNAL MEDICINE

## 2020-01-20 PROCEDURE — 3078F PR MOST RECENT DIASTOLIC BLOOD PRESSURE < 80 MM HG: ICD-10-PCS | Mod: CPTII,S$GLB,, | Performed by: INTERNAL MEDICINE

## 2020-01-20 PROCEDURE — 80053 COMPREHEN METABOLIC PANEL: CPT

## 2020-01-20 PROCEDURE — 3075F SYST BP GE 130 - 139MM HG: CPT | Mod: CPTII,S$GLB,, | Performed by: INTERNAL MEDICINE

## 2020-01-20 PROCEDURE — 1126F PR PAIN SEVERITY QUANTIFIED, NO PAIN PRESENT: ICD-10-PCS | Mod: S$GLB,,, | Performed by: INTERNAL MEDICINE

## 2020-01-20 PROCEDURE — 36415 COLL VENOUS BLD VENIPUNCTURE: CPT

## 2020-01-20 PROCEDURE — 80061 LIPID PANEL: CPT

## 2020-01-20 PROCEDURE — 99999 PR PBB SHADOW E&M-EST. PATIENT-LVL IV: ICD-10-PCS | Mod: PBBFAC,,, | Performed by: INTERNAL MEDICINE

## 2020-01-20 RX ORDER — VENLAFAXINE HYDROCHLORIDE 75 MG/1
CAPSULE, EXTENDED RELEASE ORAL
Qty: 180 CAPSULE | Refills: 3
Start: 2020-01-20 | End: 2020-04-14

## 2020-01-20 RX ORDER — PAROXETINE HYDROCHLORIDE 20 MG/1
20 TABLET, FILM COATED ORAL EVERY MORNING
Qty: 90 TABLET | Refills: 3 | Status: SHIPPED | OUTPATIENT
Start: 2020-01-20 | End: 2020-04-13 | Stop reason: SDUPTHER

## 2020-01-20 NOTE — PROGRESS NOTES
"Subjective:       Patient ID: Garry Rooney is a 79 y.o. male.    Chief Complaint: Annual Exam    Here for routine f/u    77 yo M with PMHx of COPD (oxygen dependant), Crohn's multiple compression fracture pain controled with chronic opiate regimen, hx of bladder CA ( in 1990s) No longer pursuing surveillance)    Skin is dry everywhere.  He also complains of chronic dry mouth for over a year.  He takes Bentyl b.i.d. for his chronic Crohn's as well as 75 mg of Effexor.  Both of these medications have a high incidence of dry mouth as side effect.    He conserves his oxygen at home. He finds he is no longer omi to achieve this. He was always 94-95%. He can not walk up 1 flight of stairs without being omi to do anything once there. He is having difficulty talking. His oxygenation is getting to low 80s.  Now it is 1 liberal with his oxygen is symptoms are mildly improved.  No new chronic cough.          Review of Systems   Constitutional: Negative for appetite change, chills, fever and unexpected weight change.   HENT: Negative for hearing loss, sore throat and trouble swallowing.    Eyes: Negative for visual disturbance.   Respiratory: Positive for shortness of breath. Negative for cough and chest tightness.    Cardiovascular: Negative for chest pain and leg swelling.   Gastrointestinal: Negative for abdominal pain, blood in stool, constipation, diarrhea, nausea and vomiting.   Endocrine: Negative for polydipsia and polyuria.   Genitourinary: Negative for decreased urine volume, difficulty urinating, dysuria, frequency and urgency.   Musculoskeletal: Negative for gait problem.   Skin: Negative for rash.   Neurological: Negative for dizziness and numbness.   Psychiatric/Behavioral: The patient is not nervous/anxious.        Objective:      Vitals:    01/20/20 1005   BP: 130/70   Pulse: 76   SpO2: 95%   Weight: 70.8 kg (156 lb 1.4 oz)   Height: 5' 4" (1.626 m)      Physical Exam   Constitutional: He is oriented to " person, place, and time. He appears well-developed and well-nourished. He does not have a sickly appearance. No distress.   HENT:   Head: Normocephalic and atraumatic.   Eyes: Conjunctivae and EOM are normal. Right eye exhibits no discharge. Left eye exhibits no discharge. No scleral icterus.   Pulmonary/Chest: Effort normal. No respiratory distress.   Abdominal: Normal appearance. He exhibits no distension.   Neurological: He is alert and oriented to person, place, and time.   Skin: Skin is warm and dry. He is not diaphoretic.   Psychiatric: He has a normal mood and affect. His speech is normal.       Assessment:       1. Malignant neoplasm of urinary bladder, unspecified site    2. Major depressive disorder with single episode, in full remission    3. Essential hypertension    4. Benign prostatic hyperplasia without lower urinary tract symptoms    5. Rash    6. Hypoxemia    7. Abnormal finding of blood chemistry, unspecified         Plan:       Garry was seen today for annual exam.    Diagnoses and all orders for this visit:    Malignant neoplasm of urinary bladder, unspecified site    Major depressive disorder with single episode, in full remission   We can not stop bentyl. Cross taper to paxil  -     paroxetine (PAXIL) 20 MG tablet; Take 1 tablet (20 mg total) by mouth every morning.    Essential hypertension  -     Comprehensive metabolic panel; Future  -     Lipid panel; Future  -     TSH; Future  -     CBC auto differential; Future  -     Hemoglobin A1c; Future    Benign prostatic hyperplasia without lower urinary tract symptoms    Rash  -     Ambulatory Referral to Dermatology    Hypoxemia  Role of oxygen discussed. No acute distress. Office and Emergency Department prompts discussed.  -     Ambulatory Referral to Pulmonology        -     venlafaxine (EFFEXOR-XR) 75 MG 24 hr capsule; TAKE 1 CAPSULE TWICE DAILY           Shiv Vaca MD  Internal Medicine-[facility]        Side effects of  medication(s) were discussed in detail and patient voiced understanding.  Patient will call back for any issues or complications.

## 2020-01-22 ENCOUNTER — OFFICE VISIT (OUTPATIENT)
Dept: PULMONOLOGY | Facility: CLINIC | Age: 80
End: 2020-01-22
Payer: MEDICARE

## 2020-01-22 ENCOUNTER — OFFICE VISIT (OUTPATIENT)
Dept: DERMATOLOGY | Facility: CLINIC | Age: 80
End: 2020-01-22
Payer: MEDICARE

## 2020-01-22 ENCOUNTER — PATIENT OUTREACH (OUTPATIENT)
Dept: ADMINISTRATIVE | Facility: OTHER | Age: 80
End: 2020-01-22

## 2020-01-22 VITALS
BODY MASS INDEX: 26.27 KG/M2 | WEIGHT: 153.88 LBS | HEIGHT: 64 IN | DIASTOLIC BLOOD PRESSURE: 88 MMHG | OXYGEN SATURATION: 95 % | HEART RATE: 84 BPM | SYSTOLIC BLOOD PRESSURE: 140 MMHG

## 2020-01-22 DIAGNOSIS — Z86.006 HISTORY OF MELANOMA IN SITU: ICD-10-CM

## 2020-01-22 DIAGNOSIS — L82.1 SEBORRHEIC KERATOSES: ICD-10-CM

## 2020-01-22 DIAGNOSIS — D48.9 NEOPLASM OF UNCERTAIN BEHAVIOR: ICD-10-CM

## 2020-01-22 DIAGNOSIS — Z12.83 SCREENING EXAM FOR SKIN CANCER: Primary | ICD-10-CM

## 2020-01-22 DIAGNOSIS — R06.00 DYSPNEA, UNSPECIFIED TYPE: Primary | ICD-10-CM

## 2020-01-22 DIAGNOSIS — D23.5 DILATED PORE OF WINER OF BACK: ICD-10-CM

## 2020-01-22 PROCEDURE — 1159F MED LIST DOCD IN RCRD: CPT | Mod: S$GLB,,, | Performed by: DERMATOLOGY

## 2020-01-22 PROCEDURE — 1126F PR PAIN SEVERITY QUANTIFIED, NO PAIN PRESENT: ICD-10-PCS | Mod: S$GLB,,, | Performed by: DERMATOLOGY

## 2020-01-22 PROCEDURE — 3078F DIAST BP <80 MM HG: CPT | Mod: CPTII,S$GLB,, | Performed by: DERMATOLOGY

## 2020-01-22 PROCEDURE — 99203 PR OFFICE/OUTPT VISIT, NEW, LEVL III, 30-44 MIN: ICD-10-PCS | Mod: S$GLB,,, | Performed by: DERMATOLOGY

## 2020-01-22 PROCEDURE — 99203 PR OFFICE/OUTPT VISIT, NEW, LEVL III, 30-44 MIN: ICD-10-PCS | Mod: S$GLB,,, | Performed by: NURSE PRACTITIONER

## 2020-01-22 PROCEDURE — 1101F PT FALLS ASSESS-DOCD LE1/YR: CPT | Mod: CPTII,S$GLB,, | Performed by: DERMATOLOGY

## 2020-01-22 PROCEDURE — 1101F PR PT FALLS ASSESS DOC 0-1 FALLS W/OUT INJ PAST YR: ICD-10-PCS | Mod: CPTII,S$GLB,, | Performed by: DERMATOLOGY

## 2020-01-22 PROCEDURE — 99203 OFFICE O/P NEW LOW 30 MIN: CPT | Mod: S$GLB,,, | Performed by: DERMATOLOGY

## 2020-01-22 PROCEDURE — 99999 PR PBB SHADOW E&M-EST. PATIENT-LVL III: ICD-10-PCS | Mod: PBBFAC,,, | Performed by: DERMATOLOGY

## 2020-01-22 PROCEDURE — 3078F PR MOST RECENT DIASTOLIC BLOOD PRESSURE < 80 MM HG: ICD-10-PCS | Mod: CPTII,S$GLB,, | Performed by: DERMATOLOGY

## 2020-01-22 PROCEDURE — 3077F SYST BP >= 140 MM HG: CPT | Mod: CPTII,S$GLB,, | Performed by: DERMATOLOGY

## 2020-01-22 PROCEDURE — 1126F AMNT PAIN NOTED NONE PRSNT: CPT | Mod: S$GLB,,, | Performed by: DERMATOLOGY

## 2020-01-22 PROCEDURE — 1159F PR MEDICATION LIST DOCUMENTED IN MEDICAL RECORD: ICD-10-PCS | Mod: S$GLB,,, | Performed by: DERMATOLOGY

## 2020-01-22 PROCEDURE — 3077F PR MOST RECENT SYSTOLIC BLOOD PRESSURE >= 140 MM HG: ICD-10-PCS | Mod: CPTII,S$GLB,, | Performed by: DERMATOLOGY

## 2020-01-22 PROCEDURE — 99999 PR PBB SHADOW E&M-EST. PATIENT-LVL III: ICD-10-PCS | Mod: PBBFAC,,, | Performed by: NURSE PRACTITIONER

## 2020-01-22 PROCEDURE — 99999 PR PBB SHADOW E&M-EST. PATIENT-LVL III: CPT | Mod: PBBFAC,,, | Performed by: DERMATOLOGY

## 2020-01-22 PROCEDURE — 99999 PR PBB SHADOW E&M-EST. PATIENT-LVL III: CPT | Mod: PBBFAC,,, | Performed by: NURSE PRACTITIONER

## 2020-01-22 PROCEDURE — 99203 OFFICE O/P NEW LOW 30 MIN: CPT | Mod: S$GLB,,, | Performed by: NURSE PRACTITIONER

## 2020-01-22 NOTE — PROGRESS NOTES
"Subjective:       Patient ID: Garry Rooney is a 79 y.o. male.    Chief Complaint: hypoxemia    HPI:   Garry Rooney is a 79 y.o. male who presents with hypoxia.  Mr. Nevin donis has noticed has been more dependent  on his oxygen supplementation- over the last few months. Explains can experiences dyspnea on exertion when walking up a flight of stairs. He tells being very active and spends a lot of his time with his wife at there home in [place].    Reports home o2 sats 93-95% at home on oxygen at 2 L per NC.   He denies cough, wheezing, fatigue, fever, chills or CP. Overall, he reports feeling well. 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  Denies smoking hx.     Review of Systems   Constitutional: Negative for fever, chills, weight loss and night sweats.   HENT: Negative for postnasal drip, rhinorrhea, trouble swallowing and congestion.    Respiratory: Positive for dyspnea on extertion (Reports uses supplemental oxygen.). Negative for cough, sputum production, choking, chest tightness, wheezing and use of rescue inhaler.    Cardiovascular: Negative for chest pain and leg swelling.   Musculoskeletal: Negative for joint swelling.   Skin: Negative for rash.   Gastrointestinal: Negative for acid reflux.   Neurological: Negative for dizziness and light-headedness.   Hematological: Negative for adenopathy.   Psychiatric/Behavioral: Negative for sleep disturbance.       Social History     Tobacco Use    Smoking status: Never Smoker    Smokeless tobacco: Never Used   Substance Use Topics    Alcohol use: Yes     Comment: ocassionally - rarely       Review of patient's allergies indicates:   Allergen Reactions    Fosamax [alendronate]     Reclast [zoledronic acid-mannitol-water]     Sesame seed Other (See Comments)     "Can't breathe"    Sulfa (sulfonamide antibiotics)      Past Medical History:   Diagnosis Date    Anemia     Bladder cancer     " COPD (chronic obstructive pulmonary disease)     Crohn's disease     Depression     Encounter for long-term (current) use of high-risk medication     Hypertension     Melanoma     Osteoporosis, unspecified      Past Surgical History:   Procedure Laterality Date    APPENDECTOMY      COLON SURGERY      2002    COLONOSCOPY      2010    COLONOSCOPY N/A 10/15/2015    Procedure: COLONOSCOPY;  Surgeon: Julio César Lackey MD;  Location: [facility] ENDO (4TH FLR);  Service: Endoscopy;  Laterality: N/A;    COLONOSCOPY N/A 2/16/2017    Procedure: COLONOSCOPY;  Surgeon: Julio César Lackey MD;  [facility]  Service: Endoscopy;  Laterality: N/A;    HERNIA REPAIR       Current Outpatient Medications on File Prior to Visit   Medication Sig    adalimumab (HUMIRA PEN) 40 mg/0.8 mL PnKt Inject 1 pen into skin every 7 days.    ASACOL  mg TbEC Take 1 tablet (800 mg total) by mouth 3 (three) times daily.    azaTHIOprine (IMURAN) 50 mg Tab Take 1 tablet (50 mg total) by mouth once daily.    ciprofloxacin HCl (CIPRO) 500 MG tablet Take 1 tablet (500 mg total) by mouth 2 (two) times daily.    finasteride (PROSCAR) 5 mg tablet Take 1 tablet (5 mg total) by mouth once daily.    HUMIRA PEN PnKt injection INJECT 0.8 MLS (40MG) INTO THE SKIN EVERY 14 DAYS    hyoscyamine (LEVBID) 0.375 mg Tb12 TAKE 1 TABLET EVERY 12 HOURS.    oxyCODONE-acetaminophen (PERCOCET)  mg per tablet Take 1 tablet by mouth 3 (three) times daily as needed for Pain.    [START ON 2/17/2020] oxyCODONE-acetaminophen (PERCOCET)  mg per tablet Take 1 tablet by mouth 3 (three) times daily as needed for Pain.    paroxetine (PAXIL) 20 MG tablet Take 1 tablet (20 mg total) by mouth every morning.    valsartan (DIOVAN) 40 MG tablet Take 1 tablet (40 mg total) by mouth once daily.    venlafaxine (EFFEXOR-XR) 75 MG 24 hr capsule TAKE 1 CAPSULE TWICE DAILY     No current facility-administered medications on file prior to visit.   "    Objective:      Vitals:    01/22/20 1609   BP: (!) 140/88   BP Location: Right arm   Patient Position: Sitting   Pulse: 84   SpO2: 95%  Comment: 2L   Weight: 69.8 kg (153 lb 14.1 oz)   Height: 5' 4" (1.626 m)     Physical Exam   Constitutional: He is oriented to person, place, and time. He appears well-developed and well-nourished. No distress.   HENT:   Head: Normocephalic.   Neck: Normal range of motion. Neck supple.   Cardiovascular: Normal rate, regular rhythm and normal heart sounds.   Pulmonary/Chest: Normal expansion, effort normal and breath sounds normal. No respiratory distress. He has no wheezes. He has no rhonchi.   Using portable oxygen concentrator.    Abdominal: Soft. Bowel sounds are normal. There is no hepatosplenomegaly.   Musculoskeletal: Normal range of motion. He exhibits deformity (Kyphosis). He exhibits no edema.   Lymphadenopathy: No supraclavicular adenopathy is present.     He has no cervical adenopathy.   Neurological: He is alert and oriented to person, place, and time. Gait normal.   Skin: Skin is warm and dry. Nails show no clubbing.   Psychiatric: He has a normal mood and affect. His behavior is normal.   Vitals reviewed.    Personal Diagnostic Review    Personally reviews Stress test from 3/10/2014 with patient in office today.   Results for DALE ARTHUR (MRN [id number]) as of 1/25/2020 10:50   Ref. Range 4/1/2019 08:27   WBC Latest Ref Range: 3.90 - 12.70 K/uL 7.34   RBC Latest Ref Range: 4.60 - 6.20 M/uL 4.23 (L)   Hemoglobin Latest Ref Range: 14.0 - 18.0 g/dL 13.5 (L)   Hematocrit Latest Ref Range: 40.0 - 54.0 % 41.9   MCV Latest Ref Range: 82 - 98 fL 99 (H)   MCH Latest Ref Range: 27.0 - 31.0 pg 31.9 (H)   MCHC Latest Ref Range: 32.0 - 36.0 g/dL 32.2   RDW Latest Ref Range: 11.5 - 14.5 % 16.2 (H)   Platelets Latest Ref Range: 150 - 350 K/uL 368 (H)   MPV Latest Ref Range: 9.2 - 12.9 fL 10.7   Gran% Latest Ref Range: 38.0 - 73.0 % 59.7   Gran # (ANC) Latest Ref Range: 1.8 - 7.7 " K/uL 4.4   Lymph% Latest Ref Range: 18.0 - 48.0 % 29.3   Lymph # Latest Ref Range: 1.0 - 4.8 K/uL 2.2   Mono% Latest Ref Range: 4.0 - 15.0 % 7.1   Mono # Latest Ref Range: 0.3 - 1.0 K/uL 0.5   Eosinophil% Latest Ref Range: 0.0 - 8.0 % 3.3   Eos # Latest Ref Range: 0.0 - 0.5 K/uL 0.2   Basophil% Latest Ref Range: 0.0 - 1.9 % 0.3   Baso # Latest Ref Range: 0.00 - 0.20 K/uL 0.02     Results for DALE ARHTUR (MRN [id number]) as of 1/25/2020 10:50   Ref. Range 1/20/2020 12:23   Sodium Latest Ref Range: 136 - 145 mmol/L 143   Potassium Latest Ref Range: 3.5 - 5.1 mmol/L 5.3 (H)   Chloride Latest Ref Range: 95 - 110 mmol/L 106   CO2 Latest Ref Range: 23 - 29 mmol/L 29   Anion Gap Latest Ref Range: 8 - 16 mmol/L 8   BUN, Bld Latest Ref Range: 8 - 23 mg/dL 18   Creatinine Latest Ref Range: 0.5 - 1.4 mg/dL 0.8   eGFR if non African American Latest Ref Range: >60 mL/min/1.73 m^2 >60   eGFR if  Latest Ref Range: >60 mL/min/1.73 m^2 >60   Glucose Latest Ref Range: 70 - 110 mg/dL 89   Calcium Latest Ref Range: 8.7 - 10.5 mg/dL 9.8   Alkaline Phosphatase Latest Ref Range: 55 - 135 U/L 50 (L)   PROTEIN TOTAL Latest Ref Range: 6.0 - 8.4 g/dL 7.3   Albumin Latest Ref Range: 3.5 - 5.2 g/dL 4.3   BILIRUBIN TOTAL Latest Ref Range: 0.1 - 1.0 mg/dL 1.0   AST Latest Ref Range: 10 - 40 U/L 29   ALT Latest Ref Range: 10 - 44 U/L 23   Results for DALE ARTHUR (MRN [id number]) as of 1/25/2020 10:50   Ref. Range 10/31/2017 15:50 11/1/2017 14:14 11/10/2017 09:05 12/3/2018 09:21 4/1/2019 08:27 4/4/2019 15:38 1/20/2020 12:23   CO2 Latest Ref Range: 23 - 29 mmol/L 30 (H)   31 (H) 26  29     Assessment:     Problem List Items Addressed This Visit        Other    Dyspnea - Primary    Current Assessment & Plan     Discussed further testing options to better understand symptom of dyspnea.Pateint agrees to have CXR and PFTs, He explain not willing to have pulmonary stress, capillary blood gas, or be referred to sleep medication or  possible need of Bipap (CO2 slightly elevated). Patient explains at his age of 79 - would like to keep testing at a minimum. I agree with his decision.      Plan:  -PFTs  -CXR  -Continue oxygen.   -Follow up after testing.          Relevant Orders    Spirometry without Bronchodilator    LUNG VOLUMES    DLCO-Carbon Monoxide Diffusing Capacity    X-Ray Chest PA And Lateral      Will have follow up after testing.

## 2020-01-22 NOTE — LETTER
January 25, 2020      Shiv Trejo MD  [address]  [address]  [facility] [address]           [facility]  [address]  [place] [address]  Phone: [phone]          Patient: Garry Rooney   MR Number: [id number]   YOB: 1940   Date of Visit: 1/22/2020       Dear Dr. Shiv Trejo:    Thank you for referring Garry Rooney to me for evaluation. Attached you will find relevant portions of my assessment and plan of care.    If you have questions, please do not hesitate to call me. I look forward to following Garry Rooney along with you.    Sincerely,    Haley Krishna, NP    Enclosure  CC:  No Recipients    If you would like to receive this communication electronically, please contact [email or web address] or [phone] to request more information on [facility] access.    For providers and/or their staff who would like to refer a patient to [facility], please contact us through our one-stop-shop provider referral line, [facility] , at [phone].    If you feel you have received this communication in error or would no longer like to receive these types of communications, please e-mail [email or web address]

## 2020-01-22 NOTE — PROGRESS NOTES
"  Subjective:       Patient ID:  Garry Rooney is a 79 y.o. male who presents for   Chief Complaint   Patient presents with    Skin Check     tbse     79 year old male new patient with hx of bladder cancer and Crohn's disease. Previously seen by Dr. Ashley Eason. Reports diagnosed with Melanoma in 2016. Chart review reveals MMIS excised by Philip in January 2017. No issues with site since.  He has not seen a dermatologist since that melanoma was treated.    Denies any other skin cancers other than the melanoma.   Reports many "dark moles" to back that he would like to be checked.   Denies any bleeding, growing, changing lesions.     Also, on humira for years for advanced crohns. Reports controlled currently. His medication list reports that he is also taking Imuran but he denies this today.  med list shows tx with imuran; pt denies current use of this medication.    Review of Systems   Constitutional: Negative for fever and chills.   Skin: Negative for daily sunscreen use, activity-related sunscreen use, recent sunburn and wears hat.   Hematologic/Lymphatic: Bruises/bleeds easily.        Objective:    Physical Exam   Constitutional: He appears well-developed and well-nourished.   Neurological: He is alert and oriented to person, place, and time.   Psychiatric: He has a normal mood and affect.   Skin:   Areas Examined (abnormalities noted in diagram):   Scalp / Hair Palpated and Inspected  Head / Face Inspection Performed  Neck Inspection Performed  Chest / Axilla Inspection Performed  Abdomen Inspection Performed  Genitals / Buttocks / Groin Inspection Performed  Back Inspection Performed  RUE Inspected  LUE Inspection Performed  RLE Inspected  LLE Inspection Performed  Nails and Digits Inspection Performed                   Diagram Legend     Erythematous scaling macule/papule c/w actinic keratosis       Vascular papule c/w angioma      Pigmented verrucoid papule/plaque c/w seborrheic keratosis      Yellow " umbilicated papule c/w sebaceous hyperplasia      Irregularly shaped tan macule c/w lentigo     1-2 mm smooth white papules consistent with Milia      Movable subcutaneous cyst with punctum c/w epidermal inclusion cyst      Subcutaneous movable cyst c/w pilar cyst      Firm pink to brown papule c/w dermatofibroma      Pedunculated fleshy papule(s) c/w skin tag(s)      Evenly pigmented macule c/w junctional nevus     Mildly variegated pigmented, slightly irregular-bordered macule c/w mildly atypical nevus      Flesh colored to evenly pigmented papule c/w intradermal nevus       Pink pearly papule/plaque c/w basal cell carcinoma      Erythematous hyperkeratotic cursted plaque c/w SCC      Surgical scar with no sign of skin cancer recurrence      Open and closed comedones      Inflammatory papules and pustules      Verrucoid papule consistent consistent with wart     Erythematous eczematous patches and plaques     Dystrophic onycholytic nail with subungual debris c/w onychomycosis     Umbilicated papule    Erythematous-base heme-crusted tan verrucoid plaque consistent with inflamed seborrheic keratosis     Erythematous Silvery Scaling Plaque c/w Psoriasis     See annotation      Left dorsal foot - needs biopsy - pt rescheduled        Left chest - needs biopsy - rescheduled for next week  Assessment / Plan:        Screening exam for skin cancer  Area of previous melanoma examined. Site well healed with no signs of recurrence.    Total body skin examination performed today including at least 12 points as noted in physical examination.  lesions suspicious for malignancy noted.    Seborrheic keratoses - back  These are benign inherited growths without a malignant potential. Reassurance given to patient. No treatment is necessary.     Neoplasm of uncertain behavior  X 2 left chest and left dorsal foot  Pt unable to have biopsies today due to another doctors appointment  Rescheduled    Dilated pore of Darrel of back  Would  like excision but leaving the country [place]20    History of melanoma in situ  No recurrence         Pt reports he is no longer on Imuran but per GI notes he should be on this; will message GI.    Follow up in about 2 weeks (around 2/5/2020), or for biopsies.

## 2020-01-23 ENCOUNTER — TELEPHONE (OUTPATIENT)
Dept: GASTROENTEROLOGY | Facility: CLINIC | Age: 80
End: 2020-01-23

## 2020-01-23 NOTE — TELEPHONE ENCOUNTER
----- Message from Mariel Christy sent at 1/23/2020 12:43 PM CST -----  Contact: pt [phone]  Pt is returning a call from Frida. Please call

## 2020-01-23 NOTE — TELEPHONE ENCOUNTER
----- Message from Julio César Lackey MD sent at 1/23/2020 12:33 PM CST -----  Thank you  I thought he was still taking imuran    Frida, do we have f/u appt with me set?  ----- Message -----  From: Makenna Barragan MD  Sent: 1/23/2020  12:22 PM CST  To: Julio César Lackey MD    Hi Dr. Lackey, I saw this patient yesterday for skin check. He has a history of melanoma in situ.  I noted Imuran on his med list (which increases risk of skin cancers) and he denies that he has taken it for a long time.  Wanted to make you aware.  He says he is only on Humira for his Crohn's at this time.  Thanks, Makenna Barragan (derm)

## 2020-01-25 PROBLEM — R06.00 DYSPNEA: Status: ACTIVE | Noted: 2020-01-25

## 2020-01-25 NOTE — ASSESSMENT & PLAN NOTE
Discussed further testing options to better understand symptom of dyspnea.Pateint agrees to have CXR and PFTs, He explain not willing to have pulmonary stress, capillary blood gas, or be referred to sleep medication or possible need of Bipap (CO2 slightly elevated). Patient explains at his age of 79 - would like to keep testing at a minimum. I agree with his decision.      Plan:  -PFTs  -CXR  -Continue oxygen.   -Follow up after testing.

## 2020-01-28 ENCOUNTER — HOSPITAL ENCOUNTER (OUTPATIENT)
Dept: PULMONOLOGY | Facility: CLINIC | Age: 80
Discharge: HOME | End: 2020-01-28
Payer: MEDICARE

## 2020-01-28 ENCOUNTER — HOSPITAL ENCOUNTER (OUTPATIENT)
Dept: RADIOLOGY | Facility: HOSPITAL | Age: 80
Discharge: HOME | End: 2020-01-28
Attending: NURSE PRACTITIONER
Payer: MEDICARE

## 2020-01-28 ENCOUNTER — OFFICE VISIT (OUTPATIENT)
Dept: DERMATOLOGY | Facility: CLINIC | Age: 80
End: 2020-01-28
Payer: MEDICARE

## 2020-01-28 DIAGNOSIS — R06.00 DYSPNEA, UNSPECIFIED TYPE: ICD-10-CM

## 2020-01-28 DIAGNOSIS — D48.5 NEOPLASM OF UNCERTAIN BEHAVIOR OF SKIN: Primary | ICD-10-CM

## 2020-01-28 DIAGNOSIS — L57.0 ACTINIC KERATOSIS: ICD-10-CM

## 2020-01-28 LAB
DLCO ADJ PRE: 10.12 ML/(MIN*MMHG) (ref 12.18–26.04)
DLCO SINGLE BREATH LLN: 12.18
DLCO SINGLE BREATH PRE REF: 50.6 %
DLCO SINGLE BREATH REF: 19.11
DLCOC SBVA LLN: 1.96
DLCOC SBVA PRE REF: 97.4 %
DLCOC SBVA REF: 3.35
DLCOC SINGLE BREATH LLN: 12.18
DLCOC SINGLE BREATH PRE REF: 53 %
DLCOC SINGLE BREATH REF: 19.11
DLCOCSBVAULN: 4.74
DLCOCSINGLEBREATHULN: 26.04
DLCOSINGLEBREATHULN: 26.04
DLCOVA LLN: 1.96
DLCOVA PRE REF: 93 %
DLCOVA PRE: 3.12 ML/(MIN*MMHG*L) (ref 1.96–4.74)
DLCOVA REF: 3.35
DLCOVAULN: 4.74
DLVAADJ PRE: 3.26 ML/(MIN*MMHG*L) (ref 1.96–4.74)
ERVN2 LLN: 0.73
ERVN2 PRE REF: 47.6 %
ERVN2 PRE: 0.35 L (ref 0.73–0.73)
ERVN2 REF: 0.73
ERVN2ULN: 0.73
FEF 25 75 LLN: 0.62
FEF 25 75 PRE REF: 63 %
FEF 25 75 REF: 1.66
FEV05 LLN: 0.66
FEV05 REF: 1.8
FEV1 FVC LLN: 61
FEV1 FVC PRE REF: 97 %
FEV1 FVC REF: 76
FEV1 LLN: 1.55
FEV1 PRE REF: 65.9 %
FEV1 REF: 2.25
FRCN2 LLN: 2.4
FRCN2 PRE REF: 44.9 %
FRCN2 REF: 3.38
FRCN2ULN: 4.37
FVC LLN: 2.15
FVC PRE REF: 67.6 %
FVC REF: 2.99
IVC PRE: 1.83 L (ref 2.15–3.83)
IVC SINGLE BREATH LLN: 2.15
IVC SINGLE BREATH PRE REF: 61.4 %
IVC SINGLE BREATH REF: 2.99
IVCSINGLEBREATHULN: 3.83
PEF LLN: 3.73
PEF PRE REF: 125.6 %
PEF REF: 5.61
PHYSICIAN COMMENT: ABNORMAL
PRE DLCO: 9.67 ML/(MIN*MMHG) (ref 12.18–26.04)
PRE FEF 25 75: 1.05 L/S (ref 0.62–2.7)
PRE FET 100: 5.24 SEC
PRE FEV05 REF: 68.4 %
PRE FEV1 FVC: 73.49 % (ref 60.6–90.95)
PRE FEV1: 1.48 L (ref 1.55–2.95)
PRE FEV5: 1.23 L (ref 0.66–2.93)
PRE FRC N2: 1.52 L
PRE FVC: 2.02 L (ref 2.15–3.83)
PRE PEF: 7.04 L/S (ref 3.73–7.48)
RVN2 LLN: 1.97
RVN2 PRE REF: 44.2 %
RVN2 PRE: 1.17 L (ref 1.97–3.32)
RVN2 REF: 2.65
RVN2TLCN2 LLN: 36.57
RVN2TLCN2 PRE REF: 76.9 %
RVN2TLCN2 PRE: 35.03 % (ref 36.57–54.53)
RVN2TLCN2 REF: 45.55
RVN2TLCN2ULN: 54.53
RVN2ULN: 3.32
TLCN2 LLN: 4.55
TLCN2 PRE REF: 58.6 %
TLCN2 PRE: 3.34 L (ref 4.55–6.86)
TLCN2 REF: 5.7
TLCN2ULN: 6.86
VA PRE: 3.1 L (ref 5.55–5.55)
VA SINGLE BREATH LLN: 5.55
VA SINGLE BREATH PRE REF: 55.9 %
VA SINGLE BREATH REF: 5.55
VASINGLEBREATHULN: 5.55
VCMAXN2 LLN: 2.15
VCMAXN2 PRE REF: 72.6 %
VCMAXN2 PRE: 2.17 L (ref 2.15–3.83)
VCMAXN2 REF: 2.99
VCMAXN2ULN: 3.83

## 2020-01-28 PROCEDURE — 11103 PR TANGENTIAL BIOPSY, SKIN, EA ADDTL LESION: ICD-10-PCS | Mod: S$GLB,,, | Performed by: DERMATOLOGY

## 2020-01-28 PROCEDURE — 99999 PR PBB SHADOW E&M-EST. PATIENT-LVL II: ICD-10-PCS | Mod: PBBFAC,,, | Performed by: DERMATOLOGY

## 2020-01-28 PROCEDURE — 94010 BREATHING CAPACITY TEST: CPT | Mod: S$GLB,,, | Performed by: INTERNAL MEDICINE

## 2020-01-28 PROCEDURE — 88305 TISSUE EXAM BY PATHOLOGIST: CPT | Mod: 26,,, | Performed by: PATHOLOGY

## 2020-01-28 PROCEDURE — 94729 DIFFUSING CAPACITY: CPT | Mod: S$GLB,,, | Performed by: INTERNAL MEDICINE

## 2020-01-28 PROCEDURE — 11102 PR TANGENTIAL BIOPSY, SKIN, SINGLE LESION: ICD-10-PCS | Mod: S$GLB,,, | Performed by: DERMATOLOGY

## 2020-01-28 PROCEDURE — 17003 DESTRUCTION, PREMALIGNANT LESIONS; SECOND THROUGH 14 LESIONS: ICD-10-PCS | Mod: S$GLB,,, | Performed by: DERMATOLOGY

## 2020-01-28 PROCEDURE — 94727 PR PULM FUNCTION TEST BY GAS: ICD-10-PCS | Mod: S$GLB,,, | Performed by: INTERNAL MEDICINE

## 2020-01-28 PROCEDURE — 99499 NO LOS: ICD-10-PCS | Mod: S$GLB,,, | Performed by: DERMATOLOGY

## 2020-01-28 PROCEDURE — 99999 PR PBB SHADOW E&M-EST. PATIENT-LVL II: CPT | Mod: PBBFAC,,, | Performed by: DERMATOLOGY

## 2020-01-28 PROCEDURE — 71046 XR CHEST PA AND LATERAL: ICD-10-PCS | Mod: 26,,, | Performed by: RADIOLOGY

## 2020-01-28 PROCEDURE — 94010 BREATHING CAPACITY TEST: ICD-10-PCS | Mod: S$GLB,,, | Performed by: INTERNAL MEDICINE

## 2020-01-28 PROCEDURE — 99499 UNLISTED E&M SERVICE: CPT | Mod: S$GLB,,, | Performed by: DERMATOLOGY

## 2020-01-28 PROCEDURE — 88305 TISSUE EXAM BY PATHOLOGIST: ICD-10-PCS | Mod: 26,,, | Performed by: PATHOLOGY

## 2020-01-28 PROCEDURE — 11102 TANGNTL BX SKIN SINGLE LES: CPT | Mod: S$GLB,,, | Performed by: DERMATOLOGY

## 2020-01-28 PROCEDURE — 71046 X-RAY EXAM CHEST 2 VIEWS: CPT | Mod: TC,FY

## 2020-01-28 PROCEDURE — 94729 PR C02/MEMBANE DIFFUSE CAPACITY: ICD-10-PCS | Mod: S$GLB,,, | Performed by: INTERNAL MEDICINE

## 2020-01-28 PROCEDURE — 88305 TISSUE EXAM BY PATHOLOGIST: CPT | Mod: 59 | Performed by: PATHOLOGY

## 2020-01-28 PROCEDURE — 94727 GAS DIL/WSHOT DETER LNG VOL: CPT | Mod: S$GLB,,, | Performed by: INTERNAL MEDICINE

## 2020-01-28 PROCEDURE — 17003 DESTRUCT PREMALG LES 2-14: CPT | Mod: S$GLB,,, | Performed by: DERMATOLOGY

## 2020-01-28 PROCEDURE — 17000 PR DESTRUCTION(LASER SURGERY,CRYOSURGERY,CHEMOSURGERY),PREMALIGNANT LESIONS,FIRST LESION: ICD-10-PCS | Mod: 59,S$GLB,, | Performed by: DERMATOLOGY

## 2020-01-28 PROCEDURE — 71046 X-RAY EXAM CHEST 2 VIEWS: CPT | Mod: 26,,, | Performed by: RADIOLOGY

## 2020-01-28 PROCEDURE — 17000 DESTRUCT PREMALG LESION: CPT | Mod: 59,S$GLB,, | Performed by: DERMATOLOGY

## 2020-01-28 PROCEDURE — 11103 TANGNTL BX SKIN EA SEP/ADDL: CPT | Mod: S$GLB,,, | Performed by: DERMATOLOGY

## 2020-01-28 NOTE — PATIENT INSTRUCTIONS
" Shave Biopsy Wound Care    Your doctor has performed a shave biopsy today.  A band aid and vaseline ointment has been placed over the site.  This should remain in place for 24 hours.  It is recommended that you keep the area dry for the first 24 hours.  After 24 hours, you may remove the band aid and wash the area with warm soap and water and apply Vaseline jelly.  Many patients prefer to use Neosporin or Bacitracin ointment.  This is acceptable; however, know that you can develop an allergy to this medication even if you have used it safely for years.  It is important to keep the area moist.  Letting it dry out and get air slows healing time, and will worsen the scar.  Band aid is optional after first 24 hours.      If you notice increasing redness, tenderness, pain, or yellow drainage at the biopsy site, please notify your doctor.  These are signs of an infection.    If your biopsy site is bleeding, apply firm pressure for 15 minutes straight.  Repeat for another 15 minutes, if it is still bleeding.   If the surgical site continues to bleed, then please contact your doctor.      For [facility] users:   You will receive a [facility] notification after the pathologist has finished reviewing your biopsy specimen. Pathology results, however, will not be released online so you will see a "no content" message. Once your dermatologist reviews and clinically correlates your biopsy results, you will either receive a letter in the mail with the results of a phone call from your doctor's office if further explanation or treatment is warranted.       [address] [place] [address]/ [phone] FAX/ [email or web address]      CRYOSURGERY      Your doctor has used a method called cryosurgery to treat your skin condition. Cryosurgery refers to the use of very cold substances to treat a variety of skin conditions such as warts, pre-skin cancers, molluscum contagiosum, sun spots, and several benign " growths. The substance we use in cryosurgery is liquid nitrogen and is so cold (-195 degrees Celsius) that is burns when administered.     Following treatment in the office, the skin may immediately burn and become red. You may find the area around the lesion is affected as well. It is sometimes necessary to treat not only the lesion, but a small area of the surrounding normal skin to achieve a good response.     A blister, and even a blood filled blister, may form after treatment.   This is a normal response. If the blister is painful, it is acceptable to sterilize a needle and with rubbing alcohol and gently pop the blister. It is important that you gently wash the area with soap and warm water as the blister fluid may contain wart virus if a wart was treated. Do no remove the roof of the blister.     The area treated can take anywhere from 1-3 weeks to heal. Healing time depends on the kind skin lesion treated, the location, and how aggressively the lesion was treated. It is recommended that the areas treated are covered with Vaseline or bacitracin ointment and a band-aid. If a band-aid is not practical, just ointment applied several times per day will do. Keeping these areas moist will speed the healing time.    Treatment with liquid nitrogen can leave a scar. In dark skin, it may be a light or dark scar, in light skin it may be a white or pink scar. These will generally fade with time, but may never go away completely.     If you have any concerns after your treatment, please feel free to call the office.       [facility] [place] [address]/ [phone] FAX/ [email or web address]

## 2020-01-28 NOTE — PROGRESS NOTES
Subjective:       Patient ID:  Garry Rooney is a 79 y.o. male who presents for   Chief Complaint   Patient presents with    Biopsy     HPI  Here for 2 biopsies noted at his TBSE last week, as well as LN2 to scalp for AKS.    Review of Systems   Skin: Negative for daily sunscreen use, activity-related sunscreen use and recent sunburn.   Hematologic/Lymphatic: Bruises/bleeds easily.        Objective:    Physical Exam   Constitutional: He appears well-developed and well-nourished.   Neurological: He is alert and oriented to person, place, and time.   Psychiatric: He has a normal mood and affect.   Skin:   Areas Examined (abnormalities noted in diagram):   Scalp / Hair Palpated and Inspected  Chest / Axilla Inspection Performed  LLE Inspection Performed                   Diagram Legend     Erythematous scaling macule/papule c/w actinic keratosis       Vascular papule c/w angioma      Pigmented verrucoid papule/plaque c/w seborrheic keratosis      Yellow umbilicated papule c/w sebaceous hyperplasia      Irregularly shaped tan macule c/w lentigo     1-2 mm smooth white papules consistent with Milia      Movable subcutaneous cyst with punctum c/w epidermal inclusion cyst      Subcutaneous movable cyst c/w pilar cyst      Firm pink to brown papule c/w dermatofibroma      Pedunculated fleshy papule(s) c/w skin tag(s)      Evenly pigmented macule c/w junctional nevus     Mildly variegated pigmented, slightly irregular-bordered macule c/w mildly atypical nevus      Flesh colored to evenly pigmented papule c/w intradermal nevus       Pink pearly papule/plaque c/w basal cell carcinoma      Erythematous hyperkeratotic cursted plaque c/w SCC      Surgical scar with no sign of skin cancer recurrence      Open and closed comedones      Inflammatory papules and pustules      Verrucoid papule consistent consistent with wart     Erythematous eczematous patches and plaques     Dystrophic onycholytic nail with subungual debris c/w  onychomycosis     Umbilicated papule    Erythematous-base heme-crusted tan verrucoid plaque consistent with inflamed seborrheic keratosis     Erythematous Silvery Scaling Plaque c/w Psoriasis     See annotation      Left chest          Left dorsal foot  Assessment / Plan:      Pathology Orders:     Normal Orders This Visit    Specimen to Pathology, Dermatology     Questions:    Procedure Type:  Dermatology and skin neoplasms    Number of Specimens:  2    ------------------------:  -------------------------    Spec 1 Procedure:  Biopsy    Spec 1 Clinical Impression:  blue gray pigmented specks in a 6 mm macule r/o blue nevus vs malignancy    Spec 1 Source:  left chest    ------------------------:  -------------------------    Spec 2 Procedure:  Biopsy    Spec 2 Clinical Impression:  irregular pink brown papule r/o DN vs MM    Spec 2 Source:  left dorsal foot        Neoplasm of uncertain behavior of skin X2  -     Specimen to Pathology, Dermatology  Left chest r/o blue nevus vs MM  Left dorsal foot r/o DN vs MM  Shave biopsy procedure note:    Shave biopsy performed after verbal consent including risk of infection, scar, recurrence, need for additional treatment of site. Area prepped with alcohol, anesthetized with approximately 1.0cc of 1% lidocaine with epinephrine. Lesional tissue shaved with razor blade. Hemostasis achieved with application of aluminum chloride. No complications. Dressing applied. Wound care explained.      Actinic keratosis - scalp  Cryosurgery Procedure Note    Verbal consent from the patient is obtained including, but not limited to, risk of hypopigmentation/hyperpigmentation, scar, recurrence of lesion. The patient is aware of the precancerous quality and need for treatment of these lesions. Liquid nitrogen cryosurgery is applied to the 3 actinic keratoses, as detailed in the physical exam, to produce a freeze injury. The patient is aware that blisters may form and is instructed on wound care  with gentle cleansing and use of vaseline ointment to keep moist until healed. The patient is supplied a handout on cryosurgery and is instructed to call if lesions do not completely resolve.         Follow up for for excision of cyst. offered excision appt for tmw in resident surgery clinic

## 2020-01-29 ENCOUNTER — TELEPHONE (OUTPATIENT)
Dept: GASTROENTEROLOGY | Facility: CLINIC | Age: 80
End: 2020-01-29

## 2020-01-29 DIAGNOSIS — K50.80 CROHN'S DISEASE OF BOTH SMALL AND LARGE INTESTINE WITHOUT COMPLICATION: ICD-10-CM

## 2020-01-29 RX ORDER — AZATHIOPRINE 50 MG/1
250 TABLET ORAL DAILY
Qty: 450 TABLET | Refills: 3 | Status: SHIPPED | OUTPATIENT
Start: 2020-01-29 | End: 2020-02-10 | Stop reason: SDUPTHER

## 2020-01-29 NOTE — TELEPHONE ENCOUNTER
----- Message from Julio César Lackey MD sent at 1/29/2020  4:03 PM CST -----  Contact: pts wife at [phone]  thanks  ----- Message -----  From: Cee Nichols MA  Sent: 1/29/2020   3:53 PM CST  To: Julio César Lackey MD    He is scheduled to see you on 2/10.  ----- Message -----  From: Julio César Lackey MD  Sent: 1/29/2020   3:10 PM CST  To: Cee Nichols MA    Done, he does need f/u appt  ----- Message -----  From: Cee Nichols MA  Sent: 1/29/2020   3:04 PM CST  To: Julio César Lackey MD        ----- Message -----  From: Frannie Saleem  Sent: 1/29/2020   2:53 PM CST  To: Ziyad WATKINS Staff    Rx Refill/Request     Is this a Refill or New Rx:  Refill   Rx Name and Strength:  Azioprine  Qty 450  Preferred Pharmacy with phone number: [facility]   Communication Preference:  Does not have the number but is in his record per Mrs. Rooney.  Will need a 90 day supply with 3 refills.  Additional Information:

## 2020-01-31 ENCOUNTER — TELEPHONE (OUTPATIENT)
Dept: PULMONOLOGY | Facility: CLINIC | Age: 80
End: 2020-01-31

## 2020-01-31 NOTE — TELEPHONE ENCOUNTER
Spoke with both patient and wife about CXR and PFTs. I discussed his PFts showed restriction and moderate decrease in DLCO. I discussed having CT of chest and/or ECHO of heart in relation to his restriction.  Patient expresses he does not want any further imaging or workup for his shortness of breath.  States he is feeling comfortable with using his oxygen and being 79 years old, he would rather just live of his life without further testing.    Mr. Castañeda explains he will call me if he changes his mind.

## 2020-02-04 ENCOUNTER — TELEPHONE (OUTPATIENT)
Dept: PHARMACY | Facility: CLINIC | Age: 80
End: 2020-02-04

## 2020-02-04 NOTE — TELEPHONE ENCOUNTER
Spoke to pt's wife Anne. She requested day supply of Imuran. She confirmed shipping of Imuran (5tabs daily) on  to arrive on . Address and  verified. $45 copay in 004, CCO[facility]. Pt has 0 doses on hand. She was busy admitted into the hospital because she received pacemaker, so has not had a chance to call [facility] to schedule Imuran shipment. She had no further questions or concerns.

## 2020-02-06 ENCOUNTER — PATIENT OUTREACH (OUTPATIENT)
Dept: ADMINISTRATIVE | Facility: OTHER | Age: 80
End: 2020-02-06

## 2020-02-10 ENCOUNTER — OFFICE VISIT (OUTPATIENT)
Dept: GASTROENTEROLOGY | Facility: CLINIC | Age: 80
End: 2020-02-10
Payer: MEDICARE

## 2020-02-10 VITALS
BODY MASS INDEX: 26.24 KG/M2 | SYSTOLIC BLOOD PRESSURE: 127 MMHG | WEIGHT: 153.69 LBS | HEIGHT: 64 IN | DIASTOLIC BLOOD PRESSURE: 73 MMHG | HEART RATE: 86 BPM

## 2020-02-10 DIAGNOSIS — K50.819 CROHN'S DISEASE OF BOTH SMALL AND LARGE INTESTINE WITH COMPLICATION: ICD-10-CM

## 2020-02-10 DIAGNOSIS — Z79.899 ENCOUNTER FOR LONG-TERM (CURRENT) USE OF HIGH-RISK MEDICATION: ICD-10-CM

## 2020-02-10 DIAGNOSIS — F32.5 MAJOR DEPRESSIVE DISORDER WITH SINGLE EPISODE, IN FULL REMISSION: ICD-10-CM

## 2020-02-10 DIAGNOSIS — E87.5 HYPERKALEMIA: Primary | ICD-10-CM

## 2020-02-10 DIAGNOSIS — K50.80 CROHN'S DISEASE OF BOTH SMALL AND LARGE INTESTINE WITHOUT COMPLICATION: Primary | ICD-10-CM

## 2020-02-10 DIAGNOSIS — N40.0 BENIGN PROSTATIC HYPERPLASIA WITHOUT LOWER URINARY TRACT SYMPTOMS: ICD-10-CM

## 2020-02-10 PROCEDURE — 3074F SYST BP LT 130 MM HG: CPT | Mod: CPTII,S$GLB,, | Performed by: INTERNAL MEDICINE

## 2020-02-10 PROCEDURE — 1125F AMNT PAIN NOTED PAIN PRSNT: CPT | Mod: S$GLB,,, | Performed by: INTERNAL MEDICINE

## 2020-02-10 PROCEDURE — 3078F PR MOST RECENT DIASTOLIC BLOOD PRESSURE < 80 MM HG: ICD-10-PCS | Mod: CPTII,S$GLB,, | Performed by: INTERNAL MEDICINE

## 2020-02-10 PROCEDURE — 99215 PR OFFICE/OUTPT VISIT, EST, LEVL V, 40-54 MIN: ICD-10-PCS | Mod: S$GLB,,, | Performed by: INTERNAL MEDICINE

## 2020-02-10 PROCEDURE — 1159F PR MEDICATION LIST DOCUMENTED IN MEDICAL RECORD: ICD-10-PCS | Mod: S$GLB,,, | Performed by: INTERNAL MEDICINE

## 2020-02-10 PROCEDURE — 1159F MED LIST DOCD IN RCRD: CPT | Mod: S$GLB,,, | Performed by: INTERNAL MEDICINE

## 2020-02-10 PROCEDURE — 1101F PR PT FALLS ASSESS DOC 0-1 FALLS W/OUT INJ PAST YR: ICD-10-PCS | Mod: CPTII,S$GLB,, | Performed by: INTERNAL MEDICINE

## 2020-02-10 PROCEDURE — 3078F DIAST BP <80 MM HG: CPT | Mod: CPTII,S$GLB,, | Performed by: INTERNAL MEDICINE

## 2020-02-10 PROCEDURE — 3074F PR MOST RECENT SYSTOLIC BLOOD PRESSURE < 130 MM HG: ICD-10-PCS | Mod: CPTII,S$GLB,, | Performed by: INTERNAL MEDICINE

## 2020-02-10 PROCEDURE — 1101F PT FALLS ASSESS-DOCD LE1/YR: CPT | Mod: CPTII,S$GLB,, | Performed by: INTERNAL MEDICINE

## 2020-02-10 PROCEDURE — 99215 OFFICE O/P EST HI 40 MIN: CPT | Mod: S$GLB,,, | Performed by: INTERNAL MEDICINE

## 2020-02-10 PROCEDURE — 99999 PR PBB SHADOW E&M-EST. PATIENT-LVL III: CPT | Mod: PBBFAC,,, | Performed by: INTERNAL MEDICINE

## 2020-02-10 PROCEDURE — 1125F PR PAIN SEVERITY QUANTIFIED, PAIN PRESENT: ICD-10-PCS | Mod: S$GLB,,, | Performed by: INTERNAL MEDICINE

## 2020-02-10 PROCEDURE — 99999 PR PBB SHADOW E&M-EST. PATIENT-LVL III: ICD-10-PCS | Mod: PBBFAC,,, | Performed by: INTERNAL MEDICINE

## 2020-02-10 RX ORDER — CIPROFLOXACIN 500 MG/1
500 TABLET ORAL 2 TIMES DAILY
Qty: 180 TABLET | Refills: 1 | Status: SHIPPED | OUTPATIENT
Start: 2020-02-10 | End: 2020-06-09 | Stop reason: SDUPTHER

## 2020-02-10 RX ORDER — PAROXETINE HYDROCHLORIDE 20 MG/1
20 TABLET, FILM COATED ORAL EVERY MORNING
Qty: 90 TABLET | Refills: 3 | Status: CANCELLED | OUTPATIENT
Start: 2020-02-10 | End: 2021-02-09

## 2020-02-10 RX ORDER — HYOSCYAMINE SULFATE 0.38 MG/1
0.38 TABLET, EXTENDED RELEASE ORAL EVERY 12 HOURS
Qty: 180 TABLET | Refills: 0 | Status: SHIPPED | OUTPATIENT
Start: 2020-02-10 | End: 2020-03-18

## 2020-02-10 RX ORDER — AZATHIOPRINE 50 MG/1
250 TABLET ORAL DAILY
Qty: 450 TABLET | Refills: 3 | Status: SHIPPED | OUTPATIENT
Start: 2020-02-10 | End: 2020-04-07 | Stop reason: SDUPTHER

## 2020-02-10 RX ORDER — MESALAMINE 800 MG/1
800 TABLET, DELAYED RELEASE ORAL 3 TIMES DAILY
Qty: 270 TABLET | Refills: 3 | Status: SHIPPED | OUTPATIENT
Start: 2020-02-10

## 2020-02-10 NOTE — LETTER
February 10, 2020      Shiv Trejo MD  [address]  [address]  [facility] [address]           [facility]  [address]  [facility] [address]  Phone: [phone]  Fax: [phone]          Patient: Garry Rooney   MR Number: [id number]   YOB: 1940   Date of Visit: 2/10/2020       Dear Dr. Shiv Trejo:    Thank you for referring Garry Rooney to me for evaluation. Attached you will find relevant portions of my assessment and plan of care.    If you have questions, please do not hesitate to call me. I look forward to following Garry Rooney along with you.    Sincerely,    Julio César Lackey MD    Enclosure  CC:  No Recipients    If you would like to receive this communication electronically, please contact [email or web address] or [phone] to request more information on [facility] access.    For providers and/or their staff who would like to refer a patient to [facility], please contact us through our one-stop-shop provider referral line, [facility], at [phone].    If you feel you have received this communication in error or would no longer like to receive these types of communications, please e-mail [email or web address]

## 2020-02-10 NOTE — PROGRESS NOTES
Subjective:       Patient ID: Garry Rooney is a 79 y.o. male.    Chief Complaint: Crohn's Disease    HPI  Review of Systems   Constitutional: Negative for activity change, appetite change, chills, diaphoresis, fatigue, fever and unexpected weight change.   HENT: Negative for congestion, ear pain, mouth sores, rhinorrhea, sinus pressure, sneezing, sore throat, trouble swallowing and voice change.    Eyes: Negative for pain.   Respiratory: Positive for shortness of breath. Negative for cough.    Cardiovascular: Negative for chest pain, palpitations and leg swelling.   Genitourinary: Positive for frequency. Negative for difficulty urinating and flank pain.   Musculoskeletal: Negative for arthralgias, back pain, gait problem, joint swelling, myalgias and neck pain.   Skin: Negative for rash.   Neurological: Negative for dizziness, tremors, syncope, numbness and headaches.   Hematological: Negative for adenopathy. Does not bruise/bleed easily.   Psychiatric/Behavioral: Negative for agitation, behavioral problems, confusion, decreased concentration and dysphoric mood.       Objective:      Physical Exam   Constitutional: He is oriented to person, place, and time. He appears well-developed and well-nourished. No distress.   HENT:   Right Ear: External ear normal.   Left Ear: External ear normal.   Nose: Nose normal.   Mouth/Throat: Oropharynx is clear and moist. No oropharyngeal exudate.   Eyes: Pupils are equal, round, and reactive to light. Conjunctivae are normal. No scleral icterus.   Neck: Normal range of motion. Neck supple. No thyromegaly present.   Cardiovascular: Normal rate, normal heart sounds and intact distal pulses. Exam reveals no gallop.   No murmur heard.  Pulmonary/Chest: Effort normal. He has no wheezes.   decr bs bases   Abdominal: Soft. Normal appearance and bowel sounds are normal. He exhibits no distension, no fluid wave, no ascites and no mass. There is no hepatosplenomegaly. There is no  tenderness. There is no rigidity, no rebound, no guarding and no CVA tenderness.   Genitourinary:   Genitourinary Comments: recent   Musculoskeletal: Normal range of motion. He exhibits no edema or tenderness.   Lymphadenopathy:     He has no cervical adenopathy.   Neurological: He is alert and oriented to person, place, and time. He has normal reflexes. No cranial nerve deficit.   Skin: Skin is warm. No rash noted. He is not diaphoretic.   Psychiatric: He has a normal mood and affect. His behavior is normal. Thought content normal.       Assessment:       1. Crohn's disease of both small and large intestine without complication    2. Crohn's disease of both small and large intestine with complication    3. Encounter for long-term (current) use of high-risk medication        Plan:         Garry is here for a follow-up visit.  He is 79-year-old gentleman with longstanding Crohn's disease. He has had an colonic resection with ileosigmoid colon anastomosis.  He is on aggressive treatment.  He is more complicated than usual because of other issues which include his oxygen dependent pulmonary disease and his residence in [place] for part of the year.    Right now he is doing well we increased his humira to every week over year ago.  He denies any abdominal pain.  He is having formed bowel movements.  He has several per day.  But there is no diarrhea.  No rectal bleeding.  No significant abdominal pain no obstructive symptoms like nausea vomiting or significant distention.    Social history though be returning to [place] in May for his granddaughter's graduation from high school.    Assessment  One Crohn's disease I believe the disease is in remission on our regimen.  I have not done a colonoscopy recently partly because of the risk at his age.  He has significant other issues going on in that with a change in insurance the Humira is going to become very very expensive for him.  He is thinking about  stopping the humira.  We discussed stopping the medicine and what might happen.  It is certainly possible that the azathioprine could maintain his remission status for a while.    Recommendation 1.  Try to find assistance from Humira  2.  Refills for all medicines done  3.  I reviewed labs a recently done    40 min appointment greater half time face-to-face counseling

## 2020-02-11 RX ORDER — FINASTERIDE 5 MG/1
5 TABLET, FILM COATED ORAL DAILY
Qty: 90 TABLET | Refills: 3 | Status: SHIPPED | OUTPATIENT
Start: 2020-02-11 | End: 2021-01-05 | Stop reason: SDUPTHER

## 2020-02-11 NOTE — TELEPHONE ENCOUNTER
----- Message from Bhupendra Rivera sent at 2/11/2020 10:35 AM CST -----  Contact: Anne Rooney (Spouse)  Name of Who is Calling: Anne Rooney (Spouse)      What is the request in detail: Anne Rooney (Spouse) is requesting to renew finasteride 5 mg 1 per day for [facility] . Please contact to further advise.      Can the clinic reply by [facility]: NO      What Number to Call Back [facility]: [phone]

## 2020-02-11 NOTE — TELEPHONE ENCOUNTER
In addition to Ravjadyndran question  Please let pt know recent labs look good. Mild elevation in potassium. We needs to follow up on this with repeat labs in a few weeks to ensure not persistent or worse.

## 2020-02-13 ENCOUNTER — TELEPHONE (OUTPATIENT)
Dept: GASTROENTEROLOGY | Facility: CLINIC | Age: 80
End: 2020-02-13

## 2020-02-13 NOTE — TELEPHONE ENCOUNTER
Spoke with wife.  Aware I will send in a form to the nurse ambassador with Humira.  Aware one of the nurses from this program will be in contact with  to see how they might be able to help with financial assistance.

## 2020-02-13 NOTE — TELEPHONE ENCOUNTER
----- Message from Yamila Da Silva sent at 2/13/2020  2:30 PM CST -----  Mrs. Galicia - could you call pt wife Anne to explain to her about the medication.  Call back # [phone]

## 2020-02-17 ENCOUNTER — TELEPHONE (OUTPATIENT)
Dept: GASTROENTEROLOGY | Facility: CLINIC | Age: 80
End: 2020-02-17

## 2020-02-17 NOTE — TELEPHONE ENCOUNTER
----- Message from Muriel Bee sent at 2/17/2020  3:34 PM CST -----  FYI: Financial Assistance for Humira approved from 2/17/20 to 12/31/20 through [employer].  Patient has been notified of the approval, and will call to schedule a delivery of the medication.     Thank you,  Muriel Bee  Patient Care Advocate  [facility]  Ph: [phone]

## 2020-02-17 NOTE — TELEPHONE ENCOUNTER
FOR DOCUMENTATION ONLY:  Financial Assistance for Humira approved from 2/17/20 to 12/31/20  Source: [employer]  Phone: [phone]  Fax: [phone]

## 2020-02-18 NOTE — PATIENT INSTRUCTIONS
Your test results will be communicated to you via: [facility], Telephone or Letter.  If you have not received your test results within one week. Please contact the clinic.       Yes

## 2020-02-28 ENCOUNTER — TELEPHONE (OUTPATIENT)
Dept: PHARMACY | Facility: CLINIC | Age: 80
End: 2020-02-28
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
## 2020-03-02 ENCOUNTER — TELEPHONE (OUTPATIENT)
Dept: GASTROENTEROLOGY | Facility: CLINIC | Age: 80
End: 2020-03-02

## 2020-03-02 NOTE — TELEPHONE ENCOUNTER
----- Message from Chantell Cormier sent at 3/2/2020  9:16 AM CST -----  Contact: Anne/[phone]  Calling in regards to [place] being out of [place] and being out of Rx ASACOL  mg TbEC . New prescription needed for generic. Email sent yesterday to send in generic to local pharmacy in [facility], due to pharmacy being on back order for brand. Please call      [facility] [employer] [facility] [email or web address] [facility] [address] [facility]  [address] [facility] [address]  Phone: [phone] Fax: [phone]

## 2020-03-12 ENCOUNTER — TELEPHONE (OUTPATIENT)
Dept: DERMATOLOGY | Facility: CLINIC | Age: 80
End: 2020-03-12

## 2020-03-13 LAB
FINAL PATHOLOGIC DIAGNOSIS: NORMAL
GROSS: NORMAL
MICROSCOPIC EXAM: NORMAL

## 2020-03-18 DIAGNOSIS — K50.819 CROHN'S DISEASE OF BOTH SMALL AND LARGE INTESTINE WITH COMPLICATION: ICD-10-CM

## 2020-03-18 DIAGNOSIS — Z79.899 ENCOUNTER FOR LONG-TERM (CURRENT) USE OF HIGH-RISK MEDICATION: ICD-10-CM

## 2020-03-18 RX ORDER — HYOSCYAMINE SULFATE 0.38 MG/1
TABLET, EXTENDED RELEASE ORAL
Qty: 180 TABLET | Refills: 0 | Status: SHIPPED | OUTPATIENT
Start: 2020-03-18 | End: 2020-05-20 | Stop reason: SDUPTHER

## 2020-03-23 ENCOUNTER — TELEPHONE (OUTPATIENT)
Dept: ADMINISTRATIVE | Facility: OTHER | Age: 80
End: 2020-03-23

## 2020-03-25 ENCOUNTER — TELEPHONE (OUTPATIENT)
Dept: ADMINISTRATIVE | Facility: OTHER | Age: 80
End: 2020-03-25

## 2020-04-06 ENCOUNTER — TELEPHONE (OUTPATIENT)
Dept: GASTROENTEROLOGY | Facility: CLINIC | Age: 80
End: 2020-04-06

## 2020-04-06 NOTE — TELEPHONE ENCOUNTER
Spoke with wife.  Aware I received the paperwork for the PA today.  Will work on it and let her know once approved.

## 2020-04-06 NOTE — TELEPHONE ENCOUNTER
----- Message from Yamila Da Silva sent at 4/6/2020  9:37 AM CDT -----  Call stated they need prior auth for mesalamine (ASACOL HD) 800 mg TbEC.  Call back [phone]

## 2020-04-07 ENCOUNTER — TELEPHONE (OUTPATIENT)
Dept: GASTROENTEROLOGY | Facility: CLINIC | Age: 80
End: 2020-04-07

## 2020-04-07 DIAGNOSIS — K50.80 CROHN'S DISEASE OF BOTH SMALL AND LARGE INTESTINE WITHOUT COMPLICATION: ICD-10-CM

## 2020-04-07 RX ORDER — AZATHIOPRINE 50 MG/1
250 TABLET ORAL DAILY
Qty: 450 TABLET | Refills: 3 | Status: SHIPPED | OUTPATIENT
Start: 2020-04-07 | End: 2020-04-13 | Stop reason: SDUPTHER

## 2020-04-07 RX ORDER — VALSARTAN 40 MG/1
40 TABLET ORAL DAILY
Qty: 90 TABLET | Refills: 3 | Status: SHIPPED | OUTPATIENT
Start: 2020-04-07 | End: 2021-01-05 | Stop reason: SDUPTHER

## 2020-04-07 NOTE — TELEPHONE ENCOUNTER
----- Message from Mariel Christy sent at 4/7/2020  9:36 AM CDT -----  Contact: pt wife#[phone]  Returning call from Frannie

## 2020-04-07 NOTE — TELEPHONE ENCOUNTER
Spoke with pharmacist at [facility] in [facility] at [phone].  P/Dr.James Lackey Rx for Balsalazide 750 mg, #180, two by mouth three times a day, with 11 refills called in.  Pharmacist repeated Rx back correctly.

## 2020-04-13 DIAGNOSIS — K50.80 CROHN'S DISEASE OF BOTH SMALL AND LARGE INTESTINE WITHOUT COMPLICATION: ICD-10-CM

## 2020-04-13 DIAGNOSIS — F32.5 MAJOR DEPRESSIVE DISORDER WITH SINGLE EPISODE, IN FULL REMISSION: ICD-10-CM

## 2020-04-13 RX ORDER — AZATHIOPRINE 50 MG/1
250 TABLET ORAL DAILY
Qty: 450 TABLET | Refills: 3 | Status: SHIPPED | OUTPATIENT
Start: 2020-04-13 | End: 2020-04-14 | Stop reason: SDUPTHER

## 2020-04-13 NOTE — TELEPHONE ENCOUNTER
----- Message from Frankie Lackey sent at 4/13/2020  9:25 AM CDT -----  Contact: paroxetine (PAXIL) 20 MG tablet  Can the clinic reply in [facility]:     Please refill the medication(s) listed below. The patient can be reached at this phone number once it is called into the pharmacy. [phone]    Medication #1venlafaxine (EFFEXOR-XR) 75 MG 24 hr capsule 2 a day     Medication #2 paroxetine (PAXIL) 20 MG tablet insurance wont cover ......     Preferred Pharmacy: [facility] - [place], [place] - [address]

## 2020-04-14 RX ORDER — AZATHIOPRINE 50 MG/1
250 TABLET ORAL DAILY
Qty: 450 TABLET | Refills: 3 | Status: SHIPPED | OUTPATIENT
Start: 2020-04-14 | End: 2020-04-15 | Stop reason: CLARIF

## 2020-04-14 RX ORDER — PAROXETINE HYDROCHLORIDE 20 MG/1
20 TABLET, FILM COATED ORAL EVERY MORNING
Qty: 90 TABLET | Refills: 0 | Status: SHIPPED | OUTPATIENT
Start: 2020-04-14 | End: 2020-04-16

## 2020-04-14 RX ORDER — VENLAFAXINE HYDROCHLORIDE 75 MG/1
CAPSULE, EXTENDED RELEASE ORAL
Qty: 180 CAPSULE | Refills: 0 | Status: CANCELLED
Start: 2020-04-14

## 2020-04-14 RX ORDER — AZATHIOPRINE 50 MG/1
250 TABLET ORAL DAILY
Qty: 450 TABLET | Refills: 3 | Status: CANCELLED | OUTPATIENT
Start: 2020-04-14 | End: 2020-07-13

## 2020-04-14 NOTE — TELEPHONE ENCOUNTER
Please pend imuran and send to his GI specialist who is prescribing this     Refilled paxil   Reviewed chart and plan was to wean effexor and start paxil

## 2020-04-15 ENCOUNTER — TELEPHONE (OUTPATIENT)
Dept: GASTROENTEROLOGY | Facility: CLINIC | Age: 80
End: 2020-04-15

## 2020-04-15 DIAGNOSIS — K50.80 CROHN'S DISEASE OF BOTH SMALL AND LARGE INTESTINE WITHOUT COMPLICATION: ICD-10-CM

## 2020-04-15 RX ORDER — AZATHIOPRINE 50 MG/1
250 TABLET ORAL DAILY
Qty: 450 TABLET | Refills: 3 | Status: SHIPPED | OUTPATIENT
Start: 2020-04-15 | End: 2021-04-29

## 2020-04-15 RX ORDER — AZATHIOPRINE 50 MG/1
250 TABLET ORAL DAILY
Qty: 450 TABLET | Refills: 3 | Status: SHIPPED | OUTPATIENT
Start: 2020-04-15 | End: 2020-04-15 | Stop reason: ALTCHOICE

## 2020-04-15 NOTE — TELEPHONE ENCOUNTER
"----- Message from Julio César Lackey MD sent at 4/15/2020 12:21 PM CDT -----  Regarding: RE: Azathioprine  Should be good now    ----- Message -----  From: Cee Nichols MA  Sent: 4/15/2020  10:18 AM CDT  To: Julio César Lackey MD  Subject: FW: Azathioprine                                     ----- Message -----  From: Dm Mccartney  Sent: 4/15/2020  10:13 AM CDT  To: Julio César Lackey MD, Ziyad WATKINS Staff  Subject: Azathioprine                                     Leydi Lackey and staff,    We received the prescription for Azathioprine. Patient's wife said this should go to [facility] mail order pharmacy for a 90 day supply.    Please send prescription to [facility] - [place], [place] - [address].      To complete this in EPIC  The original order MUST be discontinued and re-typed as a new prescription with the updated pharmacy listed.     I have added [facility] to the patients preferred pharmacies.       Uncheck the box that says "[facility]" AND Check box with [facility] pharmacy.    #please do not  click "reorder" -- as it will continue to route the rx to [facility] even if the pharmacy is changed.     Please opt the patient out of [facility] when the BPA is fired.     Please inform us if there is anything further we can do to assist.     Thank you,  Dm"

## 2020-04-15 NOTE — TELEPHONE ENCOUNTER
Per review of chart and discussion with pcp - plan at last clinic visit was to stop effexor by taking 1/2 tab for 10 days and then stop.     Plan at that time was to start paroxetine.       Please call pt and inquire if this taper was completed and if not then this it what was recommended   - refill for paroxetine sent in     - if pt did not stop effexor and wants to stop paxil instead then please let me or covering MD know so chart and meds can be updated with plan

## 2020-04-15 NOTE — TELEPHONE ENCOUNTER
----- Message from [place] Bowers sent at 4/15/2020  3:08 PM CDT -----  Contact: DALE ARTHUR [[id number]]      Can the clinic reply in [facility]:     Please refill the medication(s) listed below. The patient can be reached at this phone number once it is called into the pharmacy. [phone]     Medication #1venlafaxine (EFFEXOR-XR) 75 MG 24 hr capsule 2 a day     Medication #2     Preferred Pharmacy: [facility] [address] [facility] [address]

## 2020-04-15 NOTE — TELEPHONE ENCOUNTER
"----- Message from Julio César Lackey MD sent at 4/15/2020 10:30 AM CDT -----  Regarding: RE: Azathioprine  I think it has been done  ----- Message -----  From: Cee Nichols MA  Sent: 4/15/2020  10:18 AM CDT  To: Julio César Lackey MD  Subject: FW: Azathioprine                                     ----- Message -----  From: Dm Mccartney  Sent: 4/15/2020  10:13 AM CDT  To: Julio César Lackey MD, Ziyad WATKINS Staff  Subject: Azathioprine                                     Leydi Lackey and staff,    We received the prescription for Azathioprine. Patient's wife said this should go to [facility] mail order pharmacy for a 90 day supply.    Please send prescription to [facility] - [place], [place] - [address].      To complete this in EPIC  The original order MUST be discontinued and re-typed as a new prescription with the updated pharmacy listed.     I have added [facility] to the patients preferred pharmacies.       Uncheck the box that says "[facility]" AND Check box with [facility] pharmacy.    #please do not  click "reorder" -- as it will continue to route the rx to [facility] even if the pharmacy is changed.     Please opt the patient out of [facility] when the BPA is fired.     Please inform us if there is anything further we can do to assist.     Thank you,  Dm"

## 2020-04-16 ENCOUNTER — TELEPHONE (OUTPATIENT)
Dept: PAIN MEDICINE | Facility: CLINIC | Age: 80
End: 2020-04-16

## 2020-04-16 RX ORDER — VENLAFAXINE HYDROCHLORIDE 75 MG/1
CAPSULE, EXTENDED RELEASE ORAL
Qty: 180 CAPSULE | Refills: 3 | Status: SHIPPED | OUTPATIENT
Start: 2020-04-16 | End: 2021-01-05 | Stop reason: SDUPTHER

## 2020-04-16 NOTE — TELEPHONE ENCOUNTER
Patient wife stated the their insurance will not cover the paroxetine and he'll rather continue taking the effexor and need a refill on it.

## 2020-04-16 NOTE — TELEPHONE ENCOUNTER
----- Message from Paula Rao sent at 4/16/2020  3:08 PM CDT -----  Contact: DALE ARTHUR [[id number]]  Who called:DALE ARTHUR [[id number]]    What is the request in detail: Patient is requesting a call back. He would like to know if he can have a paper script [place] to [place] [address]. He was advised that the script can be sent to a specific pharmacy with that zip, however, he decided he would prefer a call back on the matter instead. He would not go into further details.  Please advise.    Can the clinic reply by [facility]? No    Best call back number: [phone]    Additional Information: N/A

## 2020-04-16 NOTE — TELEPHONE ENCOUNTER
"Contacted Mr. Rooney, regarding his request to have his medication mailed to him.     He was informed that he would have to get a [employer] ex account.     Mr. Rooney at this time stated " they want to charge me a 100 dollars to bring me a piece of paper., why can't it be sent through the postal service. "     It was explained that by law, we can't use regular mail delivery to send him a narcotic prescription. It has to be secured and signed for upon delivery to ensure the correct person received it and it is covered by [employer] ex in case it is loss.     Mr. Rooney thanks for the explanation but he will go without the medication.       "

## 2020-05-01 ENCOUNTER — PATIENT OUTREACH (OUTPATIENT)
Dept: ADMINISTRATIVE | Facility: OTHER | Age: 80
End: 2020-05-01

## 2020-05-01 NOTE — PROGRESS NOTES
Chart reviewed.   Immunizations: Triggered Imm Registry     Orders placed: n/a  Upcoming appts to satisfy LIANNE topics: n/a

## 2020-05-04 ENCOUNTER — OFFICE VISIT (OUTPATIENT)
Dept: PAIN MEDICINE | Facility: CLINIC | Age: 80
End: 2020-05-04
Attending: ANESTHESIOLOGY
Payer: MEDICARE

## 2020-05-04 DIAGNOSIS — M79.10 MYALGIA: ICD-10-CM

## 2020-05-04 DIAGNOSIS — M81.8 STEROID-INDUCED OSTEOPOROSIS: ICD-10-CM

## 2020-05-04 DIAGNOSIS — T38.0X5A STEROID-INDUCED OSTEOPOROSIS: ICD-10-CM

## 2020-05-04 DIAGNOSIS — R53.81 PHYSICAL DECONDITIONING: ICD-10-CM

## 2020-05-04 DIAGNOSIS — G89.4 CHRONIC PAIN SYNDROME: Primary | ICD-10-CM

## 2020-05-04 DIAGNOSIS — S22.000A COMPRESSION FRACTURE OF BODY OF THORACIC VERTEBRA: ICD-10-CM

## 2020-05-04 DIAGNOSIS — R09.02 HYPOXEMIA: ICD-10-CM

## 2020-05-04 PROCEDURE — 99442 PR PHYSICIAN TELEPHONE EVALUATION 11-20 MIN: CPT | Mod: 95,GC,, | Performed by: ANESTHESIOLOGY

## 2020-05-04 PROCEDURE — 99442 PR PHYSICIAN TELEPHONE EVALUATION 11-20 MIN: ICD-10-PCS | Mod: 95,GC,, | Performed by: ANESTHESIOLOGY

## 2020-05-04 RX ORDER — OXYCODONE AND ACETAMINOPHEN 10; 325 MG/1; MG/1
1 TABLET ORAL 3 TIMES DAILY PRN
Qty: 90 TABLET | Refills: 0 | Status: SHIPPED | OUTPATIENT
Start: 2020-07-04 | End: 2020-08-03

## 2020-05-04 RX ORDER — OXYCODONE AND ACETAMINOPHEN 10; 325 MG/1; MG/1
1 TABLET ORAL 3 TIMES DAILY PRN
Qty: 90 TABLET | Refills: 0 | Status: SHIPPED | OUTPATIENT
Start: 2020-06-04 | End: 2020-07-04

## 2020-05-04 RX ORDER — OXYCODONE AND ACETAMINOPHEN 10; 325 MG/1; MG/1
1 TABLET ORAL 3 TIMES DAILY PRN
Qty: 90 TABLET | Refills: 0 | Status: SHIPPED | OUTPATIENT
Start: 2020-05-04 | End: 2020-06-03

## 2020-05-04 NOTE — PATIENT INSTRUCTIONS
Exercise guide:    https://order.yaneth.nih.gov/sites/default/files/2018-04/yaneth-[email or web address].pdf    https://ux1xybl.yaneth.nih.gov/workout-videos/
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
## 2020-05-20 DIAGNOSIS — K50.819 CROHN'S DISEASE OF BOTH SMALL AND LARGE INTESTINE WITH COMPLICATION: ICD-10-CM

## 2020-05-20 DIAGNOSIS — Z79.899 ENCOUNTER FOR LONG-TERM (CURRENT) USE OF HIGH-RISK MEDICATION: ICD-10-CM

## 2020-05-20 RX ORDER — HYOSCYAMINE SULFATE 0.38 MG/1
0.38 TABLET, EXTENDED RELEASE ORAL EVERY 12 HOURS
Qty: 180 TABLET | Refills: 3 | Status: SHIPPED | OUTPATIENT
Start: 2020-05-20 | End: 2021-05-16

## 2020-07-14 ENCOUNTER — TELEPHONE (OUTPATIENT)
Dept: PAIN MEDICINE | Facility: CLINIC | Age: 80
End: 2020-07-14

## 2020-07-14 NOTE — TELEPHONE ENCOUNTER
----- Message from Makenna Riley sent at 7/14/2020  9:13 AM CDT -----  Name of Who is Calling: DALE ARTHUR [[id number]]    What is the request in detail: Patient is requesting a telephone visit ....Please contact to further discuss and advise      Can the clinic reply by [facility]: No     What [facility]:  [phone]

## 2020-07-31 ENCOUNTER — PATIENT OUTREACH (OUTPATIENT)
Dept: ADMINISTRATIVE | Facility: OTHER | Age: 80
End: 2020-07-31

## 2020-08-03 ENCOUNTER — OFFICE VISIT (OUTPATIENT)
Dept: PAIN MEDICINE | Facility: CLINIC | Age: 80
End: 2020-08-03
Attending: ANESTHESIOLOGY
Payer: MEDICARE

## 2020-08-03 DIAGNOSIS — T38.0X5A STEROID-INDUCED OSTEOPOROSIS: ICD-10-CM

## 2020-08-03 DIAGNOSIS — S22.000A COMPRESSION FRACTURE OF BODY OF THORACIC VERTEBRA: ICD-10-CM

## 2020-08-03 DIAGNOSIS — G89.4 CHRONIC PAIN SYNDROME: Primary | ICD-10-CM

## 2020-08-03 DIAGNOSIS — M51.36 DDD (DEGENERATIVE DISC DISEASE), LUMBAR: ICD-10-CM

## 2020-08-03 DIAGNOSIS — M81.8 STEROID-INDUCED OSTEOPOROSIS: ICD-10-CM

## 2020-08-03 DIAGNOSIS — M79.10 MYALGIA: ICD-10-CM

## 2020-08-03 DIAGNOSIS — M47.816 LUMBAR SPONDYLOSIS: ICD-10-CM

## 2020-08-03 PROCEDURE — 99442 PR PHYSICIAN TELEPHONE EVALUATION 11-20 MIN: ICD-10-PCS | Mod: 95,,, | Performed by: ANESTHESIOLOGY

## 2020-08-03 PROCEDURE — 99442 PR PHYSICIAN TELEPHONE EVALUATION 11-20 MIN: CPT | Mod: 95,,, | Performed by: ANESTHESIOLOGY

## 2020-08-03 RX ORDER — OXYCODONE AND ACETAMINOPHEN 10; 325 MG/1; MG/1
1 TABLET ORAL 3 TIMES DAILY PRN
Qty: 90 TABLET | Refills: 0 | Status: SHIPPED | OUTPATIENT
Start: 2020-09-03 | End: 2020-10-02

## 2020-08-03 RX ORDER — OXYCODONE AND ACETAMINOPHEN 10; 325 MG/1; MG/1
1 TABLET ORAL 3 TIMES DAILY PRN
Qty: 90 TABLET | Refills: 0 | Status: SHIPPED | OUTPATIENT
Start: 2020-08-03 | End: 2020-09-02

## 2020-08-03 RX ORDER — OXYCODONE AND ACETAMINOPHEN 10; 325 MG/1; MG/1
1 TABLET ORAL 3 TIMES DAILY PRN
Qty: 90 TABLET | Refills: 0 | Status: SHIPPED | OUTPATIENT
Start: 2020-10-03 | End: 2020-11-01
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
## 2020-09-15 ENCOUNTER — TELEPHONE (OUTPATIENT)
Dept: GASTROENTEROLOGY | Facility: CLINIC | Age: 80
End: 2020-09-15

## 2020-09-15 NOTE — TELEPHONE ENCOUNTER
----- Message from Pari Maier sent at 8/3/2020 12:42 PM CDT -----  Pt would like to speak to someone to make an apt please call [phone]

## 2020-09-15 NOTE — TELEPHONE ENCOUNTER
Spoke with Mrs. Rooney.  Patient scheduled to see Dr.James Lackey on 12/21 for 10:30.   Confirmation mailed.   Frida

## 2020-09-23 ENCOUNTER — PATIENT MESSAGE (OUTPATIENT)
Dept: RESEARCH | Facility: OTHER | Age: 80
End: 2020-09-23

## 2020-10-22 ENCOUNTER — TELEPHONE (OUTPATIENT)
Dept: GASTROENTEROLOGY | Facility: CLINIC | Age: 80
End: 2020-10-22

## 2020-10-22 NOTE — TELEPHONE ENCOUNTER
----- Message from Chasidy Hudson sent at 10/22/2020  9:44 AM CDT -----  Contact: Anne (wife)  Pt's wife called to r/s 12/17 appt state they won't be in town for the appt and would like to r/s to a date after 1/19, unable to view schedule         Please contact Anne (wife) [phone]

## 2020-10-22 NOTE — TELEPHONE ENCOUNTER
Spoke with Fanny Nevin.  Aware 's schedule is not opened for January 2021 yet.  I will call her back when it does.   Frida

## 2020-10-30 ENCOUNTER — TELEPHONE (OUTPATIENT)
Dept: PAIN MEDICINE | Facility: CLINIC | Age: 80
End: 2020-10-30

## 2020-10-30 NOTE — TELEPHONE ENCOUNTER
Good Morning,      This is an appointment reminder about your schedule Audio/Virtual Visit with Pain Management Provider Cliff Carrillo MD.   Your appointment is for November 02, 2020 at 1:45pm.   Please log into your [facility] Portal 15mins prior to your appointment to e-precheck, and answer the following questions.  This will notify clinical staff and provider that you are ready to start the Call or Virtual Video Visit.  If you experience any technical issue please contact [phone]         Thank You for entrusting [facility] to handle your care     Pt spouse verbalized understanding and confirmed appt

## 2020-11-01 ENCOUNTER — PATIENT OUTREACH (OUTPATIENT)
Dept: ADMINISTRATIVE | Facility: OTHER | Age: 80
End: 2020-11-01

## 2020-11-01 NOTE — PROGRESS NOTES
Health Maintenance Due   Topic Date Due    Shingles Vaccine (1 of 2) [birth year]/1988    Pneumococcal Vaccine (65+ High/Highest Risk) (2 of 2 - PPSV23) 06/01/2016    Colonoscopy  02/16/2018    Influenza Vaccine (1) 08/01/2020     Updates were requested from care everywhere.  Chart was reviewed for overdue Proactive [facility] Encounters ([facility]) topics (CRS, Breast Cancer Screening, Eye exam)  Health Maintenance has been updated.  LINKS immunization registry triggered.  Immunizations were reconciled.

## 2020-11-02 ENCOUNTER — OFFICE VISIT (OUTPATIENT)
Dept: PAIN MEDICINE | Facility: CLINIC | Age: 80
End: 2020-11-02
Attending: ANESTHESIOLOGY
Payer: MEDICARE

## 2020-11-02 DIAGNOSIS — G89.4 CHRONIC PAIN SYNDROME: Primary | ICD-10-CM

## 2020-11-02 DIAGNOSIS — M51.36 DDD (DEGENERATIVE DISC DISEASE), LUMBAR: ICD-10-CM

## 2020-11-02 DIAGNOSIS — M47.899 FACET SYNDROME: ICD-10-CM

## 2020-11-02 DIAGNOSIS — M79.10 MYALGIA: ICD-10-CM

## 2020-11-02 DIAGNOSIS — M47.816 LUMBAR SPONDYLOSIS: ICD-10-CM

## 2020-11-02 PROBLEM — M51.369 DDD (DEGENERATIVE DISC DISEASE), LUMBAR: Status: ACTIVE | Noted: 2020-11-02

## 2020-11-02 PROCEDURE — 99442 PR PHYSICIAN TELEPHONE EVALUATION 11-20 MIN: ICD-10-PCS | Mod: 95,,, | Performed by: ANESTHESIOLOGY

## 2020-11-02 PROCEDURE — 99442 PR PHYSICIAN TELEPHONE EVALUATION 11-20 MIN: CPT | Mod: 95,,, | Performed by: ANESTHESIOLOGY

## 2020-11-02 RX ORDER — OXYCODONE AND ACETAMINOPHEN 10; 325 MG/1; MG/1
1 TABLET ORAL 3 TIMES DAILY PRN
Qty: 90 TABLET | Refills: 0 | Status: SHIPPED | OUTPATIENT
Start: 2020-12-03 | End: 2021-01-01

## 2020-11-02 RX ORDER — OXYCODONE AND ACETAMINOPHEN 10; 325 MG/1; MG/1
1 TABLET ORAL 3 TIMES DAILY PRN
Qty: 90 TABLET | Refills: 0 | Status: SHIPPED | OUTPATIENT
Start: 2020-11-02 | End: 2020-12-02

## 2020-11-02 RX ORDER — OXYCODONE AND ACETAMINOPHEN 10; 325 MG/1; MG/1
1 TABLET ORAL 3 TIMES DAILY PRN
Qty: 90 TABLET | Refills: 0 | Status: SHIPPED | OUTPATIENT
Start: 2021-01-02 | End: 2021-01-27 | Stop reason: SDUPTHER
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
## 2020-11-18 ENCOUNTER — TELEPHONE (OUTPATIENT)
Dept: GASTROENTEROLOGY | Facility: CLINIC | Age: 80
End: 2020-11-18

## 2020-11-18 NOTE — TELEPHONE ENCOUNTER
Spoke with patient.  Scheduled to see Dr.James Lackey on 1/20/2021 for 10:00.   Confirmation mailed.   Frida

## 2020-11-18 NOTE — TELEPHONE ENCOUNTER
----- Message from Chasidy Hudson sent at 10/22/2020  9:44 AM CDT -----  Contact: Anne (wife)  Pt's wife called to r/s 12/17 appt state they won't be in town for the appt and would like to r/s to a date after 1/19, unable to view schedule         Please contact Anne (wife) [phone]

## 2020-11-20 ENCOUNTER — TELEPHONE (OUTPATIENT)
Dept: PAIN MEDICINE | Facility: CLINIC | Age: 80
End: 2020-11-20

## 2020-11-20 NOTE — TELEPHONE ENCOUNTER
"----- Message from Bing Holbrook sent at 11/20/2020  7:39 AM CST -----  Regarding: Pharmacy Authorization  Name of Who is Calling: [facility]      What is the request in detail:   Pharmacist called requesting authorization of the refill for the medication  oxyCODONE-acetaminophen (PERCOCET)  mg per tablet.  Patient states, "'s signature is different from last time."   Please further advise          Call Back: [facility] - [address]     [phone] (Phone)  [phone] (Fax)                                            "

## 2020-11-20 NOTE — TELEPHONE ENCOUNTER
Staff contacted [facility] and asked if they could fax over patient's prescription for review. Staff provided pharmacist with fax number to office

## 2020-11-20 NOTE — TELEPHONE ENCOUNTER
----- Message from Patience Loramfield sent at 11/20/2020 12:02 PM CST -----  Name of Caller: Rosio     [facility] Name: [facility] [address]    Prescription Name: oxyCODONE-acetaminophen (PERCOCET)  mg per tablet    What do they need to clarify?: Needs to clarify the signature on the prescription.    Best Call Back Number: [phone]    Additional Information:

## 2020-12-23 NOTE — TELEPHONE ENCOUNTER
[id number] - Notified NP Yuni Singh) - Patient's tele orders . Do you want renewed? 36 - NP renewed cardiac orders **tried to obtain patient's daily weight - unable to obtain: bed keeps saying 'error' Refill readiness for Humira confirmed with patient; name/ confirmed; no missed doses; no new medications; no side effects noted; patient will  today.

## 2021-01-05 ENCOUNTER — TELEPHONE (OUTPATIENT)
Dept: PAIN MEDICINE | Facility: CLINIC | Age: 81
End: 2021-01-05

## 2021-01-05 DIAGNOSIS — I10 ESSENTIAL HYPERTENSION: ICD-10-CM

## 2021-01-05 DIAGNOSIS — F32.5 MAJOR DEPRESSIVE DISORDER WITH SINGLE EPISODE, IN FULL REMISSION: Primary | ICD-10-CM

## 2021-01-05 DIAGNOSIS — N40.0 BENIGN PROSTATIC HYPERPLASIA WITHOUT LOWER URINARY TRACT SYMPTOMS: ICD-10-CM

## 2021-01-05 RX ORDER — VENLAFAXINE HYDROCHLORIDE 75 MG/1
CAPSULE, EXTENDED RELEASE ORAL
Qty: 180 CAPSULE | Refills: 3 | Status: SHIPPED | OUTPATIENT
Start: 2021-01-05 | End: 2022-01-08

## 2021-01-05 RX ORDER — FINASTERIDE 5 MG/1
5 TABLET, FILM COATED ORAL DAILY
Qty: 90 TABLET | Refills: 3 | Status: SHIPPED | OUTPATIENT
Start: 2021-01-05 | End: 2022-01-08

## 2021-01-05 RX ORDER — VALSARTAN 40 MG/1
40 TABLET ORAL DAILY
Qty: 90 TABLET | Refills: 3 | Status: SHIPPED | OUTPATIENT
Start: 2021-01-05 | End: 2022-01-08

## 2021-01-26 ENCOUNTER — TELEPHONE (OUTPATIENT)
Dept: PAIN MEDICINE | Facility: CLINIC | Age: 81
End: 2021-01-26

## 2021-01-26 ENCOUNTER — PATIENT OUTREACH (OUTPATIENT)
Dept: ADMINISTRATIVE | Facility: OTHER | Age: 81
End: 2021-01-26

## 2021-01-27 ENCOUNTER — TELEPHONE (OUTPATIENT)
Dept: PAIN MEDICINE | Facility: CLINIC | Age: 81
End: 2021-01-27

## 2021-04-16 ENCOUNTER — PATIENT MESSAGE (OUTPATIENT)
Dept: RESEARCH | Facility: HOSPITAL | Age: 81
End: 2021-04-16

## 2021-04-20 ENCOUNTER — LAB VISIT (OUTPATIENT)
Dept: LAB | Facility: OTHER | Age: 81
End: 2021-04-20
Attending: INTERNAL MEDICINE
Payer: MEDICARE

## 2021-04-20 ENCOUNTER — OFFICE VISIT (OUTPATIENT)
Dept: INTERNAL MEDICINE | Facility: CLINIC | Age: 81
End: 2021-04-20
Attending: INTERNAL MEDICINE
Payer: MEDICARE

## 2021-04-20 VITALS
DIASTOLIC BLOOD PRESSURE: 64 MMHG | WEIGHT: 125.44 LBS | HEIGHT: 64 IN | SYSTOLIC BLOOD PRESSURE: 110 MMHG | OXYGEN SATURATION: 93 % | HEART RATE: 72 BPM | BODY MASS INDEX: 21.42 KG/M2

## 2021-04-20 DIAGNOSIS — R63.4 WEIGHT LOSS: Primary | ICD-10-CM

## 2021-04-20 DIAGNOSIS — I10 ESSENTIAL HYPERTENSION: ICD-10-CM

## 2021-04-20 DIAGNOSIS — R21 RASH AND NONSPECIFIC SKIN ERUPTION: ICD-10-CM

## 2021-04-20 DIAGNOSIS — R79.9 ABNORMAL FINDING OF BLOOD CHEMISTRY, UNSPECIFIED: ICD-10-CM

## 2021-04-20 DIAGNOSIS — C67.9 MALIGNANT NEOPLASM OF URINARY BLADDER, UNSPECIFIED SITE: ICD-10-CM

## 2021-04-20 DIAGNOSIS — N40.0 BENIGN PROSTATIC HYPERPLASIA WITHOUT LOWER URINARY TRACT SYMPTOMS: ICD-10-CM

## 2021-04-20 LAB
ALBUMIN SERPL BCP-MCNC: 3 G/DL (ref 3.5–5.2)
ALP SERPL-CCNC: 126 U/L (ref 55–135)
ALT SERPL W/O P-5'-P-CCNC: 20 U/L (ref 10–44)
ANION GAP SERPL CALC-SCNC: 8 MMOL/L (ref 8–16)
AST SERPL-CCNC: 36 U/L (ref 10–40)
BASOPHILS # BLD AUTO: 0.03 K/UL (ref 0–0.2)
BASOPHILS NFR BLD: 0.5 % (ref 0–1.9)
BILIRUB SERPL-MCNC: 0.4 MG/DL (ref 0.1–1)
BUN SERPL-MCNC: 18 MG/DL (ref 8–23)
CALCIUM SERPL-MCNC: 8.8 MG/DL (ref 8.7–10.5)
CHLORIDE SERPL-SCNC: 106 MMOL/L (ref 95–110)
CO2 SERPL-SCNC: 29 MMOL/L (ref 23–29)
CREAT SERPL-MCNC: 0.7 MG/DL (ref 0.5–1.4)
DIFFERENTIAL METHOD: ABNORMAL
EOSINOPHIL # BLD AUTO: 0.1 K/UL (ref 0–0.5)
EOSINOPHIL NFR BLD: 1.1 % (ref 0–8)
ERYTHROCYTE [DISTWIDTH] IN BLOOD BY AUTOMATED COUNT: 14.9 % (ref 11.5–14.5)
EST. GFR  (AFRICAN AMERICAN): >60 ML/MIN/1.73 M^2
EST. GFR  (NON AFRICAN AMERICAN): >60 ML/MIN/1.73 M^2
ESTIMATED AVG GLUCOSE: 117 MG/DL (ref 68–131)
GLUCOSE SERPL-MCNC: 94 MG/DL (ref 70–110)
HBA1C MFR BLD: 5.7 % (ref 4–5.6)
HCT VFR BLD AUTO: 36.3 % (ref 40–54)
HGB BLD-MCNC: 11.3 G/DL (ref 14–18)
IMM GRANULOCYTES # BLD AUTO: 0.02 K/UL (ref 0–0.04)
IMM GRANULOCYTES NFR BLD AUTO: 0.3 % (ref 0–0.5)
LYMPHOCYTES # BLD AUTO: 1.5 K/UL (ref 1–4.8)
LYMPHOCYTES NFR BLD: 22.5 % (ref 18–48)
MCH RBC QN AUTO: 33.5 PG (ref 27–31)
MCHC RBC AUTO-ENTMCNC: 31.1 G/DL (ref 32–36)
MCV RBC AUTO: 108 FL (ref 82–98)
MONOCYTES # BLD AUTO: 0.9 K/UL (ref 0.3–1)
MONOCYTES NFR BLD: 13.2 % (ref 4–15)
NEUTROPHILS # BLD AUTO: 4.1 K/UL (ref 1.8–7.7)
NEUTROPHILS NFR BLD: 62.4 % (ref 38–73)
NRBC BLD-RTO: 0 /100 WBC
PLATELET # BLD AUTO: 184 K/UL (ref 150–450)
PMV BLD AUTO: 9.9 FL (ref 9.2–12.9)
POTASSIUM SERPL-SCNC: 4.6 MMOL/L (ref 3.5–5.1)
PROT SERPL-MCNC: 7 G/DL (ref 6–8.4)
RBC # BLD AUTO: 3.37 M/UL (ref 4.6–6.2)
SODIUM SERPL-SCNC: 143 MMOL/L (ref 136–145)
TSH SERPL DL<=0.005 MIU/L-ACNC: 0.58 UIU/ML (ref 0.4–4)
WBC # BLD AUTO: 6.57 K/UL (ref 3.9–12.7)

## 2021-04-20 PROCEDURE — 99214 PR OFFICE/OUTPT VISIT, EST, LEVL IV, 30-39 MIN: ICD-10-PCS | Mod: S$GLB,,, | Performed by: INTERNAL MEDICINE

## 2021-04-20 PROCEDURE — 1126F PR PAIN SEVERITY QUANTIFIED, NO PAIN PRESENT: ICD-10-PCS | Mod: S$GLB,,, | Performed by: INTERNAL MEDICINE

## 2021-04-20 PROCEDURE — 99999 PR PBB SHADOW E&M-EST. PATIENT-LVL IV: CPT | Mod: PBBFAC,,, | Performed by: INTERNAL MEDICINE

## 2021-04-20 PROCEDURE — 80061 LIPID PANEL: CPT | Performed by: INTERNAL MEDICINE

## 2021-04-20 PROCEDURE — 99214 OFFICE O/P EST MOD 30 MIN: CPT | Mod: S$GLB,,, | Performed by: INTERNAL MEDICINE

## 2021-04-20 PROCEDURE — 1159F MED LIST DOCD IN RCRD: CPT | Mod: S$GLB,,, | Performed by: INTERNAL MEDICINE

## 2021-04-20 PROCEDURE — 1159F PR MEDICATION LIST DOCUMENTED IN MEDICAL RECORD: ICD-10-PCS | Mod: S$GLB,,, | Performed by: INTERNAL MEDICINE

## 2021-04-20 PROCEDURE — 36415 COLL VENOUS BLD VENIPUNCTURE: CPT | Performed by: INTERNAL MEDICINE

## 2021-04-20 PROCEDURE — 99999 PR PBB SHADOW E&M-EST. PATIENT-LVL IV: ICD-10-PCS | Mod: PBBFAC,,, | Performed by: INTERNAL MEDICINE

## 2021-04-20 PROCEDURE — 83036 HEMOGLOBIN GLYCOSYLATED A1C: CPT | Performed by: INTERNAL MEDICINE

## 2021-04-20 PROCEDURE — 1126F AMNT PAIN NOTED NONE PRSNT: CPT | Mod: S$GLB,,, | Performed by: INTERNAL MEDICINE

## 2021-04-20 PROCEDURE — 85025 COMPLETE CBC W/AUTO DIFF WBC: CPT | Performed by: INTERNAL MEDICINE

## 2021-04-20 PROCEDURE — 80053 COMPREHEN METABOLIC PANEL: CPT | Performed by: INTERNAL MEDICINE

## 2021-04-20 PROCEDURE — 84443 ASSAY THYROID STIM HORMONE: CPT | Performed by: INTERNAL MEDICINE

## 2021-04-21 ENCOUNTER — TELEPHONE (OUTPATIENT)
Dept: INTERNAL MEDICINE | Facility: CLINIC | Age: 81
End: 2021-04-21

## 2021-04-21 LAB
CHOLEST SERPL-MCNC: 124 MG/DL (ref 120–199)
CHOLEST/HDLC SERPL: 2.5 {RATIO} (ref 2–5)
HDLC SERPL-MCNC: 50 MG/DL (ref 40–75)
HDLC SERPL: 40.3 % (ref 20–50)
LDLC SERPL CALC-MCNC: 62 MG/DL (ref 63–159)
NONHDLC SERPL-MCNC: 74 MG/DL
TRIGL SERPL-MCNC: 60 MG/DL (ref 30–150)

## 2021-04-28 ENCOUNTER — OFFICE VISIT (OUTPATIENT)
Dept: PAIN MEDICINE | Facility: CLINIC | Age: 81
End: 2021-04-28
Payer: MEDICARE

## 2021-04-28 ENCOUNTER — OFFICE VISIT (OUTPATIENT)
Dept: GASTROENTEROLOGY | Facility: CLINIC | Age: 81
End: 2021-04-28
Payer: MEDICARE

## 2021-04-28 VITALS
BODY MASS INDEX: 22.89 KG/M2 | RESPIRATION RATE: 18 BRPM | SYSTOLIC BLOOD PRESSURE: 131 MMHG | HEIGHT: 64 IN | DIASTOLIC BLOOD PRESSURE: 76 MMHG | HEART RATE: 89 BPM | OXYGEN SATURATION: 99 % | WEIGHT: 134.06 LBS

## 2021-04-28 VITALS
DIASTOLIC BLOOD PRESSURE: 76 MMHG | BODY MASS INDEX: 21.61 KG/M2 | HEART RATE: 86 BPM | WEIGHT: 126.56 LBS | HEIGHT: 64 IN | SYSTOLIC BLOOD PRESSURE: 127 MMHG

## 2021-04-28 DIAGNOSIS — G89.4 CHRONIC PAIN SYNDROME: Primary | ICD-10-CM

## 2021-04-28 DIAGNOSIS — M81.8 STEROID-INDUCED OSTEOPOROSIS: ICD-10-CM

## 2021-04-28 DIAGNOSIS — K50.80 CROHN'S DISEASE OF BOTH SMALL AND LARGE INTESTINE WITHOUT COMPLICATION: Primary | ICD-10-CM

## 2021-04-28 DIAGNOSIS — T38.0X5A STEROID-INDUCED OSTEOPOROSIS: ICD-10-CM

## 2021-04-28 DIAGNOSIS — R53.81 PHYSICAL DECONDITIONING: ICD-10-CM

## 2021-04-28 DIAGNOSIS — M47.899 FACET SYNDROME: ICD-10-CM

## 2021-04-28 DIAGNOSIS — M47.816 LUMBAR SPONDYLOSIS: ICD-10-CM

## 2021-04-28 DIAGNOSIS — S22.000A COMPRESSION FRACTURE OF BODY OF THORACIC VERTEBRA: ICD-10-CM

## 2021-04-28 PROCEDURE — 1101F PR PT FALLS ASSESS DOC 0-1 FALLS W/OUT INJ PAST YR: ICD-10-PCS | Mod: CPTII,S$GLB,, | Performed by: ANESTHESIOLOGY

## 2021-04-28 PROCEDURE — 1159F MED LIST DOCD IN RCRD: CPT | Mod: S$GLB,,, | Performed by: ANESTHESIOLOGY

## 2021-04-28 PROCEDURE — 99214 OFFICE O/P EST MOD 30 MIN: CPT | Mod: GC,S$GLB,, | Performed by: ANESTHESIOLOGY

## 2021-04-28 PROCEDURE — 3288F PR FALLS RISK ASSESSMENT DOCUMENTED: ICD-10-PCS | Mod: CPTII,S$GLB,, | Performed by: ANESTHESIOLOGY

## 2021-04-28 PROCEDURE — 1159F MED LIST DOCD IN RCRD: CPT | Mod: S$GLB,,, | Performed by: INTERNAL MEDICINE

## 2021-04-28 PROCEDURE — 99999 PR PBB SHADOW E&M-EST. PATIENT-LVL IV: ICD-10-PCS | Mod: PBBFAC,,, | Performed by: ANESTHESIOLOGY

## 2021-04-28 PROCEDURE — 1159F PR MEDICATION LIST DOCUMENTED IN MEDICAL RECORD: ICD-10-PCS | Mod: S$GLB,,, | Performed by: INTERNAL MEDICINE

## 2021-04-28 PROCEDURE — 99999 PR PBB SHADOW E&M-EST. PATIENT-LVL IV: CPT | Mod: PBBFAC,,, | Performed by: INTERNAL MEDICINE

## 2021-04-28 PROCEDURE — 3288F FALL RISK ASSESSMENT DOCD: CPT | Mod: CPTII,S$GLB,, | Performed by: INTERNAL MEDICINE

## 2021-04-28 PROCEDURE — 1125F AMNT PAIN NOTED PAIN PRSNT: CPT | Mod: S$GLB,,, | Performed by: ANESTHESIOLOGY

## 2021-04-28 PROCEDURE — 1101F PT FALLS ASSESS-DOCD LE1/YR: CPT | Mod: CPTII,S$GLB,, | Performed by: ANESTHESIOLOGY

## 2021-04-28 PROCEDURE — 3288F PR FALLS RISK ASSESSMENT DOCUMENTED: ICD-10-PCS | Mod: CPTII,S$GLB,, | Performed by: INTERNAL MEDICINE

## 2021-04-28 PROCEDURE — 1125F PR PAIN SEVERITY QUANTIFIED, PAIN PRESENT: ICD-10-PCS | Mod: S$GLB,,, | Performed by: INTERNAL MEDICINE

## 2021-04-28 PROCEDURE — 99214 PR OFFICE/OUTPT VISIT, EST, LEVL IV, 30-39 MIN: ICD-10-PCS | Mod: GC,S$GLB,, | Performed by: ANESTHESIOLOGY

## 2021-04-28 PROCEDURE — 1101F PR PT FALLS ASSESS DOC 0-1 FALLS W/OUT INJ PAST YR: ICD-10-PCS | Mod: CPTII,S$GLB,, | Performed by: INTERNAL MEDICINE

## 2021-04-28 PROCEDURE — 1101F PT FALLS ASSESS-DOCD LE1/YR: CPT | Mod: CPTII,S$GLB,, | Performed by: INTERNAL MEDICINE

## 2021-04-28 PROCEDURE — 99999 PR PBB SHADOW E&M-EST. PATIENT-LVL IV: ICD-10-PCS | Mod: PBBFAC,,, | Performed by: INTERNAL MEDICINE

## 2021-04-28 PROCEDURE — 1159F PR MEDICATION LIST DOCUMENTED IN MEDICAL RECORD: ICD-10-PCS | Mod: S$GLB,,, | Performed by: ANESTHESIOLOGY

## 2021-04-28 PROCEDURE — 1125F PR PAIN SEVERITY QUANTIFIED, PAIN PRESENT: ICD-10-PCS | Mod: S$GLB,,, | Performed by: ANESTHESIOLOGY

## 2021-04-28 PROCEDURE — 3288F FALL RISK ASSESSMENT DOCD: CPT | Mod: CPTII,S$GLB,, | Performed by: ANESTHESIOLOGY

## 2021-04-28 PROCEDURE — 99215 OFFICE O/P EST HI 40 MIN: CPT | Mod: S$GLB,,, | Performed by: INTERNAL MEDICINE

## 2021-04-28 PROCEDURE — 1125F AMNT PAIN NOTED PAIN PRSNT: CPT | Mod: S$GLB,,, | Performed by: INTERNAL MEDICINE

## 2021-04-28 PROCEDURE — 99215 PR OFFICE/OUTPT VISIT, EST, LEVL V, 40-54 MIN: ICD-10-PCS | Mod: S$GLB,,, | Performed by: INTERNAL MEDICINE

## 2021-04-28 PROCEDURE — 99999 PR PBB SHADOW E&M-EST. PATIENT-LVL IV: CPT | Mod: PBBFAC,,, | Performed by: ANESTHESIOLOGY

## 2021-04-28 RX ORDER — OXYCODONE AND ACETAMINOPHEN 10; 325 MG/1; MG/1
1 TABLET ORAL 3 TIMES DAILY PRN
Qty: 90 TABLET | Refills: 0 | Status: SHIPPED | OUTPATIENT
Start: 2021-04-28 | End: 2021-04-28 | Stop reason: SDUPTHER

## 2021-04-28 RX ORDER — OXYCODONE AND ACETAMINOPHEN 10; 325 MG/1; MG/1
1 TABLET ORAL 3 TIMES DAILY PRN
Qty: 90 TABLET | Refills: 0 | Status: SHIPPED | OUTPATIENT
Start: 2021-05-29 | End: 2021-06-28

## 2021-04-28 RX ORDER — OXYCODONE AND ACETAMINOPHEN 10; 325 MG/1; MG/1
1 TABLET ORAL 3 TIMES DAILY PRN
Qty: 90 TABLET | Refills: 0 | Status: SHIPPED | OUTPATIENT
Start: 2021-06-28 | End: 2021-07-28

## 2021-04-28 RX ORDER — OXYCODONE AND ACETAMINOPHEN 10; 325 MG/1; MG/1
1 TABLET ORAL 3 TIMES DAILY PRN
Qty: 90 TABLET | Refills: 0 | Status: SHIPPED | OUTPATIENT
Start: 2021-06-28 | End: 2021-04-28 | Stop reason: SDUPTHER

## 2021-04-28 RX ORDER — OXYCODONE AND ACETAMINOPHEN 10; 325 MG/1; MG/1
1 TABLET ORAL 3 TIMES DAILY PRN
Qty: 90 TABLET | Refills: 0 | Status: SHIPPED | OUTPATIENT
Start: 2021-05-29 | End: 2021-04-28 | Stop reason: SDUPTHER

## 2021-04-28 RX ORDER — OXYCODONE AND ACETAMINOPHEN 10; 325 MG/1; MG/1
1 TABLET ORAL 3 TIMES DAILY PRN
Qty: 90 TABLET | Refills: 0 | Status: SHIPPED | OUTPATIENT
Start: 2021-04-28 | End: 2021-05-28

## 2021-04-29 ENCOUNTER — PATIENT MESSAGE (OUTPATIENT)
Dept: GASTROENTEROLOGY | Facility: CLINIC | Age: 81
End: 2021-04-29

## 2021-04-29 ENCOUNTER — TELEPHONE (OUTPATIENT)
Dept: GASTROENTEROLOGY | Facility: CLINIC | Age: 81
End: 2021-04-29

## 2021-04-29 RX ORDER — AZATHIOPRINE 50 MG/1
TABLET ORAL
Qty: 450 TABLET | Refills: 3 | Status: SHIPPED | OUTPATIENT
Start: 2021-04-29 | End: 2022-03-14

## 2021-05-01 ENCOUNTER — PATIENT MESSAGE (OUTPATIENT)
Dept: INTERNAL MEDICINE | Facility: CLINIC | Age: 81
End: 2021-05-01

## 2021-05-01 ENCOUNTER — LAB VISIT (OUTPATIENT)
Dept: LAB | Facility: HOSPITAL | Age: 81
End: 2021-05-01
Attending: INTERNAL MEDICINE
Payer: MEDICARE

## 2021-05-01 DIAGNOSIS — K50.80 CROHN'S DISEASE OF BOTH SMALL AND LARGE INTESTINE WITHOUT COMPLICATION: ICD-10-CM

## 2021-05-01 DIAGNOSIS — Z82.49 FAMILY HISTORY OF HEART DISEASE: Primary | ICD-10-CM

## 2021-05-01 LAB — CRP SERPL-MCNC: 7.8 MG/L (ref 0–8.2)

## 2021-05-01 PROCEDURE — 86140 C-REACTIVE PROTEIN: CPT | Performed by: INTERNAL MEDICINE

## 2021-05-01 PROCEDURE — 80145 DRUG ASSAY ADALIMUMAB: CPT | Performed by: INTERNAL MEDICINE

## 2021-05-03 PROBLEM — R63.4 WEIGHT LOSS: Status: ACTIVE | Noted: 2021-05-03

## 2021-05-04 ENCOUNTER — HOSPITAL ENCOUNTER (OUTPATIENT)
Dept: RADIOLOGY | Facility: HOSPITAL | Age: 81
Discharge: HOME | End: 2021-05-04
Attending: INTERNAL MEDICINE
Payer: MEDICARE

## 2021-05-04 DIAGNOSIS — K50.80 CROHN'S DISEASE OF BOTH SMALL AND LARGE INTESTINE WITHOUT COMPLICATION: ICD-10-CM

## 2021-05-04 PROCEDURE — 74177 CT ABD & PELVIS W/CONTRAST: CPT | Mod: TC

## 2021-05-04 PROCEDURE — 25500020 PHARM REV CODE 255: Performed by: INTERNAL MEDICINE

## 2021-05-04 PROCEDURE — 74177 CT ABD & PELVIS W/CONTRAST: CPT | Mod: 26,,, | Performed by: RADIOLOGY

## 2021-05-04 PROCEDURE — 74177 CT ENTEROGRAPHY ABD_PELVIS WITH CONTRAST: ICD-10-PCS | Mod: 26,,, | Performed by: RADIOLOGY

## 2021-05-04 PROCEDURE — A9698 NON-RAD CONTRAST MATERIALNOC: HCPCS | Performed by: INTERNAL MEDICINE

## 2021-05-04 RX ADMIN — BARIUM SULFATE 225 ML: 1 SUSPENSION ORAL at 03:05

## 2021-05-04 RX ADMIN — BARIUM SULFATE 450 ML: 1 SUSPENSION ORAL at 03:05

## 2021-05-04 RX ADMIN — IOHEXOL 100 ML: 350 INJECTION, SOLUTION INTRAVENOUS at 03:05

## 2021-05-10 ENCOUNTER — PATIENT MESSAGE (OUTPATIENT)
Dept: GASTROENTEROLOGY | Facility: CLINIC | Age: 81
End: 2021-05-10

## 2021-05-10 LAB — ADALIMUMAB CONCENTRATION & ANTI-ADALIMUMAB ANTIBODY: NORMAL

## 2021-05-11 ENCOUNTER — TELEPHONE (OUTPATIENT)
Dept: GASTROENTEROLOGY | Facility: CLINIC | Age: 81
End: 2021-05-11

## 2021-05-11 ENCOUNTER — PATIENT MESSAGE (OUTPATIENT)
Dept: GASTROENTEROLOGY | Facility: CLINIC | Age: 81
End: 2021-05-11

## 2021-05-13 ENCOUNTER — PATIENT MESSAGE (OUTPATIENT)
Dept: GASTROENTEROLOGY | Facility: CLINIC | Age: 81
End: 2021-05-13

## 2021-05-15 ENCOUNTER — PATIENT MESSAGE (OUTPATIENT)
Dept: GASTROENTEROLOGY | Facility: CLINIC | Age: 81
End: 2021-05-15

## 2021-05-16 RX ORDER — BALSALAZIDE DISODIUM 750 MG/1
1500 CAPSULE ORAL 3 TIMES DAILY
Qty: 540 CAPSULE | Refills: 3 | Status: SHIPPED | OUTPATIENT
Start: 2021-05-16 | End: 2022-04-29

## 2021-05-18 ENCOUNTER — TELEPHONE (OUTPATIENT)
Dept: INTERNAL MEDICINE | Facility: CLINIC | Age: 81
End: 2021-05-18

## 2021-05-18 DIAGNOSIS — Z20.822 ENCOUNTER FOR LABORATORY TESTING FOR COVID-19 VIRUS: Primary | ICD-10-CM

## 2021-05-24 ENCOUNTER — PATIENT MESSAGE (OUTPATIENT)
Dept: GASTROENTEROLOGY | Facility: CLINIC | Age: 81
End: 2021-05-24

## 2021-05-24 ENCOUNTER — LAB VISIT (OUTPATIENT)
Dept: INTERNAL MEDICINE | Facility: CLINIC | Age: 81
End: 2021-05-24
Payer: MEDICARE

## 2021-05-24 DIAGNOSIS — Z20.822 ENCOUNTER FOR LABORATORY TESTING FOR COVID-19 VIRUS: ICD-10-CM

## 2021-05-24 PROCEDURE — U0005 INFEC AGEN DETEC AMPLI PROBE: HCPCS | Performed by: INTERNAL MEDICINE

## 2021-05-24 PROCEDURE — U0003 INFECTIOUS AGENT DETECTION BY NUCLEIC ACID (DNA OR RNA); SEVERE ACUTE RESPIRATORY SYNDROME CORONAVIRUS 2 (SARS-COV-2) (CORONAVIRUS DISEASE [COVID-19]), AMPLIFIED PROBE TECHNIQUE, MAKING USE OF HIGH THROUGHPUT TECHNOLOGIES AS DESCRIBED BY CMS-2020-01-R: HCPCS | Performed by: INTERNAL MEDICINE

## 2021-05-25 LAB — SARS-COV-2 RNA RESP QL NAA+PROBE: NOT DETECTED

## 2021-06-09 ENCOUNTER — PATIENT MESSAGE (OUTPATIENT)
Dept: INTERNAL MEDICINE | Facility: CLINIC | Age: 81
End: 2021-06-09

## 2021-06-28 ENCOUNTER — PATIENT MESSAGE (OUTPATIENT)
Dept: INTERNAL MEDICINE | Facility: CLINIC | Age: 81
End: 2021-06-28

## 2021-08-15 ENCOUNTER — PATIENT MESSAGE (OUTPATIENT)
Dept: GASTROENTEROLOGY | Facility: CLINIC | Age: 81
End: 2021-08-15

## 2021-08-26 ENCOUNTER — PATIENT MESSAGE (OUTPATIENT)
Dept: GASTROENTEROLOGY | Facility: CLINIC | Age: 81
End: 2021-08-26

## 2021-09-08 ENCOUNTER — PATIENT MESSAGE (OUTPATIENT)
Dept: GASTROENTEROLOGY | Facility: CLINIC | Age: 81
End: 2021-09-08

## 2021-09-22 ENCOUNTER — PATIENT MESSAGE (OUTPATIENT)
Dept: GASTROENTEROLOGY | Facility: CLINIC | Age: 81
End: 2021-09-22

## 2021-10-06 ENCOUNTER — PATIENT MESSAGE (OUTPATIENT)
Dept: GASTROENTEROLOGY | Facility: CLINIC | Age: 81
End: 2021-10-06

## 2021-10-27 ENCOUNTER — TELEPHONE (OUTPATIENT)
Dept: PAIN MEDICINE | Facility: CLINIC | Age: 81
End: 2021-10-27
Payer: MEDICARE

## 2021-10-28 ENCOUNTER — OFFICE VISIT (OUTPATIENT)
Dept: PAIN MEDICINE | Facility: CLINIC | Age: 81
End: 2021-10-28
Payer: MEDICARE

## 2021-10-28 DIAGNOSIS — M51.36 DDD (DEGENERATIVE DISC DISEASE), LUMBAR: ICD-10-CM

## 2021-10-28 DIAGNOSIS — M79.10 MYALGIA: ICD-10-CM

## 2021-10-28 DIAGNOSIS — G89.4 CHRONIC PAIN SYNDROME: Primary | ICD-10-CM

## 2021-10-28 DIAGNOSIS — S22.000A COMPRESSION FRACTURE OF BODY OF THORACIC VERTEBRA: ICD-10-CM

## 2021-10-28 PROCEDURE — 99214 OFFICE O/P EST MOD 30 MIN: CPT | Mod: 95,GC,, | Performed by: ANESTHESIOLOGY

## 2021-10-28 PROCEDURE — 99214 PR OFFICE/OUTPT VISIT, EST, LEVL IV, 30-39 MIN: ICD-10-PCS | Mod: 95,GC,, | Performed by: ANESTHESIOLOGY

## 2021-10-28 RX ORDER — OXYCODONE AND ACETAMINOPHEN 10; 325 MG/1; MG/1
1 TABLET ORAL EVERY 8 HOURS PRN
Qty: 90 TABLET | Refills: 0 | Status: SHIPPED | OUTPATIENT
Start: 2021-10-28 | End: 2021-10-29 | Stop reason: SDUPTHER

## 2021-10-29 ENCOUNTER — PATIENT MESSAGE (OUTPATIENT)
Dept: PAIN MEDICINE | Facility: CLINIC | Age: 81
End: 2021-10-29
Payer: MEDICARE

## 2021-11-11 ENCOUNTER — PATIENT MESSAGE (OUTPATIENT)
Dept: PAIN MEDICINE | Facility: CLINIC | Age: 81
End: 2021-11-11
Payer: MEDICARE

## 2021-12-10 ENCOUNTER — PATIENT MESSAGE (OUTPATIENT)
Dept: PAIN MEDICINE | Facility: CLINIC | Age: 81
End: 2021-12-10
Payer: MEDICARE

## 2021-12-10 DIAGNOSIS — G89.4 CHRONIC PAIN SYNDROME: ICD-10-CM

## 2021-12-10 DIAGNOSIS — M79.10 MYALGIA: ICD-10-CM

## 2021-12-10 DIAGNOSIS — S22.000A COMPRESSION FRACTURE OF BODY OF THORACIC VERTEBRA: ICD-10-CM

## 2021-12-10 DIAGNOSIS — M51.36 DDD (DEGENERATIVE DISC DISEASE), LUMBAR: ICD-10-CM

## 2021-12-10 RX ORDER — OXYCODONE AND ACETAMINOPHEN 10; 325 MG/1; MG/1
1 TABLET ORAL EVERY 8 HOURS PRN
Qty: 90 TABLET | Refills: 0 | Status: SHIPPED | OUTPATIENT
Start: 2022-01-08 | End: 2022-02-07

## 2021-12-10 RX ORDER — OXYCODONE AND ACETAMINOPHEN 10; 325 MG/1; MG/1
1 TABLET ORAL EVERY 8 HOURS PRN
Qty: 90 TABLET | Refills: 0 | Status: SHIPPED | OUTPATIENT
Start: 2022-02-07 | End: 2021-12-10 | Stop reason: SDUPTHER

## 2021-12-10 RX ORDER — OXYCODONE AND ACETAMINOPHEN 10; 325 MG/1; MG/1
1 TABLET ORAL EVERY 8 HOURS PRN
Qty: 90 TABLET | Refills: 0 | Status: SHIPPED | OUTPATIENT
Start: 2021-12-10 | End: 2022-01-09

## 2021-12-10 RX ORDER — OXYCODONE AND ACETAMINOPHEN 10; 325 MG/1; MG/1
1 TABLET ORAL EVERY 8 HOURS PRN
Qty: 90 TABLET | Refills: 0 | Status: SHIPPED | OUTPATIENT
Start: 2021-12-10 | End: 2021-12-10 | Stop reason: SDUPTHER

## 2021-12-21 ENCOUNTER — PATIENT MESSAGE (OUTPATIENT)
Dept: GASTROENTEROLOGY | Facility: CLINIC | Age: 81
End: 2021-12-21
Payer: MEDICARE

## 2021-12-21 RX ORDER — ADALIMUMAB 40MG/0.8ML
KIT SUBCUTANEOUS
Qty: 4 PEN | Refills: 6 | OUTPATIENT
Start: 2021-12-21 | End: 2021-12-21 | Stop reason: SDUPTHER

## 2021-12-21 RX ORDER — ADALIMUMAB 40MG/0.8ML
KIT SUBCUTANEOUS
Qty: 4 PEN | Refills: 6 | Status: SHIPPED | OUTPATIENT
Start: 2021-12-21 | End: 2022-02-21 | Stop reason: SDUPTHER

## 2021-12-22 ENCOUNTER — PATIENT MESSAGE (OUTPATIENT)
Dept: PAIN MEDICINE | Facility: CLINIC | Age: 81
End: 2021-12-22
Payer: MEDICARE

## 2022-01-06 DIAGNOSIS — I10 ESSENTIAL HYPERTENSION: ICD-10-CM

## 2022-01-06 DIAGNOSIS — F32.5 MAJOR DEPRESSIVE DISORDER WITH SINGLE EPISODE, IN FULL REMISSION: ICD-10-CM

## 2022-01-06 DIAGNOSIS — N40.0 BENIGN PROSTATIC HYPERPLASIA WITHOUT LOWER URINARY TRACT SYMPTOMS: ICD-10-CM

## 2022-01-06 NOTE — TELEPHONE ENCOUNTER
No new care gaps identified.  Powered by [employer]. Reference number: [id number].   1/06/2022 3:56:22 PM CST

## 2022-01-08 RX ORDER — VALSARTAN 40 MG/1
TABLET ORAL
Qty: 90 TABLET | Refills: 1 | Status: SHIPPED | OUTPATIENT
Start: 2022-01-08 | End: 2022-06-01

## 2022-01-08 RX ORDER — VENLAFAXINE HYDROCHLORIDE 75 MG/1
CAPSULE, EXTENDED RELEASE ORAL
Qty: 180 CAPSULE | Refills: 1 | Status: SHIPPED | OUTPATIENT
Start: 2022-01-08 | End: 2022-06-01

## 2022-01-08 RX ORDER — FINASTERIDE 5 MG/1
TABLET, FILM COATED ORAL
Qty: 90 TABLET | Refills: 1 | Status: SHIPPED | OUTPATIENT
Start: 2022-01-08 | End: 2022-05-31

## 2022-01-09 NOTE — TELEPHONE ENCOUNTER
Refill Authorization Note   Garry Rooney  is requesting a refill authorization.  Brief Assessment and Rationale for Refill:  Approve     Medication Therapy Plan:       Medication Reconciliation Completed: No   Comments:   --->Care Gap information included below if applicable.   Orders Placed This Encounter    valsartan (DIOVAN) 40 MG tablet    finasteride (PROSCAR) 5 mg tablet    venlafaxine (EFFEXOR-XR) 75 MG 24 hr capsule      Requested Prescriptions   Signed Prescriptions Disp Refills    valsartan (DIOVAN) 40 MG tablet 90 tablet 1     Sig: TAKE 1 TABLET EVERY DAY       Cardiovascular:  Angiotensin Receptor Blockers Passed - 1/6/2022  3:55 PM        Passed - Patient is at least 18 years old        Passed - Last BP in normal range within 360 days     BP Readings from Last 1 Encounters:   04/28/21 127/76               Passed - Valid encounter within last 15 months     Recent Visits  Date Type Provider Dept   04/20/21 Office Visit Shiv Trejo MD [facility]   01/20/20 Office Visit Shiv Trejo MD [facility]   Showing recent visits within past 720 days and meeting all other requirements  Future Appointments  No visits were found meeting these conditions.  Showing future appointments within next 150 days and meeting all other requirements      Future Appointments              In 3 months [facility]j [facility]                Passed - Cr is 1.39 or below and within 360 days     Lab Results   Component Value Date    CREATININE 0.7 04/20/2021    CREATININE 0.8 01/20/2020    CREATININE 0.8 04/01/2019              Passed - K is 5.2 or below and within 360 days     Potassium   Date Value Ref Range Status   04/20/2021 4.6 3.5 - 5.1 mmol/L Final   01/20/2020 5.3 (H) 3.5 - 5.1 mmol/L Final   04/01/2019 4.3 3.5 - 5.1 mmol/L Final              Passed - eGFR within 360 days     Lab Results   Component Value Date    EGFRNONAA >60 04/20/2021    EGFRNONAA  >60 01/20/2020    EGFRNONAA >60 04/01/2019                  finasteride (PROSCAR) 5 mg tablet 90 tablet 1     Sig: TAKE 1 TABLET EVERY DAY       Urology: 5-alpha Reductase Inhibitors Passed - 1/6/2022  3:55 PM        Passed - Patient is at least 18 years old        Passed - Prostate Cancer is not on problem list        Passed - Valid encounter within last 15 months     Recent Visits  Date Type Provider Dept   04/20/21 Office Visit Shiv Trejo MD [facility]   01/20/20 Office Visit Shiv Trejo MD [facility]   Showing recent visits within past 720 days and meeting all other requirements  Future Appointments  No visits were found meeting these conditions.  Showing future appointments within next 150 days and meeting all other requirements      Future Appointments              In 3 months Rj [facility]cc[facility]                  venlafaxine (EFFEXOR-XR) 75 MG 24 hr capsule 180 capsule 1     Sig: TAKE 1 CAPSULE TWICE DAILY       Psychiatry: Antidepressants - SNRI - desvenlafaxine & venlafaxine Passed - 1/6/2022  3:55 PM        Passed - Patient is at least 18 years old        Passed - Last BP in normal range within 360 days     BP Readings from Last 1 Encounters:   04/28/21 127/76               Passed - Valid encounter within last 15 months     Recent Visits  Date Type Provider Dept   04/20/21 Office Visit Shiv Trejo MD [facility]   01/20/20 Office Visit Shiv Trejo MD [facility]   Showing recent visits within past 720 days and meeting all other requirements  Future Appointments  No visits were found meeting these conditions.  Showing future appointments within next 150 days and meeting all other requirements      Future Appointments              In 3 months Rj Mccarthy MD [facility]                Passed - Cr is 1.39 or below and within 360 days     Lab Results   Component  Value Date    CREATININE 0.7 04/20/2021    CREATININE 0.8 01/20/2020    CREATININE 0.8 04/01/2019              Passed - eGFR within 360 days     Lab Results   Component Value Date    EGFRNONAA >60 04/20/2021    EGFRNONAA >60 01/20/2020    EGFRNONAA >60 04/01/2019                    Appointments  past 12m or future 3m with PCP    Date Provider   Last Visit   4/20/2021 Shiv Trejo MD   Next Visit   Visit date not found Shiv Trejo MD   ED visits in past 90 days: 0     Note composed:6:25 PM 01/08/2022

## 2022-01-12 ENCOUNTER — TELEPHONE (OUTPATIENT)
Dept: GASTROENTEROLOGY | Facility: CLINIC | Age: 82
End: 2022-01-12
Payer: MEDICARE

## 2022-01-12 NOTE — TELEPHONE ENCOUNTER
----- Message from Shon Miller sent at 1/12/2022  2:05 PM CST -----  Contact: @[phone]  Patient returning a missed call from vira Clay return call

## 2022-01-12 NOTE — TELEPHONE ENCOUNTER
LM with patient's wife for patient to call me back at [phone] re: completing NP questionnaire

## 2022-01-13 ENCOUNTER — TELEPHONE (OUTPATIENT)
Dept: GASTROENTEROLOGY | Facility: CLINIC | Age: 82
End: 2022-01-13
Payer: MEDICARE

## 2022-01-13 NOTE — TELEPHONE ENCOUNTER
Spoke with patient and his wife and NP questionnaire completed.  They both expressed concerns about switching to a new provider after so many years with Dr. Juilo César Lackey.  We discussed everything that the IBD program has to offer, including 3 specialists on staff, a nutritionist, and a pharmacist.  They asked for a list of the provider's name so they could do their own research.  (provided through email at [email or web address]).  They will be in the [place] until sometime in April.  They will get back to us.

## 2022-02-09 ENCOUNTER — PATIENT MESSAGE (OUTPATIENT)
Dept: GASTROENTEROLOGY | Facility: CLINIC | Age: 82
End: 2022-02-09
Payer: MEDICARE

## 2022-02-15 ENCOUNTER — TELEPHONE (OUTPATIENT)
Dept: GASTROENTEROLOGY | Facility: CLINIC | Age: 82
End: 2022-02-15
Payer: MEDICARE

## 2022-02-15 NOTE — TELEPHONE ENCOUNTER
----- Message from Yamila Da Silva sent at 2/15/2022  8:34 AM CST -----  Garry Rooney  wife Anne of 60 yrs calling regarding Appointment Access  (message) for Est care, since Dr. Lackey will no longer be his doctor.  She is looking for this appt for mid April.  call back [phone]

## 2022-02-18 ENCOUNTER — PATIENT MESSAGE (OUTPATIENT)
Dept: GASTROENTEROLOGY | Facility: CLINIC | Age: 82
End: 2022-02-18
Payer: MEDICARE

## 2022-02-21 DIAGNOSIS — K50.80 CROHN'S DISEASE OF BOTH SMALL AND LARGE INTESTINE WITHOUT COMPLICATION: Primary | ICD-10-CM

## 2022-02-21 RX ORDER — ADALIMUMAB 40MG/0.8ML
KIT SUBCUTANEOUS
Qty: 4 PEN | Refills: 6 | Status: SHIPPED | OUTPATIENT
Start: 2022-02-21 | End: 2022-03-10 | Stop reason: ALTCHOICE

## 2022-02-22 ENCOUNTER — TELEPHONE (OUTPATIENT)
Dept: GASTROENTEROLOGY | Facility: CLINIC | Age: 82
End: 2022-02-22
Payer: MEDICARE

## 2022-03-02 ENCOUNTER — PATIENT MESSAGE (OUTPATIENT)
Dept: INTERNAL MEDICINE | Facility: CLINIC | Age: 82
End: 2022-03-02
Payer: MEDICARE

## 2022-03-09 ENCOUNTER — PATIENT MESSAGE (OUTPATIENT)
Dept: GASTROENTEROLOGY | Facility: CLINIC | Age: 82
End: 2022-03-09
Payer: MEDICARE

## 2022-03-10 RX ORDER — ADALIMUMAB 40MG/0.8ML
40 KIT SUBCUTANEOUS
Qty: 12 EACH | Refills: 2 | Status: SHIPPED | OUTPATIENT
Start: 2022-03-10 | End: 2022-03-14

## 2022-03-10 RX ORDER — ADALIMUMAB 40MG/0.8ML
40 KIT SUBCUTANEOUS
Qty: 12 EACH | Refills: 2 | Status: SHIPPED | OUTPATIENT
Start: 2022-03-10 | End: 2022-03-10 | Stop reason: SDUPTHER

## 2022-03-11 ENCOUNTER — PATIENT MESSAGE (OUTPATIENT)
Dept: GASTROENTEROLOGY | Facility: CLINIC | Age: 82
End: 2022-03-11
Payer: MEDICARE

## 2022-03-14 ENCOUNTER — PATIENT MESSAGE (OUTPATIENT)
Dept: GASTROENTEROLOGY | Facility: CLINIC | Age: 82
End: 2022-03-14
Payer: MEDICARE

## 2022-03-14 RX ORDER — ADALIMUMAB 40MG/0.8ML
40 KIT SUBCUTANEOUS
Qty: 12 PEN | Refills: 2 | Status: SHIPPED | OUTPATIENT
Start: 2022-03-14 | End: 2023-03-14

## 2022-03-14 RX ORDER — AZATHIOPRINE 50 MG/1
TABLET ORAL
Qty: 450 TABLET | Refills: 3 | Status: SHIPPED | OUTPATIENT
Start: 2022-03-14

## 2022-03-15 ENCOUNTER — TELEPHONE (OUTPATIENT)
Dept: GASTROENTEROLOGY | Facility: CLINIC | Age: 82
End: 2022-03-15
Payer: MEDICARE

## 2022-03-15 NOTE — TELEPHONE ENCOUNTER
Spoke with pharmacist.  Clarified Humira Rx. He will be dispensed CF pen.   Because [place] a 3 month supply will be provided to the patient at a time with refill for one year.  Frida

## 2022-03-15 NOTE — TELEPHONE ENCOUNTER
Returned call, transferred to pharmacist.  Pharmacist not available.  Asked to leave a message.   Message left.  Frida

## 2022-03-15 NOTE — TELEPHONE ENCOUNTER
----- Message from Maddi Alfredo sent at 3/15/2022 10:39 AM CDT -----  Regarding: [facility]  Regarding:[facility]  is returning missed call from Frida.       Requesting Call back number:[phone] press 1# 3x

## 2022-03-15 NOTE — TELEPHONE ENCOUNTER
----- Message from Tata Jerez sent at 3/15/2022 10:34 AM CDT -----  Regarding: Call Back  Who Called: Carla- [facility]         What is the reason for the call: calling in regards to patient medication order for adalimumab (HUMIRA PEN) PnKt injection. States just looking for clarification. Please contact to further assist.          Can patient be contacted on [employer]: n/a          Call back number: [phone] - pharmacy line

## 2022-03-17 ENCOUNTER — TELEPHONE (OUTPATIENT)
Dept: GASTROENTEROLOGY | Facility: CLINIC | Age: 82
End: 2022-03-17
Payer: MEDICARE

## 2022-03-17 NOTE — TELEPHONE ENCOUNTER
----- Message from Bridget Jerez sent at 3/17/2022  2:00 PM CDT -----  Regarding: pharm auth  Contact: [facility] ([facility])@ [phone]  Rec'd call from [facility] ([facility]) needing clarification on the rx: adalimumab (HUMIRA PEN) PnKt injection, for patient from Dr. Lackey. Please call.

## 2022-03-21 ENCOUNTER — PATIENT MESSAGE (OUTPATIENT)
Dept: GASTROENTEROLOGY | Facility: CLINIC | Age: 82
End: 2022-03-21
Payer: MEDICARE

## 2022-03-29 ENCOUNTER — TELEPHONE (OUTPATIENT)
Dept: GASTROENTEROLOGY | Facility: CLINIC | Age: 82
End: 2022-03-29
Payer: MEDICARE

## 2022-03-29 NOTE — TELEPHONE ENCOUNTER
----- Message from Dania Cjea sent at 3/28/2022  3:55 PM CDT -----  Contact: Francisca([facility])[phone]  Caller requesting a call back regarding the prior authorization for (adalimumab (HUMIRA PEN) PnKt injection)  Please call to discuss further.  Authorization dept at [facility] contact number:  [phone]

## 2022-03-29 NOTE — TELEPHONE ENCOUNTER
Spoke with [facility].  Aware PA not needed.  Patient will not be going through [facility] to get Humira.  He will be getting it filled through [facility] assist.   Frida

## 2022-04-18 ENCOUNTER — TELEPHONE (OUTPATIENT)
Dept: PAIN MEDICINE | Facility: CLINIC | Age: 82
End: 2022-04-18
Payer: MEDICARE

## 2022-04-18 NOTE — TELEPHONE ENCOUNTER
"This message is for patient in regards to his/her appointment 04/196/22 at 11:15a       [facility] Policy: For the safety of all patients and staff members.     Patient Visitor policy: Due to social distancing and limited seating staff are requesting patient to arrive to their schedule appointments on time.     Upon arriving to facility; patients are required to wear a face mask, if patient do not have a face mask one will be provided. Upon arriving patient we ask patients to contact clinic at this number [phone] to notify staff that they have arrived or they may do so by utilizing the Mobile checked in [facility](if patient have patient portal; clinical staff will send a message through there letting them know it's okay to proceed to their visit). Staff will give the patient the "okay" to come up or wait inside their vehicle until clinic is ready for patient to be seen by Dr. jR Arroyo MD. If you have any questions or concerns please contact [phone]    Patient verbalized and confirmed appt  "

## 2022-04-19 ENCOUNTER — OFFICE VISIT (OUTPATIENT)
Dept: PAIN MEDICINE | Facility: CLINIC | Age: 82
End: 2022-04-19
Payer: MEDICARE

## 2022-04-19 VITALS
DIASTOLIC BLOOD PRESSURE: 77 MMHG | BODY MASS INDEX: 22.02 KG/M2 | HEIGHT: 64 IN | TEMPERATURE: 97 F | SYSTOLIC BLOOD PRESSURE: 116 MMHG | HEART RATE: 75 BPM | WEIGHT: 129 LBS | RESPIRATION RATE: 17 BRPM

## 2022-04-19 DIAGNOSIS — S22.000A COMPRESSION FRACTURE OF BODY OF THORACIC VERTEBRA: ICD-10-CM

## 2022-04-19 DIAGNOSIS — M51.36 DDD (DEGENERATIVE DISC DISEASE), LUMBAR: ICD-10-CM

## 2022-04-19 DIAGNOSIS — G89.4 CHRONIC PAIN SYNDROME: Primary | ICD-10-CM

## 2022-04-19 PROCEDURE — 99214 PR OFFICE/OUTPT VISIT, EST, LEVL IV, 30-39 MIN: ICD-10-PCS | Mod: S$GLB,,, | Performed by: ANESTHESIOLOGY

## 2022-04-19 PROCEDURE — 3288F PR FALLS RISK ASSESSMENT DOCUMENTED: ICD-10-PCS | Mod: CPTII,S$GLB,, | Performed by: ANESTHESIOLOGY

## 2022-04-19 PROCEDURE — 1160F RVW MEDS BY RX/DR IN RCRD: CPT | Mod: CPTII,S$GLB,, | Performed by: ANESTHESIOLOGY

## 2022-04-19 PROCEDURE — 1125F AMNT PAIN NOTED PAIN PRSNT: CPT | Mod: CPTII,S$GLB,, | Performed by: ANESTHESIOLOGY

## 2022-04-19 PROCEDURE — 1160F PR REVIEW ALL MEDS BY PRESCRIBER/CLIN PHARMACIST DOCUMENTED: ICD-10-PCS | Mod: CPTII,S$GLB,, | Performed by: ANESTHESIOLOGY

## 2022-04-19 PROCEDURE — 1159F MED LIST DOCD IN RCRD: CPT | Mod: CPTII,S$GLB,, | Performed by: ANESTHESIOLOGY

## 2022-04-19 PROCEDURE — 99999 PR PBB SHADOW E&M-EST. PATIENT-LVL III: CPT | Mod: PBBFAC,,, | Performed by: ANESTHESIOLOGY

## 2022-04-19 PROCEDURE — 3078F DIAST BP <80 MM HG: CPT | Mod: CPTII,S$GLB,, | Performed by: ANESTHESIOLOGY

## 2022-04-19 PROCEDURE — 3074F PR MOST RECENT SYSTOLIC BLOOD PRESSURE < 130 MM HG: ICD-10-PCS | Mod: CPTII,S$GLB,, | Performed by: ANESTHESIOLOGY

## 2022-04-19 PROCEDURE — 99214 OFFICE O/P EST MOD 30 MIN: CPT | Mod: S$GLB,,, | Performed by: ANESTHESIOLOGY

## 2022-04-19 PROCEDURE — 3074F SYST BP LT 130 MM HG: CPT | Mod: CPTII,S$GLB,, | Performed by: ANESTHESIOLOGY

## 2022-04-19 PROCEDURE — 3288F FALL RISK ASSESSMENT DOCD: CPT | Mod: CPTII,S$GLB,, | Performed by: ANESTHESIOLOGY

## 2022-04-19 PROCEDURE — 3078F PR MOST RECENT DIASTOLIC BLOOD PRESSURE < 80 MM HG: ICD-10-PCS | Mod: CPTII,S$GLB,, | Performed by: ANESTHESIOLOGY

## 2022-04-19 PROCEDURE — 1159F PR MEDICATION LIST DOCUMENTED IN MEDICAL RECORD: ICD-10-PCS | Mod: CPTII,S$GLB,, | Performed by: ANESTHESIOLOGY

## 2022-04-19 PROCEDURE — 99999 PR PBB SHADOW E&M-EST. PATIENT-LVL III: ICD-10-PCS | Mod: PBBFAC,,, | Performed by: ANESTHESIOLOGY

## 2022-04-19 PROCEDURE — 1101F PR PT FALLS ASSESS DOC 0-1 FALLS W/OUT INJ PAST YR: ICD-10-PCS | Mod: CPTII,S$GLB,, | Performed by: ANESTHESIOLOGY

## 2022-04-19 PROCEDURE — 1125F PR PAIN SEVERITY QUANTIFIED, PAIN PRESENT: ICD-10-PCS | Mod: CPTII,S$GLB,, | Performed by: ANESTHESIOLOGY

## 2022-04-19 PROCEDURE — 1101F PT FALLS ASSESS-DOCD LE1/YR: CPT | Mod: CPTII,S$GLB,, | Performed by: ANESTHESIOLOGY

## 2022-04-19 RX ORDER — OXYCODONE AND ACETAMINOPHEN 10; 325 MG/1; MG/1
1 TABLET ORAL EVERY 8 HOURS PRN
Qty: 90 TABLET | Refills: 0 | Status: SHIPPED | OUTPATIENT
Start: 2022-06-19 | End: 2022-04-21 | Stop reason: SDUPTHER

## 2022-04-19 RX ORDER — OXYCODONE AND ACETAMINOPHEN TABLETS 10; 300 MG/1; MG/1
1 TABLET ORAL EVERY 8 HOURS PRN
Qty: 90 TABLET | Refills: 0 | Status: SHIPPED | OUTPATIENT
Start: 2022-04-19 | End: 2022-04-21 | Stop reason: SDUPTHER

## 2022-04-19 RX ORDER — OXYCODONE AND ACETAMINOPHEN 10; 325 MG/1; MG/1
1 TABLET ORAL EVERY 8 HOURS PRN
Qty: 90 TABLET | Refills: 0 | Status: SHIPPED | OUTPATIENT
Start: 2022-05-19 | End: 2022-05-25 | Stop reason: SDUPTHER
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
## 2022-04-27 PROCEDURE — 99358 PROLONG SERVICE W/O CONTACT: CPT | Mod: S$GLB,,, | Performed by: INTERNAL MEDICINE

## 2022-04-27 PROCEDURE — 99358 PR PROLONGED SERV,NO CONTACT,1ST HR: ICD-10-PCS | Mod: S$GLB,,, | Performed by: INTERNAL MEDICINE

## 2022-04-29 ENCOUNTER — OFFICE VISIT (OUTPATIENT)
Dept: GASTROENTEROLOGY | Facility: CLINIC | Age: 82
End: 2022-04-29
Payer: MEDICARE

## 2022-04-29 ENCOUNTER — TELEPHONE (OUTPATIENT)
Dept: PAIN MEDICINE | Facility: CLINIC | Age: 82
End: 2022-04-29
Payer: MEDICARE

## 2022-04-29 ENCOUNTER — LAB VISIT (OUTPATIENT)
Dept: LAB | Facility: HOSPITAL | Age: 82
End: 2022-04-29
Attending: INTERNAL MEDICINE
Payer: MEDICARE

## 2022-04-29 VITALS
WEIGHT: 128.5 LBS | SYSTOLIC BLOOD PRESSURE: 146 MMHG | BODY MASS INDEX: 22.06 KG/M2 | TEMPERATURE: 98 F | DIASTOLIC BLOOD PRESSURE: 84 MMHG | OXYGEN SATURATION: 98 % | HEART RATE: 84 BPM

## 2022-04-29 DIAGNOSIS — Z79.899 ENCOUNTER FOR LONG-TERM (CURRENT) USE OF HIGH-RISK MEDICATION: ICD-10-CM

## 2022-04-29 DIAGNOSIS — K50.819 CROHN'S DISEASE OF BOTH SMALL AND LARGE INTESTINE WITH COMPLICATION: ICD-10-CM

## 2022-04-29 DIAGNOSIS — N28.89 RENAL MASS, RIGHT: Primary | ICD-10-CM

## 2022-04-29 LAB
25(OH)D3+25(OH)D2 SERPL-MCNC: 38 NG/ML (ref 30–96)
ALBUMIN SERPL BCP-MCNC: 4 G/DL (ref 3.5–5.2)
ALP SERPL-CCNC: 331 U/L (ref 55–135)
ALT SERPL W/O P-5'-P-CCNC: 8 U/L (ref 10–44)
ANION GAP SERPL CALC-SCNC: 14 MMOL/L (ref 8–16)
AST SERPL-CCNC: 25 U/L (ref 10–40)
BASOPHILS # BLD AUTO: 0.03 K/UL (ref 0–0.2)
BASOPHILS NFR BLD: 0.5 % (ref 0–1.9)
BILIRUB SERPL-MCNC: 0.5 MG/DL (ref 0.1–1)
BUN SERPL-MCNC: 9 MG/DL (ref 8–23)
CALCIUM SERPL-MCNC: 10.3 MG/DL (ref 8.7–10.5)
CHLORIDE SERPL-SCNC: 104 MMOL/L (ref 95–110)
CO2 SERPL-SCNC: 27 MMOL/L (ref 23–29)
CREAT SERPL-MCNC: 0.6 MG/DL (ref 0.5–1.4)
CRP SERPL-MCNC: 11.7 MG/L (ref 0–8.2)
DIFFERENTIAL METHOD: ABNORMAL
EOSINOPHIL # BLD AUTO: 0.1 K/UL (ref 0–0.5)
EOSINOPHIL NFR BLD: 1.7 % (ref 0–8)
ERYTHROCYTE [DISTWIDTH] IN BLOOD BY AUTOMATED COUNT: 16.1 % (ref 11.5–14.5)
EST. GFR  (AFRICAN AMERICAN): >60 ML/MIN/1.73 M^2
EST. GFR  (NON AFRICAN AMERICAN): >60 ML/MIN/1.73 M^2
GLUCOSE SERPL-MCNC: 97 MG/DL (ref 70–110)
HCT VFR BLD AUTO: 38.5 % (ref 40–54)
HGB BLD-MCNC: 12.1 G/DL (ref 14–18)
IMM GRANULOCYTES # BLD AUTO: 0.02 K/UL (ref 0–0.04)
IMM GRANULOCYTES NFR BLD AUTO: 0.3 % (ref 0–0.5)
LYMPHOCYTES # BLD AUTO: 1.7 K/UL (ref 1–4.8)
LYMPHOCYTES NFR BLD: 27.7 % (ref 18–48)
MCH RBC QN AUTO: 32.4 PG (ref 27–31)
MCHC RBC AUTO-ENTMCNC: 31.4 G/DL (ref 32–36)
MCV RBC AUTO: 103 FL (ref 82–98)
MONOCYTES # BLD AUTO: 0.6 K/UL (ref 0.3–1)
MONOCYTES NFR BLD: 10.3 % (ref 4–15)
NEUTROPHILS # BLD AUTO: 3.5 K/UL (ref 1.8–7.7)
NEUTROPHILS NFR BLD: 59.5 % (ref 38–73)
NRBC BLD-RTO: 0 /100 WBC
PLATELET # BLD AUTO: 200 K/UL (ref 150–450)
PMV BLD AUTO: 9.7 FL (ref 9.2–12.9)
POTASSIUM SERPL-SCNC: 5 MMOL/L (ref 3.5–5.1)
PROT SERPL-MCNC: 8.6 G/DL (ref 6–8.4)
RBC # BLD AUTO: 3.74 M/UL (ref 4.6–6.2)
SODIUM SERPL-SCNC: 145 MMOL/L (ref 136–145)
VIT B12 SERPL-MCNC: 954 PG/ML (ref 210–950)
WBC # BLD AUTO: 5.95 K/UL (ref 3.9–12.7)

## 2022-04-29 PROCEDURE — 82306 VITAMIN D 25 HYDROXY: CPT | Performed by: INTERNAL MEDICINE

## 2022-04-29 PROCEDURE — 1101F PR PT FALLS ASSESS DOC 0-1 FALLS W/OUT INJ PAST YR: ICD-10-PCS | Mod: CPTII,S$GLB,, | Performed by: INTERNAL MEDICINE

## 2022-04-29 PROCEDURE — 85025 COMPLETE CBC W/AUTO DIFF WBC: CPT | Performed by: INTERNAL MEDICINE

## 2022-04-29 PROCEDURE — 86706 HEP B SURFACE ANTIBODY: CPT | Performed by: INTERNAL MEDICINE

## 2022-04-29 PROCEDURE — 87340 HEPATITIS B SURFACE AG IA: CPT | Performed by: INTERNAL MEDICINE

## 2022-04-29 PROCEDURE — 36415 COLL VENOUS BLD VENIPUNCTURE: CPT | Performed by: INTERNAL MEDICINE

## 2022-04-29 PROCEDURE — 1125F PR PAIN SEVERITY QUANTIFIED, PAIN PRESENT: ICD-10-PCS | Mod: CPTII,S$GLB,, | Performed by: INTERNAL MEDICINE

## 2022-04-29 PROCEDURE — 3288F FALL RISK ASSESSMENT DOCD: CPT | Mod: CPTII,S$GLB,, | Performed by: INTERNAL MEDICINE

## 2022-04-29 PROCEDURE — 86140 C-REACTIVE PROTEIN: CPT | Performed by: INTERNAL MEDICINE

## 2022-04-29 PROCEDURE — 80053 COMPREHEN METABOLIC PANEL: CPT | Performed by: INTERNAL MEDICINE

## 2022-04-29 PROCEDURE — 3079F PR MOST RECENT DIASTOLIC BLOOD PRESSURE 80-89 MM HG: ICD-10-PCS | Mod: CPTII,S$GLB,, | Performed by: INTERNAL MEDICINE

## 2022-04-29 PROCEDURE — 3077F SYST BP >= 140 MM HG: CPT | Mod: CPTII,S$GLB,, | Performed by: INTERNAL MEDICINE

## 2022-04-29 PROCEDURE — 86480 TB TEST CELL IMMUN MEASURE: CPT | Performed by: INTERNAL MEDICINE

## 2022-04-29 PROCEDURE — 86704 HEP B CORE ANTIBODY TOTAL: CPT | Performed by: INTERNAL MEDICINE

## 2022-04-29 PROCEDURE — 82607 VITAMIN B-12: CPT | Performed by: INTERNAL MEDICINE

## 2022-04-29 PROCEDURE — 1160F RVW MEDS BY RX/DR IN RCRD: CPT | Mod: CPTII,S$GLB,, | Performed by: INTERNAL MEDICINE

## 2022-04-29 PROCEDURE — 99215 PR OFFICE/OUTPT VISIT, EST, LEVL V, 40-54 MIN: ICD-10-PCS | Mod: S$GLB,,, | Performed by: INTERNAL MEDICINE

## 2022-04-29 PROCEDURE — 1159F MED LIST DOCD IN RCRD: CPT | Mod: CPTII,S$GLB,, | Performed by: INTERNAL MEDICINE

## 2022-04-29 PROCEDURE — 3077F PR MOST RECENT SYSTOLIC BLOOD PRESSURE >= 140 MM HG: ICD-10-PCS | Mod: CPTII,S$GLB,, | Performed by: INTERNAL MEDICINE

## 2022-04-29 PROCEDURE — 1125F AMNT PAIN NOTED PAIN PRSNT: CPT | Mod: CPTII,S$GLB,, | Performed by: INTERNAL MEDICINE

## 2022-04-29 PROCEDURE — 1159F PR MEDICATION LIST DOCUMENTED IN MEDICAL RECORD: ICD-10-PCS | Mod: CPTII,S$GLB,, | Performed by: INTERNAL MEDICINE

## 2022-04-29 PROCEDURE — 1160F PR REVIEW ALL MEDS BY PRESCRIBER/CLIN PHARMACIST DOCUMENTED: ICD-10-PCS | Mod: CPTII,S$GLB,, | Performed by: INTERNAL MEDICINE

## 2022-04-29 PROCEDURE — 3288F PR FALLS RISK ASSESSMENT DOCUMENTED: ICD-10-PCS | Mod: CPTII,S$GLB,, | Performed by: INTERNAL MEDICINE

## 2022-04-29 PROCEDURE — 1101F PT FALLS ASSESS-DOCD LE1/YR: CPT | Mod: CPTII,S$GLB,, | Performed by: INTERNAL MEDICINE

## 2022-04-29 PROCEDURE — 3079F DIAST BP 80-89 MM HG: CPT | Mod: CPTII,S$GLB,, | Performed by: INTERNAL MEDICINE

## 2022-04-29 PROCEDURE — 99215 OFFICE O/P EST HI 40 MIN: CPT | Mod: S$GLB,,, | Performed by: INTERNAL MEDICINE

## 2022-04-29 RX ORDER — BALSALAZIDE DISODIUM 750 MG/1
2250 CAPSULE ORAL 3 TIMES DAILY
Qty: 810 CAPSULE | Refills: 3 | Status: SHIPPED | OUTPATIENT
Start: 2022-04-29

## 2022-04-29 RX ORDER — HYOSCYAMINE SULFATE 0.38 MG/1
0.38 TABLET, EXTENDED RELEASE ORAL EVERY 12 HOURS
Qty: 180 TABLET | Refills: 3 | Status: SHIPPED | OUTPATIENT
Start: 2022-04-29

## 2022-04-29 NOTE — PATIENT INSTRUCTIONS
Continue current medications  Increase balsalazide in the future  Get labs today  Let me know what you want to do about the kidney lesion  Follow up

## 2022-04-29 NOTE — TELEPHONE ENCOUNTER
----- Message from Calvin Weiss sent at 4/29/2022  2:34 PM CDT -----  Regarding: Teresa at [facility] pharmacy  Name of Who is Calling: Teresa calling on behalf of DALE ARTHUR [[id number]]              What is the request in detail: Teresa at [facility] states they don't carry the oxyCODONE-acetaminophen (PERCOCET)  mg per tablet. And can't refill the medication Please assist              Can the clinic reply by [facility]: No              What Number to Call Back if not in [facility]: Teresa ph. [phone]  Ref [phone] when calling back

## 2022-04-29 NOTE — PROGRESS NOTES
[facility]          Inflammatory Bowel Disease New Patient Consultation Note         TODAY'S VISIT DATE:  4/29/2022    Reason for Consult:    Chief Complaint   Patient presents with    Crohn's Disease       PCP: Shiv Trejo      Referring MD:   Dr. Julio César Lackey    History of Present Illness:  Garry Rooney who is a 81 y.o. male is being seen today at the [facility] on 04/29/2022 for inflammatory bowel disease- Crohn's disease.  This my 1st time seeing Mr. Rooney but he has been seeing Dr. Lackey for many years.  He has a history of Crohn's disease as detailed below.  He is currently taking Humira 40 mg weekly, azathioprine 250 mg daily, and Colazal 1500 mg 3 times daily.  Overall he reports that he is doing fairly well.  He reports having a nocturnal bowel movement about once a month.  Most days he has about 3 or 4 bowel movements daily that are the consistency of soft serve ice cream.  This is pretty much normal for him.  He does occasionally have some urgency.  Overall he feels as if his condition is pretty stable.  He remains on chronic oxygen therapy for his pulmonary issues.  He has not had labs done in about a year other than labs that were done at his local hospital in [place] when he had surgery for his finger injury.  We reviewed the report from his CT scan from a year ago and he never had any follow-up of the renal mass that was identified on the scan.    IBD History:  He has a history of Crohn's disease.  He was diagnosed in February of 1998 when he began having rectal bleeding.  He was initially treated with Asacol but shortly thereafter he began having worsening diarrhea and severe weight loss.  He was subsequently treated with metronidazole and a colonoscopy revealed a stricture of his sigmoid colon.  Cipro was added as well as prednisone 80 mg daily.  Azathioprine 50 mg daily was also started.  He eventually  underwent a subtotal colectomy with ileosigmoid anastomosis.  About 3 weeks after surgery he developed worsening diarrhea and developed perianal disease with fistula and abscess.  Azathioprine was increased to 100 mg daily and prednisone was increased again back to 80 mg daily.  In May of 2000 he received infliximab and had a dramatic improvement in his fistula drainage but his symptoms recurred 2 weeks after his infusion.  He received 2 more infusions and again had improvement for 2 weeks and then subsequent development of perianal abscess and fistula drainage.  He eventually had setons placed and abscesses were drained.  Subsequently he developed back pain and an MRI showed compression fractures of T12, L1, L2, L3, and L5.  There was also disc herniation in several of the lumbar discs.  A subsequent bone scan was concerning for metastatic disease.  He received a 4th dose of infliximab.  In the earliest note I have from 2000 it is mentioned that he had had at least 2 small bowel series done that did not show any evidence of small intestinal disease.  He subsequently underwent a CT scan and colonoscopy that revealed findings consistent with active Crohn's disease of the sigmoid and rectum.  His azathioprine dose was increased to 150 mg daily.  Surgery was discussed as an option but the patient declined.  Metronidazole was added again.  Infliximab was also given 2 more times.  In late 2000 his azathioprine was increased to 200 mg daily.  Due to neuropathic changes his metronidazole was discontinued in early 2001. In early 2002 at the time of his follow-up he was doing incredibly well with no issues whatsoever.  At that time it was noted that he was on Cipro, Asacol, azathioprine 250 mg daily.  It is also noted that he had had a vertebroplasty due to spinal fractures and osteoporosis.  I do not have a report but it is mentioned that he had a colonoscopy in 2002 that showed no evidence of active disease.  A colonoscopy  in 2005 was noted to have friability in the sigmoid colon and an anastomosis that was 30 cm proximal to the anus.  Biopsies of the rectum and sigmoid colon did show some chronic active inflammatory changes.  No substantial changes to his medical therapy were made at that time because he was doing quite well.  His Asacol was stopped and he was continued on azathioprine 250 mg daily as well as Cipro.  A repeat colonoscopy in December of 2010 showed similar findings with friability in the rectum and sigmoid colon.  His ileum was normal appearing and biopsies of his sigmoid and rectum showed chronic active colitis. He was started on Canasa suppositories and this helped with his symptoms.  In 2013 he started having a flare and was treated with Entocort.  In October of 2015 he had a follow-up colonoscopy performed that showed anal stricture, stenosis of the anastomosis, and diffuse severe inflammation of the rectum and sigmoid colon.  He started Humira in November of 2015. In February 2017 he underwent a colonoscopy that showed severe stenosis of the ileocolonic anastomosis.  This was dilated to 12 cm.  The ileum was normal appearing but he continued to have diffuse mild inflammation in the rectosigmoid colon.  Biopsies continue to show severe chronic active colitis.  His Humira dose was increased to once weekly.  In April 2021 he was seen because of significant weight loss over about 6-12 months.  He also was noted to have looser stools.  A CT enterography was performed and showed a 2.8 x 2.2 cm right renal mass as well as multiple bilateral large renal cysts.  One large right renal cyst revealed linear enhancement.  He was also noted to have perirectal inflammation with asymmetric wall thickening raises suspicion for possible phlegmon.  There was a long segment of colonic wall thickening and hyperenhancement extending up to the colonic anastomosis.  There was also some left perianal soft tissue thickening extending  towards the left gluteal crease raising concern for another inflammatory phlegmon.  His Humira level around that time was 10 with no antibodies present.    IBD Details:  Dx Date:  1998  Disease type/distribution:  Crohn's disease/colonic involvement  Current Treatment:  Humira 40 mg weekly/azathioprine 250 mg daily/Colazal 1500 mg 3 times daily  Start Date:  2015/1990s Response:  Incomplete  Optimized:  Yes  Adverse reactions:  None  Prior surgeries:  Subtotal colectomy with ileosigmoid anastomosis due to stricture in the sigmoid colon  CRP Elevation: Y  calprotectin:  Never checked  Disease Complications:  Sigmoid stricture requiring surgical intervention; perianal fistulizing disease with abscess; anastomotic stricture, anal stricture  Extraintestinal manifestations:  None  Prior treatments:   Steroids:  Unclear response to budesonide and prednisone  5ASA:  Incomplete response to aminosalicylates  IMM:  Currently on azathioprine-incomplete response  TNF Inh:  Good response to TNF inhibitors including infliximab and currently on Humira, no issues with fistulizing disease since being on TNF inhibitors but still some active disease in spite of weekly dosing.   Anti-Integrin:  None   IL 12/23:  None  ANANYA Inh:  None    Previous Clinical Trials:  None    Last Colonoscopy:  February 2017-anal stenosis, severe stenosis of the ileocolonic anastomosis, normal ileum, mild diffuse inflammation in the rectum and sigmoid colon    Other Endoscopies:  None    Imaging:   MRE:  None   CT:  May 2021-some thickening of the rectosigmoid colon, no significant bowel dilation proximal to the anastomosis, right renal lesion   Other:  None    Pertinent Labs:  Lab Results   Component Value Date    SEDRATE 40 (H) 10/28/2011    CRP 7.8 05/01/2021     No results found for: TTGIGA, IGA  Lab Results   Component Value Date    TSH 0.577 04/20/2021     Lab Results   Component Value Date    HKAGPZNR10EC 55 10/22/2010    MPSQSJNY36 618 10/01/2012      No results found for: HEPBSAG, HEPBCAB, HEPCAB  No results found for: PSV33STBE  No results found for: NIL, TBAG, TBAGNIL, MITOGENNIL, TBGOLD, TSPOTSCREN  No results found for: TPTMINTERP, TPMTRESULT  Lab Results   Component Value Date    STOOLCULTURE  10/03/2012     NO SALMONELLA, SHIGELLA, VIBRIO, YERSINIA, OR CAMPYLOBACTER ISOLATED    ZTAUFTWITQ5Y Negative 10/03/2012    KQOJXTANFU4X Negative 10/03/2012    CDIFFICILEAN Negative 05/01/2021    CDIFFTOX Negative 05/01/2021     No results found for: CALPROTECTIN    Therapeutic Drug Monitoring Labs:  Lab Results   Component Value Date    PROMETH See result image under hyperlink 10/31/2017     No results found for: ANSADAINIT, INFLIXIMAB, INFLIXINTERP    Vaccinations:  No results found for: HEPBSAB  No results found for: HEPAIGG  No results found for: VARICELLAZOS, VARICELLAINT  Immunization History   Administered Date(s) Administered    COVID-19, MRNA, LN-S, PF (MODERNA FULL 0.5 ML DOSE) 01/13/2021, 02/03/2021    Influenza - High Dose - PF (65 years and older) 12/18/2015    Pneumococcal Conjugate - 13 Valent 04/06/2016    Tdap 12/09/2011         Review of Systems  Review of Systems   Constitutional: Negative for chills, fever and weight loss.   HENT: Negative for sore throat.    Eyes: Negative for pain, discharge and redness.   Respiratory: Negative for cough, shortness of breath and wheezing.    Cardiovascular: Negative for chest pain and leg swelling.   Gastrointestinal: Negative for abdominal pain, blood in stool, constipation, diarrhea, heartburn, melena, nausea and vomiting.   Genitourinary: Negative for dysuria and frequency.   Musculoskeletal: Positive for back pain. Negative for joint pain and myalgias.   Skin: Negative for rash.   Neurological: Negative for focal weakness and seizures.   Endo/Heme/Allergies: Does not bruise/bleed easily.   Psychiatric/Behavioral: Negative for depression. The patient is not nervous/anxious.        Medical History:    Past Medical History:   Diagnosis Date    Anemia     Bladder cancer     COPD (chronic obstructive pulmonary disease)     Crohn's disease     Depression     Encounter for long-term (current) use of high-risk medication     Hypertension     Lumbar spondylosis 11/2/2020    Melanoma     Osteoporosis, unspecified        Surgical History:  Past Surgical History:   Procedure Laterality Date    APPENDECTOMY      COLON SURGERY      2002    COLONOSCOPY      2010    COLONOSCOPY N/A 10/15/2015    Procedure: COLONOSCOPY;  Surgeon: Julio César Lackey MD;  Location: [facility] ENDO ([facility] FLR);  Service: Endoscopy;  Laterality: N/A;    COLONOSCOPY N/A 2/16/2017    Procedure: COLONOSCOPY;  Surgeon: Julio César Lackey MD;  Location: [facility] ENDO ([facility] FLR);  Service: Endoscopy;  Laterality: N/A;    HERNIA REPAIR         Family History:   Family History   Problem Relation Age of Onset    Irritable bowel syndrome Sister     Retinal detachment Mother     Diabetes Maternal Aunt     Diabetes Maternal Uncle     Crohn's disease Neg Hx     Colon cancer Neg Hx     Amblyopia Neg Hx     Blindness Neg Hx     Thyroid disease Neg Hx     Stroke Neg Hx     Glaucoma Neg Hx     Cataracts Neg Hx        Social History:   Social History     Tobacco Use    Smoking status: Never Smoker    Smokeless tobacco: Never Used   Substance Use Topics    Alcohol use: Yes     Comment: ocassionally - rarely    Drug use: No       Allergies: Reviewed    Home Medications:   Medication List with Changes/Refills   Current Medications    ADALIMUMAB (HUMIRA PEN) PNKT INJECTION    Inject 1 pen (40 mg total) into the skin every 7 days. PEN kit    ASACOL  MG TBEC    Take 1 tablet (800 mg total) by mouth 3 (three) times daily.    AZATHIOPRINE (IMURAN) 50 MG TAB    TAKE 5 TABLETS ONE TIME DAILY    CIPROFLOXACIN HCL (CIPRO) 500 MG TABLET    TAKE 1 TABLET TWICE DAILY    FINASTERIDE (PROSCAR) 5 MG TABLET    TAKE 1 TABLET EVERY DAY    OXYCODONE-ACETAMINOPHEN  (PERCOCET)  MG PER TABLET    Take 1 tablet by mouth every 8 (eight) hours as needed for Pain.    VALSARTAN (DIOVAN) 40 MG TABLET    TAKE 1 TABLET EVERY DAY    VENLAFAXINE (EFFEXOR-XR) 75 MG 24 HR CAPSULE    TAKE 1 CAPSULE TWICE DAILY   Changed and/or Refilled Medications    Modified Medication Previous Medication    BALSALAZIDE (COLAZAL) 750 MG CAPSULE balsalazide (COLAZAL) 750 mg capsule       Take 3 capsules (2,250 mg total) by mouth 3 (three) times daily.    Take 2 capsules (1,500 mg total) by mouth 3 (three) times daily.    HYOSCYAMINE (LEVBID) 0.375 MG TB12 hyoscyamine (LEVBID) 0.375 mg Tb12       Take 1 tablet (0.375 mg total) by mouth every 12 (twelve) hours.    TAKE 1 TABLET (0.375 MG TOTAL) BY MOUTH EVERY 12 (TWELVE) HOURS.   Discontinued Medications    OXYCODONE-ACETAMINOPHEN (LYNOX)  MG PER TABLET    Take 1 tablet by mouth every 8 (eight) hours as needed for Pain.    OXYCODONE-ACETAMINOPHEN (PERCOCET)  MG PER TABLET    Take 1 tablet by mouth every 8 (eight) hours as needed for Pain.       Physical Exam:  Vital Signs:  BP (!) 146/84 (BP Location: Right arm, Patient Position: Sitting)   Pulse 84   Temp 97.7 °F (36.5 °C)   Wt 58.3 kg (128 lb 8.5 oz)   SpO2 98%   BMI 22.06 kg/m²   Body mass index is 22.06 kg/m².    Physical Exam  Vitals and nursing note reviewed.   Constitutional:       General: He is not in acute distress.     Appearance: Normal appearance. He is well-developed. He is not ill-appearing or toxic-appearing.   HENT:      Head: Normocephalic and atraumatic.   Eyes:      General: No scleral icterus.     Pupils: Pupils are equal, round, and reactive to light.   Neck:      Thyroid: No thyromegaly.   Cardiovascular:      Rate and Rhythm: Normal rate and regular rhythm.      Heart sounds: No murmur heard.    No friction rub.   Pulmonary:      Effort: Pulmonary effort is normal.      Breath sounds: Normal breath sounds. No wheezing or rales.   Abdominal:      General: Bowel  sounds are normal. There is no distension.      Palpations: Abdomen is soft. There is no mass.      Tenderness: There is no abdominal tenderness. There is no guarding or rebound.   Lymphadenopathy:      Cervical: No cervical adenopathy.   Skin:     Findings: No rash.   Neurological:      General: No focal deficit present.      Mental Status: He is alert and oriented to person, place, and time.   Psychiatric:         Mood and Affect: Mood normal.         Behavior: Behavior normal.         Thought Content: Thought content normal.         Judgment: Judgment normal.         Labs: reviewed and pertinent noted above    Assessment/Plan:  Garry Rooney is a 81 y.o. male with complicated colonic Crohn's disease currently on Humira, azathioprine, and Colazal. The following issues were addresssed:    1. Renal mass, right    2. Crohn's disease of both small and large intestine with complication    3. Encounter for long-term (current) use of high-risk medication      1. Crohn's disease:  Overall his symptoms are stable.  Based on his last colonoscopy and on his CT enterography from last year it does appear that he may still have some disease activity.  We discussed the fact that he is only having about 3 or 4 bowel movements daily and, given the amount of:  He has remaining this may be about as good as his symptoms get even if we were to get complete control of his inflammation.  We discussed a few different treatment options including adding Canasa suppositories given his positive reaction in the past.  He is not interested in this.  We discussed potentially switching to another treatment from Humira but he has concerns about loss of response and further development of perianal fistulizing disease.  I agree that without any objective evidence of active inflammation I would be hesitant to switch as I do not know that any other medication is going to work as well.  One option would be to potentially increase the dose of  balsalazide to 3 pills 3 times daily.  I think the risk of doing this is quite low but I do not suspect that we are going to get a huge benefit out of it.  He would like to try this.  We discussed symptomatic management including possibly using Imodium or Lomotil to decrease the bowel frequency and urgency but I am concerned with the anastomotic stricture that this may increase his risk of developing a bowel obstruction.  At this time he would like to continue his current medical therapy with the exception of increasing the Colazal dose.  We discussed colonoscopy for colon cancer screening and disease activity assessment and possible dilation of the stricture and he is not interested in this at the present time.  He would prefer not to have any aggressive treatments as long as he is feeling okay.  He understands the risk of undiagnosed malignancy and is okay with that.    2. Right renal mass:  We discussed that there was concern that this could be a malignant tumor.  He is unsure if he wants any further evaluation of this at the current time.  We discussed possibly repeating a CT scan to see if there has been any change in the size of the lesion.  We also discussed a possible referral to Urology.  He will let me know if he wants to do any further management.  We did discuss in detail that this is potentially a malignant tumor that could spread.    3. Medication management:  We will update his labs today.         # IBD specific health maintenance:  Colon cancer surveillance:  Patient declines surveillance    Annual:  - Eye exam:  Not applicable  - Skin exam (if on IMM/TNF):  Discuss in the future  - reminded pt to use sunblock/hats/sunprotective clothing  - PAP (if immunosuppressed):  Not applicable    DEXA:  Patient with a diagnosis of osteoporosis    Vitamin D:  Check today    Vaccines:    Review in the future    Follow up: Follow up in about 6 months (around 10/29/2022).    Thank you again for sending Garry  Nevin to see Dr. Peng Barbosa today at the [facility]. Please don't hesitate to contact Dr. Barbosa if there are any questions regarding this evaluation, or if you have any other patients with inflammatory bowel disease for whom you would like a consultation. You can reach Dr. Barbosa at [phone] or by email at [email or web address]    Rodo Barbosa MD  Department of Gastroenterology  Inflammatory Bowel Disease

## 2022-04-29 NOTE — PROGRESS NOTES
I spent 50 minutes on 4/27/22 reviewing Garry Rooney medical records prior to clinic visit on 4/29/22.

## 2022-05-02 ENCOUNTER — PATIENT MESSAGE (OUTPATIENT)
Dept: GASTROENTEROLOGY | Facility: HOSPITAL | Age: 82
End: 2022-05-02
Payer: MEDICARE

## 2022-05-02 DIAGNOSIS — R74.8 ELEVATED ALKALINE PHOSPHATASE LEVEL: Primary | ICD-10-CM

## 2022-05-03 ENCOUNTER — PATIENT MESSAGE (OUTPATIENT)
Dept: GASTROENTEROLOGY | Facility: CLINIC | Age: 82
End: 2022-05-03
Payer: MEDICARE

## 2022-05-03 ENCOUNTER — TELEPHONE (OUTPATIENT)
Dept: PAIN MEDICINE | Facility: CLINIC | Age: 82
End: 2022-05-03
Payer: MEDICARE

## 2022-05-03 ENCOUNTER — TELEPHONE (OUTPATIENT)
Dept: GASTROENTEROLOGY | Facility: CLINIC | Age: 82
End: 2022-05-03
Payer: MEDICARE

## 2022-05-03 LAB
GAMMA INTERFERON BACKGROUND BLD IA-ACNC: 0.04 IU/ML
HBV SURFACE AB SER QL IA: NEGATIVE
HBV SURFACE AB SERPL IA-ACNC: 5 MIU/ML
M TB IFN-G CD4+ BCKGRND COR BLD-ACNC: -0 IU/ML
MITOGEN IGNF BCKGRD COR BLD-ACNC: 9.96 IU/ML
TB GOLD PLUS: NEGATIVE
TB2 - NIL: 0.01 IU/ML

## 2022-05-03 NOTE — TELEPHONE ENCOUNTER
Mable did not received script to due patient medication being requested to be fill which is set for 5/19/22

## 2022-05-03 NOTE — TELEPHONE ENCOUNTER
----- Message from Mohit Reed sent at 5/3/2022  9:46 AM CDT -----  Regarding: Medication  Contact: Isabel Brunson)  Name of Who is Calling: Isabel Brunson)      What is the request in detail: Would like to speak with staff in regards oxyCODONE-acetaminophen (PERCOCET); states they are unclear if patient still needs the  or did he received elsewhere. Mable has not received the prescription dated for 5/19. Please advise on if patient still needs medication filled.       Can the clinic reply by [facility]: no      What Number to Call Back if not in [facility]: [phone]

## 2022-05-04 LAB
HBV CORE AB SERPL QL IA: NEGATIVE
HBV SURFACE AG SERPL QL IA: NEGATIVE

## 2022-05-24 ENCOUNTER — PATIENT MESSAGE (OUTPATIENT)
Dept: GASTROENTEROLOGY | Facility: CLINIC | Age: 82
End: 2022-05-24
Payer: MEDICARE

## 2022-05-25 ENCOUNTER — PATIENT MESSAGE (OUTPATIENT)
Dept: PAIN MEDICINE | Facility: CLINIC | Age: 82
End: 2022-05-25
Payer: MEDICARE

## 2022-05-25 DIAGNOSIS — G89.4 CHRONIC PAIN SYNDROME: ICD-10-CM

## 2022-05-25 RX ORDER — OXYCODONE AND ACETAMINOPHEN 10; 325 MG/1; MG/1
1 TABLET ORAL EVERY 8 HOURS PRN
Qty: 90 TABLET | Refills: 0 | Status: SHIPPED | OUTPATIENT
Start: 2022-05-25 | End: 2022-06-24 | Stop reason: SDUPTHER

## 2022-05-26 ENCOUNTER — TELEPHONE (OUTPATIENT)
Dept: GASTROENTEROLOGY | Facility: CLINIC | Age: 82
End: 2022-05-26
Payer: MEDICARE

## 2022-05-30 ENCOUNTER — PATIENT MESSAGE (OUTPATIENT)
Dept: ADMINISTRATIVE | Facility: HOSPITAL | Age: 82
End: 2022-05-30
Payer: MEDICARE

## 2022-05-31 DIAGNOSIS — I10 ESSENTIAL HYPERTENSION: ICD-10-CM

## 2022-05-31 DIAGNOSIS — F32.5 MAJOR DEPRESSIVE DISORDER WITH SINGLE EPISODE, IN FULL REMISSION: ICD-10-CM

## 2022-05-31 DIAGNOSIS — N40.0 BENIGN PROSTATIC HYPERPLASIA WITHOUT LOWER URINARY TRACT SYMPTOMS: ICD-10-CM

## 2022-05-31 RX ORDER — FINASTERIDE 5 MG/1
5 TABLET, FILM COATED ORAL DAILY
Qty: 90 TABLET | Refills: 0 | Status: SHIPPED | OUTPATIENT
Start: 2022-05-31

## 2022-05-31 NOTE — TELEPHONE ENCOUNTER
Care Due:                  Date            Visit Type   Department     Provider  --------------------------------------------------------------------------------                                EP -                              PRIMARY      [facility] INTERNAL  Last Visit: 04-      [facility] ([facility])   MEDICINE       Shiv Trejo  Next Visit: None Scheduled  None         None Found                                                            Last  Test          Frequency    Reason                     Performed    Due Date  --------------------------------------------------------------------------------    Office Visit  12 months..  finasteride, valsartan,    04-   04-                             venlafaxine..............    [employer] Embedded Care Gaps. Reference number: [id number]. 5/31/2022   1:14:42 PM CDT

## 2022-05-31 NOTE — TELEPHONE ENCOUNTER
Refill Routing Note   Medication(s) are not appropriate for processing by [facility] for the following reason(s):      - Required vitals are abnormal    [facility] action(s):  Defer  Approve Medication-related problems identified: Requires appointment     Medication Therapy Plan: Needs an OV with PCP, lov 4/20/21; DEFER: valsartan, venlafaxine (last BP 04/29/22 (!) 146/84); APPROVE finasteride  Medication reconciliation completed: No     Appointments  past 12m or future 3m with PCP    Date Provider   Last Visit   4/20/2021 Shiv Trejo MD   Next Visit   Visit date not found Shiv Trejo MD   ED visits in past 90 days: 0        Note composed:1:46 PM 05/31/2022

## 2022-06-01 RX ORDER — VENLAFAXINE HYDROCHLORIDE 75 MG/1
CAPSULE, EXTENDED RELEASE ORAL
Qty: 180 CAPSULE | Refills: 1 | Status: SHIPPED | OUTPATIENT
Start: 2022-06-01

## 2022-06-01 RX ORDER — VALSARTAN 40 MG/1
TABLET ORAL
Qty: 90 TABLET | Refills: 1 | Status: SHIPPED | OUTPATIENT
Start: 2022-06-01

## 2022-06-02 NOTE — TELEPHONE ENCOUNTER
Provider Staff:     Action is required for this patient.   Please see care gap opportunities below in Care Due Message.     Thanks!  [facility]     Appointments      Date Provider   Last Visit   4/20/2021 Shiv Trejo MD   Next Visit   Visit date not found Shiv Trejo MD     Note composed:7:18 PM 06/01/2022

## 2022-06-20 ENCOUNTER — PATIENT MESSAGE (OUTPATIENT)
Dept: PAIN MEDICINE | Facility: CLINIC | Age: 82
End: 2022-06-20
Payer: MEDICARE

## 2022-06-23 ENCOUNTER — PATIENT MESSAGE (OUTPATIENT)
Dept: PAIN MEDICINE | Facility: CLINIC | Age: 82
End: 2022-06-23
Payer: MEDICARE

## 2022-06-23 ENCOUNTER — TELEPHONE (OUTPATIENT)
Dept: PAIN MEDICINE | Facility: CLINIC | Age: 82
End: 2022-06-23
Payer: MEDICARE

## 2022-06-24 ENCOUNTER — OFFICE VISIT (OUTPATIENT)
Dept: PAIN MEDICINE | Facility: CLINIC | Age: 82
End: 2022-06-24
Payer: MEDICARE

## 2022-06-24 ENCOUNTER — LAB VISIT (OUTPATIENT)
Dept: LAB | Facility: OTHER | Age: 82
End: 2022-06-24
Attending: INTERNAL MEDICINE
Payer: MEDICARE

## 2022-06-24 VITALS
HEART RATE: 89 BPM | DIASTOLIC BLOOD PRESSURE: 70 MMHG | TEMPERATURE: 97 F | SYSTOLIC BLOOD PRESSURE: 120 MMHG | WEIGHT: 125.69 LBS | BODY MASS INDEX: 21.46 KG/M2 | HEIGHT: 64 IN | RESPIRATION RATE: 18 BRPM | OXYGEN SATURATION: 99 %

## 2022-06-24 DIAGNOSIS — M47.816 LUMBAR SPONDYLOSIS: ICD-10-CM

## 2022-06-24 DIAGNOSIS — S22.000A COMPRESSION FRACTURE OF BODY OF THORACIC VERTEBRA: ICD-10-CM

## 2022-06-24 DIAGNOSIS — G89.4 CHRONIC PAIN SYNDROME: Primary | ICD-10-CM

## 2022-06-24 DIAGNOSIS — M51.36 DDD (DEGENERATIVE DISC DISEASE), LUMBAR: ICD-10-CM

## 2022-06-24 DIAGNOSIS — Z51.81 ENCOUNTER FOR MONITORING OPIOID MAINTENANCE THERAPY: ICD-10-CM

## 2022-06-24 DIAGNOSIS — M81.8 STEROID-INDUCED OSTEOPOROSIS: ICD-10-CM

## 2022-06-24 DIAGNOSIS — T38.0X5A STEROID-INDUCED OSTEOPOROSIS: ICD-10-CM

## 2022-06-24 DIAGNOSIS — Z79.891 ENCOUNTER FOR MONITORING OPIOID MAINTENANCE THERAPY: ICD-10-CM

## 2022-06-24 DIAGNOSIS — R74.8 ELEVATED ALKALINE PHOSPHATASE LEVEL: ICD-10-CM

## 2022-06-24 DIAGNOSIS — R53.81 PHYSICAL DECONDITIONING: ICD-10-CM

## 2022-06-24 PROCEDURE — 80326 AMPHETAMINES 5 OR MORE: CPT | Performed by: NURSE PRACTITIONER

## 2022-06-24 PROCEDURE — 99214 PR OFFICE/OUTPT VISIT, EST, LEVL IV, 30-39 MIN: ICD-10-PCS | Mod: S$GLB,,, | Performed by: NURSE PRACTITIONER

## 2022-06-24 PROCEDURE — 80355 GABAPENTIN NON-BLOOD: CPT | Performed by: NURSE PRACTITIONER

## 2022-06-24 PROCEDURE — 99214 OFFICE O/P EST MOD 30 MIN: CPT | Mod: S$GLB,,, | Performed by: NURSE PRACTITIONER

## 2022-06-24 PROCEDURE — 1160F RVW MEDS BY RX/DR IN RCRD: CPT | Mod: CPTII,S$GLB,, | Performed by: NURSE PRACTITIONER

## 2022-06-24 PROCEDURE — 1125F PR PAIN SEVERITY QUANTIFIED, PAIN PRESENT: ICD-10-PCS | Mod: CPTII,S$GLB,, | Performed by: NURSE PRACTITIONER

## 2022-06-24 PROCEDURE — 84075 ASSAY ALKALINE PHOSPHATASE: CPT | Performed by: INTERNAL MEDICINE

## 2022-06-24 PROCEDURE — 3288F FALL RISK ASSESSMENT DOCD: CPT | Mod: CPTII,S$GLB,, | Performed by: NURSE PRACTITIONER

## 2022-06-24 PROCEDURE — 1160F PR REVIEW ALL MEDS BY PRESCRIBER/CLIN PHARMACIST DOCUMENTED: ICD-10-PCS | Mod: CPTII,S$GLB,, | Performed by: NURSE PRACTITIONER

## 2022-06-24 PROCEDURE — 1101F PR PT FALLS ASSESS DOC 0-1 FALLS W/OUT INJ PAST YR: ICD-10-PCS | Mod: CPTII,S$GLB,, | Performed by: NURSE PRACTITIONER

## 2022-06-24 PROCEDURE — 3074F SYST BP LT 130 MM HG: CPT | Mod: CPTII,S$GLB,, | Performed by: NURSE PRACTITIONER

## 2022-06-24 PROCEDURE — 1125F AMNT PAIN NOTED PAIN PRSNT: CPT | Mod: CPTII,S$GLB,, | Performed by: NURSE PRACTITIONER

## 2022-06-24 PROCEDURE — 99999 PR PBB SHADOW E&M-EST. PATIENT-LVL V: ICD-10-PCS | Mod: PBBFAC,,, | Performed by: NURSE PRACTITIONER

## 2022-06-24 PROCEDURE — 99999 PR PBB SHADOW E&M-EST. PATIENT-LVL V: CPT | Mod: PBBFAC,,, | Performed by: NURSE PRACTITIONER

## 2022-06-24 PROCEDURE — 3078F DIAST BP <80 MM HG: CPT | Mod: CPTII,S$GLB,, | Performed by: NURSE PRACTITIONER

## 2022-06-24 PROCEDURE — 3288F PR FALLS RISK ASSESSMENT DOCUMENTED: ICD-10-PCS | Mod: CPTII,S$GLB,, | Performed by: NURSE PRACTITIONER

## 2022-06-24 PROCEDURE — 3078F PR MOST RECENT DIASTOLIC BLOOD PRESSURE < 80 MM HG: ICD-10-PCS | Mod: CPTII,S$GLB,, | Performed by: NURSE PRACTITIONER

## 2022-06-24 PROCEDURE — 1159F PR MEDICATION LIST DOCUMENTED IN MEDICAL RECORD: ICD-10-PCS | Mod: CPTII,S$GLB,, | Performed by: NURSE PRACTITIONER

## 2022-06-24 PROCEDURE — 3074F PR MOST RECENT SYSTOLIC BLOOD PRESSURE < 130 MM HG: ICD-10-PCS | Mod: CPTII,S$GLB,, | Performed by: NURSE PRACTITIONER

## 2022-06-24 PROCEDURE — 36415 COLL VENOUS BLD VENIPUNCTURE: CPT | Performed by: INTERNAL MEDICINE

## 2022-06-24 PROCEDURE — 1101F PT FALLS ASSESS-DOCD LE1/YR: CPT | Mod: CPTII,S$GLB,, | Performed by: NURSE PRACTITIONER

## 2022-06-24 PROCEDURE — 1159F MED LIST DOCD IN RCRD: CPT | Mod: CPTII,S$GLB,, | Performed by: NURSE PRACTITIONER

## 2022-06-24 RX ORDER — OXYCODONE AND ACETAMINOPHEN 10; 325 MG/1; MG/1
1 TABLET ORAL EVERY 8 HOURS PRN
Qty: 90 TABLET | Refills: 0 | Status: SHIPPED | OUTPATIENT
Start: 2022-06-24 | End: 2022-07-24

## 2022-06-24 RX ORDER — OXYCODONE AND ACETAMINOPHEN 10; 325 MG/1; MG/1
1 TABLET ORAL EVERY 8 HOURS PRN
Qty: 90 TABLET | Refills: 0 | Status: SHIPPED | OUTPATIENT
Start: 2022-07-23 | End: 2022-08-22

## 2022-06-24 RX ORDER — OXYCODONE AND ACETAMINOPHEN 10; 325 MG/1; MG/1
1 TABLET ORAL EVERY 8 HOURS PRN
Qty: 90 TABLET | Refills: 0 | Status: SHIPPED | OUTPATIENT
Start: 2022-08-22 | End: 2022-09-21

## 2022-06-24 RX ORDER — NALOXONE HYDROCHLORIDE 4 MG/.1ML
SPRAY NASAL
Qty: 1 EACH | Refills: 11 | Status: SHIPPED | OUTPATIENT
Start: 2022-06-24
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
## 2022-06-30 LAB
6MAM UR QL: NOT DETECTED
7AMINOCLONAZEPAM UR QL: NOT DETECTED
A-OH ALPRAZ UR QL: NOT DETECTED
ALPHA-OH-MIDAZOLAM: NOT DETECTED
ALPRAZ UR QL: NOT DETECTED
AMPHET UR QL SCN: NOT DETECTED
ANNOTATION COMMENT IMP: NORMAL
ANNOTATION COMMENT IMP: NORMAL
BARBITURATES UR QL: NOT DETECTED
BUPRENORPHINE UR QL: NOT DETECTED
BZE UR QL: NOT DETECTED
CARBOXYTHC UR QL: NOT DETECTED
CARISOPRODOL UR QL: NOT DETECTED
CLONAZEPAM UR QL: NOT DETECTED
CODEINE UR QL: NOT DETECTED
CREAT UR-MCNC: 131.8 MG/DL (ref 20–400)
DIAZEPAM UR QL: NOT DETECTED
ETHYL GLUCURONIDE UR QL: PRESENT
FENTANYL UR QL: NOT DETECTED
GABAPENTIN: NOT DETECTED
HYDROCODONE UR QL: NOT DETECTED
HYDROMORPHONE UR QL: NOT DETECTED
LORAZEPAM UR QL: NOT DETECTED
MDA UR QL: NOT DETECTED
MDEA UR QL: NOT DETECTED
MDMA UR QL: NOT DETECTED
ME-PHENIDATE UR QL: NOT DETECTED
METHADONE UR QL: NOT DETECTED
METHAMPHET UR QL: NOT DETECTED
MIDAZOLAM UR QL SCN: NOT DETECTED
MORPHINE UR QL: NOT DETECTED
NALOXONE: NOT DETECTED
NORBUPRENORPHINE UR QL CFM: NOT DETECTED
NORDIAZEPAM UR QL: NOT DETECTED
NORFENTANYL UR QL: NOT DETECTED
NORHYDROCODONE UR QL CFM: NOT DETECTED
NORMEPERIDINE UR QL CFM: NOT DETECTED
NOROXYCODONE UR QL CFM: PRESENT
NOROXYMORPHONE UR QL SCN: PRESENT
OXAZEPAM UR QL: NOT DETECTED
OXYCODONE UR QL: PRESENT
OXYMORPHONE UR QL: PRESENT
PATHOLOGY STUDY: NORMAL
PCP UR QL: NOT DETECTED
PHENTERMINE UR QL: NOT DETECTED
PREGABALIN: NOT DETECTED
SERVICE CMNT-IMP: NORMAL
TAPENTADOL UR QL SCN: NOT DETECTED
TAPENTADOL UR QL SCN: NOT DETECTED
TEMAZEPAM UR QL: NOT DETECTED
TRAMADOL UR QL: NOT DETECTED
ZOLPIDEM METABOLITE: NOT DETECTED
ZOLPIDEM UR QL: NOT DETECTED

## 2022-07-01 LAB
ALP BONE CFR SERPL: 71 % (ref 28–66)
ALP INTEST CFR SERPL: 6 % (ref 1–24)
ALP ISOS SERPL-IMP: ABNORMAL
ALP LIVER CFR SERPL: 23 % (ref 25–69)
ALP MACROHEPATIC CFR SERPL: ABNORMAL %
ALP PLAC CFR SERPL: 0 %
ALP SERPL-CCNC: 496 U/L (ref 35–144)

## 2022-07-05 ENCOUNTER — TELEPHONE (OUTPATIENT)
Dept: INTERNAL MEDICINE | Facility: CLINIC | Age: 82
End: 2022-07-05
Payer: MEDICARE

## 2022-07-05 NOTE — TELEPHONE ENCOUNTER
----- Message from Shiv Vaca MD sent at 7/5/2022  3:44 PM CDT -----  He has not. We will reach out to him. Thanks for keeping me in the loop.    STAFF: please contact MR Rooney to schedule at least a telephone appt if not in office    Respectfully,  Shiv Vaca    ----- Message -----  From: Rodo Barbosa MD  Sent: 7/5/2022  12:23 PM CDT  To: Shiv Vaca MD    I wanted to reach out about Mr. Rooney. When I saw him recently for the first time I noticed that his alk phos was pretty elevated which was new for him. I was finally able to get him in to do a fractionated alk phos and it appears that the elevation is coming from the bones. I asked him to follow up with you to further assess his bones but I wanted to reach out just in case he didn't touch base with you.    Thanks,  Peng

## 2022-07-05 NOTE — TELEPHONE ENCOUNTER
"Called pt. No answer, left VM that I reached out on behalf of Dr. Trejo and will send a portal message with more info.    Sent pt portal message with "if not read by" notification.    "

## 2022-07-05 NOTE — LETTER
July 12, 2022    Garry Rooney  [address]  [place] [address]             [facility]  [address]  [place] [address]  Phone: [phone]  Fax: [phone] Dear Mr. Rooney:    I'm sorry we missed you when we called. Dr. Trejo and Dr. Barbosa had recently collaborated regarding some of your lab work. Dr. Trejo would like for you to follow up with him to discuss further. He said in person or virtually (video or audio) is fine.       Please reach out by phone [phone] or reply to our [employer] message to schedule.    If you have any questions or concerns, please don't hesitate to call.    Sincerely,    Shiv Trejo MD

## 2022-07-12 ENCOUNTER — PATIENT MESSAGE (OUTPATIENT)
Dept: INTERNAL MEDICINE | Facility: CLINIC | Age: 82
End: 2022-07-12
Payer: MEDICARE

## 2022-07-12 NOTE — TELEPHONE ENCOUNTER
07/11 Letter from Dr. Trejo to Patient mailed to listed address [address] [facility] [address] [place] ,[address]

## 2022-07-18 ENCOUNTER — OFFICE VISIT (OUTPATIENT)
Dept: INTERNAL MEDICINE | Facility: CLINIC | Age: 82
End: 2022-07-18
Payer: MEDICARE

## 2022-07-18 ENCOUNTER — HOSPITAL ENCOUNTER (OUTPATIENT)
Dept: RADIOLOGY | Facility: OTHER | Age: 82
Discharge: HOME | End: 2022-07-18
Attending: PHYSICIAN ASSISTANT
Payer: MEDICARE

## 2022-07-18 VITALS
HEART RATE: 102 BPM | SYSTOLIC BLOOD PRESSURE: 114 MMHG | WEIGHT: 119.06 LBS | OXYGEN SATURATION: 96 % | BODY MASS INDEX: 20.43 KG/M2 | DIASTOLIC BLOOD PRESSURE: 58 MMHG

## 2022-07-18 DIAGNOSIS — R07.81 RIB PAIN: Primary | ICD-10-CM

## 2022-07-18 DIAGNOSIS — R06.02 SOB (SHORTNESS OF BREATH): ICD-10-CM

## 2022-07-18 DIAGNOSIS — U07.1 COVID-19 VIRUS DETECTED: ICD-10-CM

## 2022-07-18 DIAGNOSIS — R07.81 RIB PAIN: ICD-10-CM

## 2022-07-18 DIAGNOSIS — S22.41XA CLOSED FRACTURE OF MULTIPLE RIBS OF RIGHT SIDE, INITIAL ENCOUNTER: ICD-10-CM

## 2022-07-18 DIAGNOSIS — R06.02 SHORTNESS OF BREATH: ICD-10-CM

## 2022-07-18 LAB — SARS-COV-2 RNA RESP QL NAA+PROBE: DETECTED

## 2022-07-18 PROCEDURE — 1160F RVW MEDS BY RX/DR IN RCRD: CPT | Mod: CPTII,S$GLB,, | Performed by: PHYSICIAN ASSISTANT

## 2022-07-18 PROCEDURE — 99214 OFFICE O/P EST MOD 30 MIN: CPT | Mod: S$GLB,,, | Performed by: PHYSICIAN ASSISTANT

## 2022-07-18 PROCEDURE — 1125F AMNT PAIN NOTED PAIN PRSNT: CPT | Mod: CPTII,S$GLB,, | Performed by: PHYSICIAN ASSISTANT

## 2022-07-18 PROCEDURE — 71250 CT THORAX DX C-: CPT | Mod: 26,,, | Performed by: RADIOLOGY

## 2022-07-18 PROCEDURE — 1101F PR PT FALLS ASSESS DOC 0-1 FALLS W/OUT INJ PAST YR: ICD-10-PCS | Mod: CPTII,S$GLB,, | Performed by: PHYSICIAN ASSISTANT

## 2022-07-18 PROCEDURE — 1101F PT FALLS ASSESS-DOCD LE1/YR: CPT | Mod: CPTII,S$GLB,, | Performed by: PHYSICIAN ASSISTANT

## 2022-07-18 PROCEDURE — 1159F PR MEDICATION LIST DOCUMENTED IN MEDICAL RECORD: ICD-10-PCS | Mod: CPTII,S$GLB,, | Performed by: PHYSICIAN ASSISTANT

## 2022-07-18 PROCEDURE — 1125F PR PAIN SEVERITY QUANTIFIED, PAIN PRESENT: ICD-10-PCS | Mod: CPTII,S$GLB,, | Performed by: PHYSICIAN ASSISTANT

## 2022-07-18 PROCEDURE — 1160F PR REVIEW ALL MEDS BY PRESCRIBER/CLIN PHARMACIST DOCUMENTED: ICD-10-PCS | Mod: CPTII,S$GLB,, | Performed by: PHYSICIAN ASSISTANT

## 2022-07-18 PROCEDURE — 3074F PR MOST RECENT SYSTOLIC BLOOD PRESSURE < 130 MM HG: ICD-10-PCS | Mod: CPTII,S$GLB,, | Performed by: PHYSICIAN ASSISTANT

## 2022-07-18 PROCEDURE — 3078F DIAST BP <80 MM HG: CPT | Mod: CPTII,S$GLB,, | Performed by: PHYSICIAN ASSISTANT

## 2022-07-18 PROCEDURE — 99999 PR PBB SHADOW E&M-EST. PATIENT-LVL IV: CPT | Mod: PBBFAC,,, | Performed by: PHYSICIAN ASSISTANT

## 2022-07-18 PROCEDURE — 1159F MED LIST DOCD IN RCRD: CPT | Mod: CPTII,S$GLB,, | Performed by: PHYSICIAN ASSISTANT

## 2022-07-18 PROCEDURE — 3288F PR FALLS RISK ASSESSMENT DOCUMENTED: ICD-10-PCS | Mod: CPTII,S$GLB,, | Performed by: PHYSICIAN ASSISTANT

## 2022-07-18 PROCEDURE — 3288F FALL RISK ASSESSMENT DOCD: CPT | Mod: CPTII,S$GLB,, | Performed by: PHYSICIAN ASSISTANT

## 2022-07-18 PROCEDURE — U0003 INFECTIOUS AGENT DETECTION BY NUCLEIC ACID (DNA OR RNA); SEVERE ACUTE RESPIRATORY SYNDROME CORONAVIRUS 2 (SARS-COV-2) (CORONAVIRUS DISEASE [COVID-19]), AMPLIFIED PROBE TECHNIQUE, MAKING USE OF HIGH THROUGHPUT TECHNOLOGIES AS DESCRIBED BY CMS-2020-01-R: HCPCS | Performed by: PHYSICIAN ASSISTANT

## 2022-07-18 PROCEDURE — 3078F PR MOST RECENT DIASTOLIC BLOOD PRESSURE < 80 MM HG: ICD-10-PCS | Mod: CPTII,S$GLB,, | Performed by: PHYSICIAN ASSISTANT

## 2022-07-18 PROCEDURE — 99214 PR OFFICE/OUTPT VISIT, EST, LEVL IV, 30-39 MIN: ICD-10-PCS | Mod: S$GLB,,, | Performed by: PHYSICIAN ASSISTANT

## 2022-07-18 PROCEDURE — 71250 CT CHEST WITHOUT CONTRAST: ICD-10-PCS | Mod: 26,,, | Performed by: RADIOLOGY

## 2022-07-18 PROCEDURE — U0005 INFEC AGEN DETEC AMPLI PROBE: HCPCS | Performed by: PHYSICIAN ASSISTANT

## 2022-07-18 PROCEDURE — 71250 CT THORAX DX C-: CPT | Mod: TC

## 2022-07-18 PROCEDURE — 99999 PR PBB SHADOW E&M-EST. PATIENT-LVL IV: ICD-10-PCS | Mod: PBBFAC,,, | Performed by: PHYSICIAN ASSISTANT

## 2022-07-18 PROCEDURE — 3074F SYST BP LT 130 MM HG: CPT | Mod: CPTII,S$GLB,, | Performed by: PHYSICIAN ASSISTANT

## 2022-07-18 RX ORDER — LIDOCAINE 50 MG/G
1 PATCH TOPICAL DAILY
Qty: 15 PATCH | Refills: 0 | Status: SHIPPED | OUTPATIENT
Start: 2022-07-18 | End: 2022-08-02

## 2022-07-18 NOTE — PROGRESS NOTES
"INTERNAL MEDICINE URGENT VISIT NOTE    CHIEF COMPLAINT     Chief Complaint   Patient presents with    Rib Injury       HPI     Garry Rooney is a 81 y.o. male who presents for an urgent visit today.    PCP is Shiv Trejo MD, patient is new to me.     Patient presents with complaints of anterior chest wall pain for the past 17 days. He was on a trip with his wife when she fell - he tried to help her up. She was in a great deal of pain so she was "hysterical and flailing" may have hit his chest. He reports that ever since this event he has had significant chest wall pain that is worse with palpation, arm elevation, deep breathing. He does report SOB that is more pronounced that normal. He is dependent on home O2 and admits that he is having to wear O2 more frequently than before this event. He denies nausea, vomiting, fever, chills, dizziness, AMS. He is accompanied by his son in the office today.     Of note, his wife is currently [place] at [facility] [place] to repair a pelvic fx as a result of this injury - she is also incidentally COVID positive.     Patient is vaccinated and boosted for COVID 19.     Past Medical History:  Past Medical History:   Diagnosis Date    Anemia     Bladder cancer     COPD (chronic obstructive pulmonary disease)     Crohn's disease     Depression     Encounter for long-term (current) use of high-risk medication     Hypertension     Lumbar spondylosis 11/2/2020    Melanoma     Osteoporosis, unspecified        Home Medications:  Prior to Admission medications    Medication Sig Start Date End Date Taking? Authorizing Provider   adalimumab (HUMIRA PEN) PnKt injection Inject 1 pen (40 mg total) into the skin every 7 days. PEN kit 3/14/22 3/14/23 Yes Julio César Lackey MD   azaTHIOprine (IMURAN) 50 mg Tab TAKE 5 TABLETS ONE TIME DAILY 3/14/22  Yes Julio César Lackey MD   balsalazide (COLAZAL) 750 mg capsule Take 3 capsules (2,250 mg total) by mouth 3 (three) times daily. " 4/29/22  Yes Rodo Barbosa MD   ciprofloxacin HCl (CIPRO) 500 MG tablet TAKE 1 TABLET TWICE DAILY 6/14/22  Yes Julio César Lackey MD   finasteride (PROSCAR) 5 mg tablet Take 1 tablet (5 mg total) by mouth once daily. 5/31/22  Yes Shiv Trejo MD   hyoscyamine (LEVBID) 0.375 mg Tb12 Take 1 tablet (0.375 mg total) by mouth every 12 (twelve) hours. 4/29/22  Yes Rodo Barbosa MD   naloxone (NARCAN) 4 mg/actuation Spry 4mg by nasal route as needed for opioid overdose; may repeat every 2-3 minutes in alternating nostrils until medical help arrives. Call 911 6/24/22  Yes Rj Mccarthy MD   oxyCODONE-acetaminophen (PERCOCET)  mg per tablet Take 1 tablet by mouth every 8 (eight) hours as needed for Pain. 6/24/22 7/24/22 Yes Rj Mccarthy MD   oxyCODONE-acetaminophen (PERCOCET)  mg per tablet Take 1 tablet by mouth every 8 (eight) hours as needed for Pain. 7/23/22 8/22/22 Yes Rj Mccarthy MD   oxyCODONE-acetaminophen (PERCOCET)  mg per tablet Take 1 tablet by mouth every 8 (eight) hours as needed for Pain. 8/22/22 9/21/22 Yes Rj Mccarthy MD   valsartan (DIOVAN) 40 MG tablet TAKE 1 TABLET EVERY DAY 6/1/22  Yes Shiv Trejo MD   venlafaxine (EFFEXOR-XR) 75 MG 24 hr capsule TAKE 1 CAPSULE TWICE DAILY 6/1/22  Yes Shiv Trejo MD   ASACOL  mg TbEC Take 1 tablet (800 mg total) by mouth 3 (three) times daily.  Patient not taking: No sig reported 2/10/20   Julio César Lackey MD       Review of Systems:  Review of Systems   Constitutional: Negative for chills and fever.   HENT: Negative for sore throat and trouble swallowing.    Eyes: Negative for visual disturbance.   Respiratory: Negative for cough and shortness of breath.    Cardiovascular: Negative for chest pain.   Gastrointestinal: Negative for abdominal pain, constipation, diarrhea, nausea and vomiting.   Genitourinary: Negative for dysuria and flank pain.   Musculoskeletal: Negative for back pain, neck pain and  neck stiffness.        Left an right rib line TTP    Skin: Negative for rash.   Neurological: Negative for dizziness, syncope, weakness and headaches.   Psychiatric/Behavioral: Negative for confusion.       Health Maintainence:   Immunizations:  Health Maintenance       Date Due Completion Date    Shingles Vaccine (1 of 2) Never done ---    Pneumococcal Vaccines (Age 65+) (2 - PPSV23 or PCV20) 04/06/2017 4/6/2016    Colonoscopy 02/16/2018 2/16/2017    COVID-19 Vaccine (3 - Booster) 07/03/2021 2/3/2021    TETANUS VACCINE 12/09/2021 12/9/2011    Influenza Vaccine (1) 09/01/2022 12/18/2019 (Declined)    Override on 12/18/2019: Declined    Lipid Panel 04/20/2026 4/20/2021           PHYSICAL EXAM     BP (!) 114/58 (BP Location: Right arm, Patient Position: Sitting)   Pulse 102   Wt 54 kg (119 lb 0.8 oz)   SpO2 96%   BMI 20.43 kg/m²     Physical Exam  Vitals and nursing note reviewed.   Constitutional:       Appearance: Normal appearance.      Comments: Frail elderly male in NAD or apparent pain when resting. He winces in pain    HENT:      Head: Normocephalic and atraumatic.      Nose: Nose normal.      Mouth/Throat:      Pharynx: Oropharynx is clear.   Eyes:      Conjunctiva/sclera: Conjunctivae normal.   Cardiovascular:      Rate and Rhythm: Normal rate and regular rhythm.      Pulses: Normal pulses.   Pulmonary:      Effort: No respiratory distress.   Abdominal:      Tenderness: There is no abdominal tenderness.   Musculoskeletal:         General: Normal range of motion.      Cervical back: No rigidity.   Skin:     General: Skin is warm and dry.      Capillary Refill: Capillary refill takes less than 2 seconds.      Findings: No rash.   Neurological:      General: No focal deficit present.      Mental Status: He is alert.      Gait: Gait normal.   Psychiatric:         Mood and Affect: Mood normal.         LABS     Lab Results   Component Value Date    HGBA1C 5.7 (H) 04/20/2021     CMP  Sodium   Date Value Ref  Range Status   04/29/2022 145 136 - 145 mmol/L Final     Potassium   Date Value Ref Range Status   04/29/2022 5.0 3.5 - 5.1 mmol/L Final     Chloride   Date Value Ref Range Status   04/29/2022 104 95 - 110 mmol/L Final     CO2   Date Value Ref Range Status   04/29/2022 27 23 - 29 mmol/L Final     Glucose   Date Value Ref Range Status   04/29/2022 97 70 - 110 mg/dL Final     BUN   Date Value Ref Range Status   04/29/2022 9 8 - 23 mg/dL Final     Creatinine   Date Value Ref Range Status   04/29/2022 0.6 0.5 - 1.4 mg/dL Final     Calcium   Date Value Ref Range Status   04/29/2022 10.3 8.7 - 10.5 mg/dL Final     Total Protein   Date Value Ref Range Status   04/29/2022 8.6 (H) 6.0 - 8.4 g/dL Final     Albumin   Date Value Ref Range Status   04/29/2022 4.0 3.5 - 5.2 g/dL Final     Total Bilirubin   Date Value Ref Range Status   04/29/2022 0.5 0.1 - 1.0 mg/dL Final     Comment:     For infants and newborns, interpretation of results should be based  on gestational age, weight and in agreement with clinical  observations.    Premature Infant recommended reference ranges:  Up to 24 hours.............<8.0 mg/dL  Up to 48 hours............<12.0 mg/dL  3-5 days..................<15.0 mg/dL  6-29 days.................<15.0 mg/dL       Alkaline Phosphatase   Date Value Ref Range Status   04/29/2022 331 (H) 55 - 135 U/L Final     AST   Date Value Ref Range Status   04/29/2022 25 10 - 40 U/L Final     ALT   Date Value Ref Range Status   04/29/2022 8 (L) 10 - 44 U/L Final     Anion Gap   Date Value Ref Range Status   04/29/2022 14 8 - 16 mmol/L Final     eGFR if    Date Value Ref Range Status   04/29/2022 >60.0 >60 mL/min/1.73 m^2 Final     eGFR if non    Date Value Ref Range Status   04/29/2022 >60.0 >60 mL/min/1.73 m^2 Final     Comment:     Calculation used to obtain the estimated glomerular filtration  rate (eGFR) is the CKD-EPI equation.        Lab Results   Component Value Date    WBC 5.95  04/29/2022    HGB 12.1 (L) 04/29/2022    HCT 38.5 (L) 04/29/2022     (H) 04/29/2022     04/29/2022     Lab Results   Component Value Date    CHOL 124 04/20/2021    CHOL 209 (H) 01/20/2020    CHOL 173 10/28/2011     Lab Results   Component Value Date    HDL 50 04/20/2021    HDL 49 01/20/2020    HDL 38 (L) 10/28/2011     Lab Results   Component Value Date    LDLCALC 62.0 (L) 04/20/2021    LDLCALC 140.6 01/20/2020    LDLCALC 115.0 10/28/2011     Lab Results   Component Value Date    TRIG 60 04/20/2021    TRIG 97 01/20/2020    TRIG 101 10/28/2011     Lab Results   Component Value Date    CHOLHDL 40.3 04/20/2021    CHOLHDL 23.4 01/20/2020    CHOLHDL 22.0 10/28/2011     Lab Results   Component Value Date    TSH 0.577 04/20/2021       ASSESSMENT/PLAN     Garry Rooney is a 81 y.o. male     Garry was seen today for rib injury with evidence of subacute fx without complication seen on non-con CT. Will treat symptomatically. COVID test pending.     Diagnoses and all orders for this visit:    Rib pain  -     Cancel: X-Ray Chest PA And Lateral; Future  -     CT Chest Without Contrast; Future  -     LIDOcaine (LIDODERM) 5 %; Place 1 patch onto the skin once daily. Remove & Discard patch within 12 hours or as directed by MD for 15 days    SOB (shortness of breath)  -     CT Chest Without Contrast; Future    Shortness of breath  -     COVID-19 Routine Screening    Closed fracture of multiple ribs of right side, initial encounter       Patient was counseled on when and how to seek emergent care.       Rekha Ryan PA-C   Department of Internal Medicine - [facility]   12:36 PM

## 2022-07-19 ENCOUNTER — PATIENT MESSAGE (OUTPATIENT)
Dept: INTERNAL MEDICINE | Facility: CLINIC | Age: 82
End: 2022-07-19
Payer: MEDICARE

## 2022-07-19 ENCOUNTER — TELEPHONE (OUTPATIENT)
Dept: INTERNAL MEDICINE | Facility: CLINIC | Age: 82
End: 2022-07-19
Payer: MEDICARE

## 2022-07-19 DIAGNOSIS — U07.1 COVID: Primary | ICD-10-CM

## 2022-07-19 NOTE — TELEPHONE ENCOUNTER
----- Message from Pat Raphael sent at 7/19/2022  8:50 AM CDT -----  Regarding: concerns  Name of Who is Calling: campbell Castañeda JR           What is the request in detail: Garry is requesting a call back from ELA Ryan to get a follow up on patient's care.           Can the clinic reply by [facility]: No           What Number to Call Back if not in [facility]: [phone]

## 2022-07-20 ENCOUNTER — TELEPHONE (OUTPATIENT)
Dept: INFECTIOUS DISEASES | Facility: HOSPITAL | Age: 82
End: 2022-07-20
Payer: MEDICARE

## 2022-07-20 ENCOUNTER — INFUSION (OUTPATIENT)
Dept: INFECTIOUS DISEASES | Facility: HOSPITAL | Age: 82
End: 2022-07-20
Attending: INTERNAL MEDICINE
Payer: MEDICARE

## 2022-07-20 VITALS
TEMPERATURE: 98 F | WEIGHT: 120 LBS | HEART RATE: 94 BPM | OXYGEN SATURATION: 98 % | RESPIRATION RATE: 25 BRPM | DIASTOLIC BLOOD PRESSURE: 53 MMHG | SYSTOLIC BLOOD PRESSURE: 91 MMHG | BODY MASS INDEX: 20.49 KG/M2 | HEIGHT: 64 IN

## 2022-07-20 DIAGNOSIS — U07.1 COVID: ICD-10-CM

## 2022-07-20 DIAGNOSIS — U07.1 COVID-19: Primary | ICD-10-CM

## 2022-07-20 PROCEDURE — M0222 HC IV INJECTION, BEBTELOVIMAB, INCL POST ADMIN MONIT: HCPCS | Performed by: INTERNAL MEDICINE

## 2022-07-20 PROCEDURE — 63600175 PHARM REV CODE 636 W HCPCS: Performed by: INTERNAL MEDICINE

## 2022-07-20 RX ORDER — ACETAMINOPHEN 325 MG/1
650 TABLET ORAL
Status: ACTIVE | OUTPATIENT
Start: 2022-07-20 | End: 2022-07-21

## 2022-07-20 RX ORDER — DIPHENHYDRAMINE HYDROCHLORIDE 50 MG/ML
25 INJECTION INTRAMUSCULAR; INTRAVENOUS
Status: ACTIVE | OUTPATIENT
Start: 2022-07-20 | End: 2022-07-21

## 2022-07-20 RX ORDER — ONDANSETRON 4 MG/1
4 TABLET, ORALLY DISINTEGRATING ORAL
Status: ACTIVE | OUTPATIENT
Start: 2022-07-20 | End: 2022-07-21

## 2022-07-20 RX ORDER — BEBTELOVIMAB 87.5 MG/ML
175 INJECTION, SOLUTION INTRAVENOUS
Status: COMPLETED | OUTPATIENT
Start: 2022-07-20 | End: 2022-07-20

## 2022-07-20 RX ORDER — EPINEPHRINE 0.3 MG/.3ML
0.3 INJECTION SUBCUTANEOUS
Status: ACTIVE | OUTPATIENT
Start: 2022-07-20 | End: 2022-07-23

## 2022-07-20 RX ORDER — ALBUTEROL SULFATE 90 UG/1
2 AEROSOL, METERED RESPIRATORY (INHALATION)
Status: ACTIVE | OUTPATIENT
Start: 2022-07-20 | End: 2022-07-23

## 2022-07-20 RX ADMIN — BEBTELOVIMAB 175 MG: 87.5 INJECTION, SOLUTION INTRAVENOUS at 11:07

## 2022-07-20 NOTE — TELEPHONE ENCOUNTER
Called primary number which was Ms. Rooney, she told me to call the secondary number which will be directly to Mr. Rooney as she is about to go into surgery.     Called Mr. Mao  Patient was very unhappy and angered.   He states he is coming in for a COVID-19 antibody infusion. The EUA schedule does not have him on schedule today yet, told pt that I was currently calling him to set up that appointment. He refused to answer any questions or set up the appointment and told me to go call his wife again. Found son number in notes, tired calling him multiple times but is unable to reach.

## 2022-07-20 NOTE — TELEPHONE ENCOUNTER
Garry Rooney (Galdamez: [id number]) - [id number]  Lidocaine 5% patches       View in Med Check  Status: PA Request    Created: July 18th, 2022 [phone]    Sent: July 20th, 2022

## 2022-07-20 NOTE — PROGRESS NOTES
Patient arrives for Bebtelovimab IV Injection. Ambulatory. Pt AAox3. No distress noted. RR even and unlabored.     Symptoms and onset date:  07/15/22 chest pain, cold symptoms. Flu symptoms, GI problems     Tested COVID + on 07/18/22

## 2022-07-20 NOTE — PROGRESS NOTES
Patient remains with no signs of complications noted. Patient received Bebtelovimab IV Injection according to FDA recommendations and [facility] SOP without complications noted and left with mask in place. Drug fact sheet provided. Pt discharged home. Ambulatory. Remains AAox3. No distress noted. RR even and unlabored.

## 2022-07-22 NOTE — TELEPHONE ENCOUNTER
Garry Rooney (Galdamez: [id number]) - [id number]  Lidocaine 5% patches       Status: PA Response - Denied    Created: July 18th, 2022 [phone]    Sent: July 20th, 2022        Denied on July 20  You asked for the drug listed above for your Rib pain. Mable follows Medicare rules. The Medicare rule in the Prescription Drug Manual (Chapter 6, Section 10.6) says an off-label use of a drug is a use that is not included on the drugs label as approved by the U.S. Food and Drug Administration (FDA). FDA approved drugs used for a disease other than what is on the official label may be covered under Medicare if [employer] determines the use to be medically accepted. [employer] makes these decisions on a case-by-case basis. We take into consideration the two major drug compendia. These compendia (or drug guides) are called the DRUGDEX Information System and the [facility] Form[facility] Drug Information (AHFS-DI). Off-label use is medically accepted when there is evidence in one or more of the compendia that it works for the disease in question. [employer] has determined that the off-label use of this drug for your condition is not medically accepted per Medicare rules and isn&apos;t covered based on available information.    Drug  Lidocaine 5% patches

## 2022-08-09 ENCOUNTER — PATIENT MESSAGE (OUTPATIENT)
Dept: INTERNAL MEDICINE | Facility: CLINIC | Age: 82
End: 2022-08-09
Payer: MEDICARE

## 2022-08-09 ENCOUNTER — TELEPHONE (OUTPATIENT)
Dept: INTERNAL MEDICINE | Facility: CLINIC | Age: 82
End: 2022-08-09
Payer: MEDICARE

## 2022-08-09 NOTE — TELEPHONE ENCOUNTER
Spoke with pt wife to squeeze pt in on today but pt wife said that pt can not get there today because she's in PT and is only aware that patient had the symptoms that she knew of when she sent the PAR message. She said that she'll will just bring pt to the urgent care and thir son will be coming down from [place] to help with pt.     Dr. Trejo informed and suggested is there anyone else that can see pt here at [facility] but the next available slot isn't until 8/15/2022 and this was too urgent for me to call pt wife back and asked them to come in for next Monday.

## 2022-08-10 ENCOUNTER — HOSPITAL ENCOUNTER (EMERGENCY)
Facility: OTHER | Age: 82
Discharge: HOME | End: 2022-08-10
Attending: EMERGENCY MEDICINE
Payer: MEDICARE

## 2022-08-10 VITALS
HEART RATE: 93 BPM | SYSTOLIC BLOOD PRESSURE: 117 MMHG | RESPIRATION RATE: 19 BRPM | BODY MASS INDEX: 18.33 KG/M2 | TEMPERATURE: 98 F | DIASTOLIC BLOOD PRESSURE: 58 MMHG | WEIGHT: 110 LBS | OXYGEN SATURATION: 97 % | HEIGHT: 65 IN

## 2022-08-10 DIAGNOSIS — I95.9 HYPOTENSION: ICD-10-CM

## 2022-08-10 DIAGNOSIS — S00.83XA FACIAL HEMATOMA, INITIAL ENCOUNTER: Primary | ICD-10-CM

## 2022-08-10 DIAGNOSIS — E86.0 DEHYDRATION: ICD-10-CM

## 2022-08-10 LAB
ANION GAP SERPL CALC-SCNC: 15 MMOL/L (ref 8–16)
BUN SERPL-MCNC: 55 MG/DL (ref 8–23)
CALCIUM SERPL-MCNC: 9.1 MG/DL (ref 8.7–10.5)
CHLORIDE SERPL-SCNC: 99 MMOL/L (ref 95–110)
CO2 SERPL-SCNC: 23 MMOL/L (ref 23–29)
CREAT SERPL-MCNC: 1.3 MG/DL (ref 0.5–1.4)
EST. GFR  (NO RACE VARIABLE): 55 ML/MIN/1.73 M^2
GLUCOSE SERPL-MCNC: 142 MG/DL (ref 70–110)
POTASSIUM SERPL-SCNC: 5.6 MMOL/L (ref 3.5–5.1)
SODIUM SERPL-SCNC: 137 MMOL/L (ref 136–145)

## 2022-08-10 PROCEDURE — 93010 ELECTROCARDIOGRAM REPORT: CPT | Mod: ,,, | Performed by: INTERNAL MEDICINE

## 2022-08-10 PROCEDURE — 93005 ELECTROCARDIOGRAM TRACING: CPT

## 2022-08-10 PROCEDURE — 99285 EMERGENCY DEPT VISIT HI MDM: CPT | Mod: 25

## 2022-08-10 PROCEDURE — 25000003 PHARM REV CODE 250: Performed by: EMERGENCY MEDICINE

## 2022-08-10 PROCEDURE — 96360 HYDRATION IV INFUSION INIT: CPT

## 2022-08-10 PROCEDURE — 80048 BASIC METABOLIC PNL TOTAL CA: CPT | Performed by: EMERGENCY MEDICINE

## 2022-08-10 PROCEDURE — 93010 EKG 12-LEAD: ICD-10-PCS | Mod: ,,, | Performed by: INTERNAL MEDICINE

## 2022-08-10 RX ORDER — SODIUM CHLORIDE 9 MG/ML
1000 INJECTION, SOLUTION INTRAVENOUS
Status: COMPLETED | OUTPATIENT
Start: 2022-08-10 | End: 2022-08-10

## 2022-08-10 RX ADMIN — SODIUM CHLORIDE 1000 ML: 0.9 INJECTION, SOLUTION INTRAVENOUS at 10:08

## 2022-08-10 NOTE — ED NOTES
Himanshu at bedside discussing imaging results and updated plan of care. NAD noted. SR x 2, continuous pulse-ox and BP set to cycle q 30 min.

## 2022-08-10 NOTE — ED TRIAGE NOTES
Chief Complaint   Patient presents with    Hypotension     In triage hypotension noted. Systolic in the 80s. Pt states normal BP 120s systolic. Pt denies CP, SOB and dizziness.    Facial Injury     Pt states he hit left side of jaw on a door 3 days ago and swelling to left jaw has worsened and hardened. Large mass noted to left jaw. No bruising or lac noted. Blood thinners unknown.     Facial Swelling     Pt presents to ED with hypotension 87/52. Pt states normal BP is 120's systolic. Denies cardiac symptoms. Pt hit left side of of jaw on a door 3x days ago. Large swollen mass noted to left jaw. Pt states he's not on blood thinners, hx of chrones and AAOX4. Pt speaking in clear sentences. Pt on 2L NC intermittent as needed. Son at the bedside.

## 2022-08-10 NOTE — ED PROVIDER NOTES
"SCRIBE #1 NOTE: I, Tatiana Blanchard, am scribing for, and in the presence of, Shawn Downs DO.         Source of History:  The patient.    Chief complaint:  Hypotension (In triage hypotension noted. Systolic in the 80s. Pt states normal BP 120s systolic. Pt denies CP, SOB and dizziness.), Facial Injury (Pt states he hit left side of jaw on a door 3 days ago and swelling to left jaw has worsened and hardened. Large mass noted to left jaw. No bruising or lac noted. Blood thinners unknown. ), and Facial Swelling      HPI:  Garry Rooney is a 81 y.o. male presenting with facial swelling and associated pain after an injury. The patient reports he hit his left jaw on a doorframe three days ago. He states he has been experiencing facial swelling in that area since, with some improvement today. He notes a grinding/clicking noise when he opens his mouth. He denies chest pain, shortness of breath, nausea, or vomiting. The patient has a PMHx of COPD, Crohn's disease, and hypertension, and states his normal systolic blood pressure is in the 120's. He notes he does not take any blood thinners.    This is the extent to the patients complaints today here in the emergency department.    ROS: As per HPI and below:  Constitutional: No fever.  No chills.  Eyes: No visual changes.  ENT: No sore throat. No ear pain. Notes grinding/clicking near left jaw.    Cardiovascular: No chest pain.  Respiratory: No shortness of breath.  GI: No abdominal pain.  No nausea or vomiting.  Genitourinary: No abnormal urination.  Neurologic: No headache. No focal weakness.  No numbness.  MSK: no back pain.  Integument: No rashes or lesions. Notes left facial swelling with associated pain.  Hematologic: No easy bruising.  Endocrine: No excessive thirst or urination.    Review of patient's allergies indicates:   Allergen Reactions    Fosamax [alendronate]     Reclast [zoledronic acid-mannitol-water]     Sesame seed Other (See Comments)     "Can't " "breathe"    Sulfa (sulfonamide antibiotics)        PMH:  As per HPI and below:  Past Medical History:   Diagnosis Date    Anemia     Bladder cancer     COPD (chronic obstructive pulmonary disease)     Crohn's disease     Depression     Encounter for long-term (current) use of high-risk medication     Hypertension     Lumbar spondylosis 11/2/2020    Melanoma     Osteoporosis, unspecified      Past Surgical History:   Procedure Laterality Date    APPENDECTOMY      COLON SURGERY      2002    COLONOSCOPY      2010    COLONOSCOPY N/A 10/15/2015    Procedure: COLONOSCOPY;  Surgeon: Julio César Lackey MD;  [facility]  Service: Endoscopy;  Laterality: N/A;    COLONOSCOPY N/A 2/16/2017    Procedure: COLONOSCOPY;  Surgeon: Julio César Lackey MD;  [facility]  Service: Endoscopy;  Laterality: N/A;    HERNIA REPAIR         Social History     Tobacco Use    Smoking status: Never Smoker    Smokeless tobacco: Never Used   Substance Use Topics    Alcohol use: Yes     Comment: ocassionally - rarely    Drug use: No       Physical Exam:    /60   Pulse 91   Temp 97.7 °F (36.5 °C) (Oral)   Resp 20   Ht 5' 5" (1.651 m)   Wt 49.9 kg (110 lb)   SpO2 97%   BMI 18.30 kg/m²   Nursing note and vital signs reviewed.  Constitutional: No acute distress. Chronically ill appearing.  Eyes: No conjunctival injection. Extraocular muscles are intact.  ENT: Oropharynx clear.  Normal phonation. Large indurated area over left mandible, nontender to palpation. No malocclusion or trismus.  Cardiovascular: Regular rate and rhythm.  No murmurs. No gallops. No rubs. Respiratory: Clear to auscultation bilaterally.  Good air movement.  No wheezes.  No rhonchi. No rales. No accessory muscle use.  Abdomen: Soft.  Not distended.  Nontender.  No guarding.  No rebound. Non-peritoneal.  Musculoskeletal: Good range of motion all joints.  No deformities.  Neck supple.  No meningismus.  Skin: No rashes " seen.  Good turgor.  No abrasions.  No ecchymoses.  Neurologic: Motor intact.  Sensation intact.  No ataxia. No focal neurological deficits.  Psych: Appropriate, conversant    Labs that have been ordered have been independently reviewed and interpreted by myself.    I decided to obtain the patient's medical records.  Summary of Medical Records:      MDM/ Differential Dx:    81 y.o. male with swelling to left jaw after injury.  States the swelling has improved but want to get evaluated.  Noticed a clicking area on the left side of the jaw.  Will get a CT maxillofacial rule out fracture or TMJ dislocation although there is no clinical evidence of this.  Blood pressure is also low.  States he is normally 120/80.  Possible dehydration.  Is chronically ill-appearing.  Will give IV fluids and get chemistries to rule out acute kidney injury metabolic derangement.    ED Course as of 08/10/22 1206   Wed Aug 10, 2022   1027 BP(!): 87/52  3 weeks ago at visit for EUA infusion BP 91/57 [SM]   1120 Creatinine: 1.3  Baseline 0.6.  Slight dehydration/NOHELIA. [SM]   1121 Potassium(!): 5.6  I suspect hemolysis.  Will get an EKG to evaluate for any evidence of hyperkalemia. Cr is WNL. [SM]   1128 EKG 12-lead  EKG independently interpreted by myself shows normal sinus rhythm at a rate of 93, normal intervals, widened QRS consistent with the a conduction block, no acute ST T wave abnormalities.  Compared to an EKG on August 30, 2013 shows no significant change. [SM]   1128 CT Maxillofacial Without Contrast  CT scan shows complex fluid collection along the left aspect of the face around the mandible.  No underlying fracture seen.  Suspicious of hematoma in the setting of trauma. [SM]      ED Course User Index  [SM] Shawn Downs DO                Scribe Attestation:   Scribe #1: I performed the above scribed service and the documentation accurately describes the services I performed. I attest to the accuracy of the  note.    Physician Attestation for Scribe: I, Dr Shawn Downs, reviewed documentation as scribed in my presence, which is both accurate and complete.    Diagnostic Impression:    1. Facial hematoma, initial encounter    2. Hypotension    3. Dehydration         ED Disposition Condition    Discharge Stable          ED Prescriptions     None        Follow-up Information     Follow up With Specialties Details Why Contact Info    Shiv Trejo MD Internal Medicine  As needed [address]  [address]  [place] [address]  [phone]      Evan Zuniga MD Plastic Surgery, Oral and Maxillofacial Surgery, Oral Surgery  if clicking in the jaw continues [address]  [address]RD FLO[address]  [place] [address]  [phone]             Shawn Downs, DO  08/10/22 1206

## 2022-09-13 ENCOUNTER — PATIENT MESSAGE (OUTPATIENT)
Dept: INTERNAL MEDICINE | Facility: CLINIC | Age: 82
End: 2022-09-13
Payer: MEDICARE

## 2022-09-16 ENCOUNTER — TELEPHONE (OUTPATIENT)
Dept: INTERNAL MEDICINE | Facility: CLINIC | Age: 82
End: 2022-09-16
Payer: MEDICARE

## 2022-09-16 NOTE — TELEPHONE ENCOUNTER
Called pt per Dr. Trejo for pt oxygen form to get pt oxygen liters in order to complete form Spoke with pt wife  and she gave me 2 liters per min.      Faxed [employer]  to fax# [phone]/office# [phone] for at home continous flow stationary concentrator. I  Have attached a confirmation sheet to this fax located in my file cabinet.

## 2022-09-19 ENCOUNTER — TELEPHONE (OUTPATIENT)
Dept: INTERNAL MEDICINE | Facility: CLINIC | Age: 82
End: 2022-09-19
Payer: MEDICARE

## 2022-09-19 NOTE — TELEPHONE ENCOUNTER
"----- Message from Vero Saima sent at 9/19/2022 12:22 PM CDT -----  Regarding: Reynaldo Briggs" : [phone]  .Type: Patient Call Back    Who called Reynaldo Briggs"    What is the request in detail: A fax was sent over on the 13th and requesting to verify the status for oxygen     Can the clinic reply by [facility]? Call back    Would the patient rather a call back or a response via [facility]?  Call back    Best call back number: [phone]        "

## 2022-09-20 NOTE — TELEPHONE ENCOUNTER
LVM FOR BELOW:      Actual date 9/20/20222 Faxed [employer]  to fax# [phone]/office# [phone] for at home continous flow stationary concentrator. I  Have attached a confirmation sheet to this fax located in my file cabinet.

## 2022-10-05 ENCOUNTER — TELEPHONE (OUTPATIENT)
Dept: INTERNAL MEDICINE | Facility: CLINIC | Age: 82
End: 2022-10-05
Payer: MEDICARE

## 2022-10-06 ENCOUNTER — LAB VISIT (OUTPATIENT)
Dept: LAB | Facility: OTHER | Age: 82
End: 2022-10-06
Attending: INTERNAL MEDICINE
Payer: MEDICARE

## 2022-10-06 ENCOUNTER — IMMUNIZATION (OUTPATIENT)
Dept: INTERNAL MEDICINE | Facility: CLINIC | Age: 82
End: 2022-10-06
Payer: MEDICARE

## 2022-10-06 ENCOUNTER — OFFICE VISIT (OUTPATIENT)
Dept: INTERNAL MEDICINE | Facility: CLINIC | Age: 82
End: 2022-10-06
Attending: INTERNAL MEDICINE
Payer: MEDICARE

## 2022-10-06 VITALS — SYSTOLIC BLOOD PRESSURE: 114 MMHG | BODY MASS INDEX: 18.3 KG/M2 | DIASTOLIC BLOOD PRESSURE: 80 MMHG | HEIGHT: 65 IN

## 2022-10-06 DIAGNOSIS — Z12.5 ENCOUNTER FOR SCREENING FOR MALIGNANT NEOPLASM OF PROSTATE: ICD-10-CM

## 2022-10-06 DIAGNOSIS — R97.20 ELEVATED PSA: ICD-10-CM

## 2022-10-06 DIAGNOSIS — N40.0 BENIGN PROSTATIC HYPERPLASIA WITHOUT LOWER URINARY TRACT SYMPTOMS: ICD-10-CM

## 2022-10-06 DIAGNOSIS — Z23 NEED FOR VACCINATION: Primary | ICD-10-CM

## 2022-10-06 DIAGNOSIS — I10 ESSENTIAL HYPERTENSION: ICD-10-CM

## 2022-10-06 DIAGNOSIS — R63.4 WEIGHT LOSS: ICD-10-CM

## 2022-10-06 DIAGNOSIS — R07.89 OTHER CHEST PAIN: ICD-10-CM

## 2022-10-06 DIAGNOSIS — F32.5 MAJOR DEPRESSIVE DISORDER WITH SINGLE EPISODE, IN FULL REMISSION: ICD-10-CM

## 2022-10-06 DIAGNOSIS — Z79.899 DRUG-INDUCED IMMUNODEFICIENCY: ICD-10-CM

## 2022-10-06 DIAGNOSIS — Z12.11 COLON CANCER SCREENING: ICD-10-CM

## 2022-10-06 DIAGNOSIS — R63.4 WEIGHT LOSS, UNINTENTIONAL: Primary | ICD-10-CM

## 2022-10-06 DIAGNOSIS — D84.821 DRUG-INDUCED IMMUNODEFICIENCY: ICD-10-CM

## 2022-10-06 DIAGNOSIS — C67.9 MALIGNANT NEOPLASM OF URINARY BLADDER, UNSPECIFIED SITE: ICD-10-CM

## 2022-10-06 DIAGNOSIS — Z85.51 HISTORY OF BLADDER CANCER: ICD-10-CM

## 2022-10-06 LAB
ALBUMIN SERPL BCP-MCNC: 2.8 G/DL (ref 3.5–5.2)
ALP SERPL-CCNC: 1987 U/L (ref 55–135)
ALT SERPL W/O P-5'-P-CCNC: 16 U/L (ref 10–44)
ANION GAP SERPL CALC-SCNC: 10 MMOL/L (ref 8–16)
AST SERPL-CCNC: 62 U/L (ref 10–40)
BASOPHILS # BLD AUTO: 0.03 K/UL (ref 0–0.2)
BASOPHILS NFR BLD: 0.4 % (ref 0–1.9)
BILIRUB SERPL-MCNC: 0.5 MG/DL (ref 0.1–1)
BUN SERPL-MCNC: 50 MG/DL (ref 8–23)
CALCIUM SERPL-MCNC: 8.9 MG/DL (ref 8.7–10.5)
CHLORIDE SERPL-SCNC: 102 MMOL/L (ref 95–110)
CO2 SERPL-SCNC: 25 MMOL/L (ref 23–29)
COMPLEXED PSA SERPL-MCNC: >1000 NG/ML (ref 0–4)
CREAT SERPL-MCNC: 1 MG/DL (ref 0.5–1.4)
DIFFERENTIAL METHOD: ABNORMAL
EOSINOPHIL # BLD AUTO: 0 K/UL (ref 0–0.5)
EOSINOPHIL NFR BLD: 0.5 % (ref 0–8)
ERYTHROCYTE [DISTWIDTH] IN BLOOD BY AUTOMATED COUNT: 21.5 % (ref 11.5–14.5)
EST. GFR  (NO RACE VARIABLE): >60 ML/MIN/1.73 M^2
ESTIMATED AVG GLUCOSE: 117 MG/DL (ref 68–131)
GLUCOSE SERPL-MCNC: 152 MG/DL (ref 70–110)
HBA1C MFR BLD: 5.7 % (ref 4–5.6)
HCT VFR BLD AUTO: 29.4 % (ref 40–54)
HGB BLD-MCNC: 9.5 G/DL (ref 14–18)
IMM GRANULOCYTES # BLD AUTO: 0.38 K/UL (ref 0–0.04)
IMM GRANULOCYTES NFR BLD AUTO: 5 % (ref 0–0.5)
LYMPHOCYTES # BLD AUTO: 1.5 K/UL (ref 1–4.8)
LYMPHOCYTES NFR BLD: 19 % (ref 18–48)
MCH RBC QN AUTO: 32.4 PG (ref 27–31)
MCHC RBC AUTO-ENTMCNC: 32.3 G/DL (ref 32–36)
MCV RBC AUTO: 100 FL (ref 82–98)
MONOCYTES # BLD AUTO: 0.9 K/UL (ref 0.3–1)
MONOCYTES NFR BLD: 12 % (ref 4–15)
NEUTROPHILS # BLD AUTO: 4.8 K/UL (ref 1.8–7.7)
NEUTROPHILS NFR BLD: 63.1 % (ref 38–73)
NRBC BLD-RTO: 0 /100 WBC
PLATELET # BLD AUTO: 290 K/UL (ref 150–450)
PMV BLD AUTO: 9.9 FL (ref 9.2–12.9)
POTASSIUM SERPL-SCNC: 4.5 MMOL/L (ref 3.5–5.1)
PROT SERPL-MCNC: 7.3 G/DL (ref 6–8.4)
RBC # BLD AUTO: 2.93 M/UL (ref 4.6–6.2)
SODIUM SERPL-SCNC: 137 MMOL/L (ref 136–145)
TSH SERPL DL<=0.005 MIU/L-ACNC: 1.34 UIU/ML (ref 0.4–4)
WBC # BLD AUTO: 7.64 K/UL (ref 3.9–12.7)

## 2022-10-06 PROCEDURE — 91312 COVID-19, MRNA, LNP-S, BIVALENT BOOSTER, PF, 30 MCG/0.3 ML DOSE: CPT | Mod: S$GLB,,, | Performed by: INTERNAL MEDICINE

## 2022-10-06 PROCEDURE — 99999 PR PBB SHADOW E&M-EST. PATIENT-LVL V: ICD-10-PCS | Mod: PBBFAC,,, | Performed by: INTERNAL MEDICINE

## 2022-10-06 PROCEDURE — 1160F RVW MEDS BY RX/DR IN RCRD: CPT | Mod: CPTII,S$GLB,, | Performed by: INTERNAL MEDICINE

## 2022-10-06 PROCEDURE — 1101F PR PT FALLS ASSESS DOC 0-1 FALLS W/OUT INJ PAST YR: ICD-10-PCS | Mod: CPTII,S$GLB,, | Performed by: INTERNAL MEDICINE

## 2022-10-06 PROCEDURE — 0124A PR IMMUNIZ ADMIN, SARS-COV- 2 COVID-19 VACCINE, 30MCG/0.3ML, BIVALENT, BOOSTER DOSE: CPT | Mod: S$GLB,,, | Performed by: INTERNAL MEDICINE

## 2022-10-06 PROCEDURE — 1159F MED LIST DOCD IN RCRD: CPT | Mod: CPTII,S$GLB,, | Performed by: INTERNAL MEDICINE

## 2022-10-06 PROCEDURE — 84153 ASSAY OF PSA TOTAL: CPT | Performed by: INTERNAL MEDICINE

## 2022-10-06 PROCEDURE — 91312 COVID-19, MRNA, LNP-S, BIVALENT BOOSTER, PF, 30 MCG/0.3 ML DOSE: ICD-10-PCS | Mod: S$GLB,,, | Performed by: INTERNAL MEDICINE

## 2022-10-06 PROCEDURE — 3079F DIAST BP 80-89 MM HG: CPT | Mod: CPTII,S$GLB,, | Performed by: INTERNAL MEDICINE

## 2022-10-06 PROCEDURE — 3079F PR MOST RECENT DIASTOLIC BLOOD PRESSURE 80-89 MM HG: ICD-10-PCS | Mod: CPTII,S$GLB,, | Performed by: INTERNAL MEDICINE

## 2022-10-06 PROCEDURE — 80053 COMPREHEN METABOLIC PANEL: CPT | Performed by: INTERNAL MEDICINE

## 2022-10-06 PROCEDURE — 1125F PR PAIN SEVERITY QUANTIFIED, PAIN PRESENT: ICD-10-PCS | Mod: CPTII,S$GLB,, | Performed by: INTERNAL MEDICINE

## 2022-10-06 PROCEDURE — 0124A PR IMMUNIZ ADMIN, SARS-COV- 2 COVID-19 VACCINE, 30MCG/0.3ML, BIVALENT, BOOSTER DOSE: ICD-10-PCS | Mod: S$GLB,,, | Performed by: INTERNAL MEDICINE

## 2022-10-06 PROCEDURE — 85007 BL SMEAR W/DIFF WBC COUNT: CPT | Performed by: INTERNAL MEDICINE

## 2022-10-06 PROCEDURE — 85027 COMPLETE CBC AUTOMATED: CPT | Performed by: INTERNAL MEDICINE

## 2022-10-06 PROCEDURE — 1159F PR MEDICATION LIST DOCUMENTED IN MEDICAL RECORD: ICD-10-PCS | Mod: CPTII,S$GLB,, | Performed by: INTERNAL MEDICINE

## 2022-10-06 PROCEDURE — 83036 HEMOGLOBIN GLYCOSYLATED A1C: CPT | Performed by: INTERNAL MEDICINE

## 2022-10-06 PROCEDURE — 3074F PR MOST RECENT SYSTOLIC BLOOD PRESSURE < 130 MM HG: ICD-10-PCS | Mod: CPTII,S$GLB,, | Performed by: INTERNAL MEDICINE

## 2022-10-06 PROCEDURE — 36415 COLL VENOUS BLD VENIPUNCTURE: CPT | Performed by: INTERNAL MEDICINE

## 2022-10-06 PROCEDURE — 1125F AMNT PAIN NOTED PAIN PRSNT: CPT | Mod: CPTII,S$GLB,, | Performed by: INTERNAL MEDICINE

## 2022-10-06 PROCEDURE — 1101F PT FALLS ASSESS-DOCD LE1/YR: CPT | Mod: CPTII,S$GLB,, | Performed by: INTERNAL MEDICINE

## 2022-10-06 PROCEDURE — 0124A COVID-19, MRNA, LNP-S, BIVALENT BOOSTER, PF, 30 MCG/0.3 ML DOSE: CPT | Mod: PBBFAC | Performed by: INTERNAL MEDICINE

## 2022-10-06 PROCEDURE — 3288F FALL RISK ASSESSMENT DOCD: CPT | Mod: CPTII,S$GLB,, | Performed by: INTERNAL MEDICINE

## 2022-10-06 PROCEDURE — 84443 ASSAY THYROID STIM HORMONE: CPT | Performed by: INTERNAL MEDICINE

## 2022-10-06 PROCEDURE — 3074F SYST BP LT 130 MM HG: CPT | Mod: CPTII,S$GLB,, | Performed by: INTERNAL MEDICINE

## 2022-10-06 PROCEDURE — 1160F PR REVIEW ALL MEDS BY PRESCRIBER/CLIN PHARMACIST DOCUMENTED: ICD-10-PCS | Mod: CPTII,S$GLB,, | Performed by: INTERNAL MEDICINE

## 2022-10-06 PROCEDURE — 99999 PR PBB SHADOW E&M-EST. PATIENT-LVL V: CPT | Mod: PBBFAC,,, | Performed by: INTERNAL MEDICINE

## 2022-10-06 PROCEDURE — 3288F PR FALLS RISK ASSESSMENT DOCUMENTED: ICD-10-PCS | Mod: CPTII,S$GLB,, | Performed by: INTERNAL MEDICINE

## 2022-10-06 PROCEDURE — 99215 OFFICE O/P EST HI 40 MIN: CPT | Mod: S$GLB,,, | Performed by: INTERNAL MEDICINE

## 2022-10-06 PROCEDURE — 99215 PR OFFICE/OUTPT VISIT, EST, LEVL V, 40-54 MIN: ICD-10-PCS | Mod: S$GLB,,, | Performed by: INTERNAL MEDICINE

## 2022-10-06 NOTE — PROGRESS NOTES
"Subjective:       Patient ID: Garry Rooney is a 81 y.o. male.    Chief Complaint: Rib Injury    Here for urgent visit for rib pain  LCV 04/2021      82 yo M with PMHx of hypoxia, Crohn's, kindey tumor (UnTx), multiple compression fracture pain controlled with chronic opiate regimen, hx of bladder CA ( in 1990s and no longer pursuing surveillance), elevated PSA    Adiel and Garry, sons, are present today    Wife admitted to hospital and subsequent rehab after significant pelbvic Fx. During this time his self care declined. He appetite and PO intake has decreased and his energy has resolved.   Hx fo bladder CA that has not be followed  Elevated PSA that has not been followed.       PSA                      19.52 (H)           10/28/2011 11:04 AM        PSA                      16 (H)              10/14/2010 12:47 PM        PSA                      9.3 (H)             03/01/2006 12:46 PM   Decreased appetite. continued pain of right and left chest.    05/04/2021 Ct enterography done by GI for purpose of colon CA screening with hx of crohn's: Right renal mass suggestive of renal cell carcinoma. Urology evaluation recommended.     CV GI Dr Barbosa  writes in A/P "Right renal mass: We discussed that there was concern that this could be a malignant tumor. He is unsure if he wants any further evaluation of this at the current time. We discussed possibly repeating a CT scan to see if there has been any change in the size of the lesion. We also discussed a possible referral to Urology. He will let me know if he wants to do any further management. We did discuss in detail that this is potentially a malignant tumor that could spread."          CV IM saw Rekha Ryan reporting approx 3 weeks prior he was on a trip with his wife when she fell - he tried to help her up. She was in a great deal of pain so she was "hysterical and flailing" may have hit his chest. He reports that ever since this event he has had significant " "chest wall pain that is worse with palpation, arm elevation, deep breathing.  07/18/2022 CT chest Elevation of both hemidiaphragms with bibasilar airspace opacities.Subacute nondisplaced fractures of the right 5th and 7th ribs.  No associated pneumothorax. Abnormal expansion and marrow replacement of the right 6th rib. Chronic compression fractures in the lower thoracic spine with vertebral augmentation changes.    CV gastro "We discussed symptomatic management including possibly using Imodium or Lomotil to decrease the bowel frequency and urgency but I am concerned with the anastomotic stricture that this may increase his risk of developing a bowel obstruction. At this time he would like to continue his current medical therapy with the exception of increasing the Colazal dose. We discussed colonoscopy for colon cancer screening and disease activity assessment and possible dilation of the stricture and he is not interested in this at the present time. He would prefer not to have any aggressive treatments as long as he is feeling okay. He understands the risk of undiagnosed malignancy and is okay with that"    08/10/2022  Large complex fluid collection along the left aspect of the mandible, presumably a hematoma in this patient post trauma.  Repeat imaging recommended if the collection persists or enlarges.  I see no underlying fracture.    Julio César Lackey MD at 2/10/2020 "One Crohn's disease I believe the disease is in remission on our regimen.  I have not done a colonoscopy recently partly because of the risk at his age.  He has significant other issues going on in that with a change in insurance the Humira is going to become very very expensive for him.  He is thinking about stopping the humira.  We discussed stopping the medicine and what might happen.  It is certainly possible that the azathioprine could maintain his remission status for a while. 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 This has led to an O2 requirement. On O2 since 2000. Never smoked.        Review of Systems   Constitutional:  Positive for activity change and appetite change. Negative for chills, fever and unexpected weight change.   HENT:  Negative for hearing loss, sore throat and trouble swallowing.    Eyes:  Negative for visual disturbance.   Respiratory:  Negative for cough, chest tightness and shortness of breath.    Cardiovascular:  Negative for chest pain and leg swelling.   Gastrointestinal:  Negative for abdominal pain, blood in stool, constipation, diarrhea, nausea and vomiting.   Endocrine: Negative for polydipsia and polyuria.   Genitourinary:  Negative for decreased urine volume, difficulty urinating, dysuria, frequency and urgency.   Musculoskeletal:  Negative for gait problem.   Skin:  Negative for rash.   Neurological:  Negative for dizziness and numbness.   Psychiatric/Behavioral:  The patient is not nervous/anxious.      Objective:      Vitals:    10/06/22 0951   BP: 114/80   BP Location: Left arm   Height: 5' 5" (1.651 m)      Physical Exam  Vitals and nursing note reviewed.   Constitutional:       General: He is not in acute distress.     Appearance: He is well-developed. He is ill-appearing. He is not toxic-appearing or diaphoretic.   HENT:      Head: Normocephalic and atraumatic.      Mouth/Throat:      Pharynx: No oropharyngeal exudate.   Eyes:      General: No scleral icterus.     Conjunctiva/sclera: Conjunctivae normal.      Pupils: Pupils are equal, round, and reactive to light.   Neck:      Thyroid: No thyromegaly.   Cardiovascular:      Rate and Rhythm: Normal rate and regular rhythm.      Heart sounds: Normal heart sounds. No murmur heard.  Pulmonary:      Effort: Pulmonary effort is normal.      Breath sounds: Normal breath sounds. No wheezing or rales.   Abdominal:      General: There is no distension.   Musculoskeletal:         General: No tenderness.   Lymphadenopathy:      " Cervical: No cervical adenopathy.   Skin:     General: Skin is warm and dry.   Neurological:      Mental Status: He is alert and oriented to person, place, and time.   Psychiatric:         Behavior: Behavior normal.       Assessment:       1. Weight loss, unintentional    2. Colon cancer screening    3. History of bladder cancer    4. Elevated PSA    5. Other chest pain    6. Weight loss    7. Benign prostatic hyperplasia without lower urinary tract symptoms    8. Essential hypertension    9. Encounter for screening for malignant neoplasm of prostate          Plan:       Garry was seen today for rib injury.    Diagnoses and all orders for this visit:    Weight loss, unintentional   Patient last seen 04/2021.  Per chart review and recent HPI and recent labs very concern for metastatic cancer.  History of elevated PSA that has never been followed up with Urology as well as a highly suspicious renal lesion that has not been followed up with Urology.  Will update alkaline phosphatase level, PSA, CT chest/abd/pelvis, urology f/u ASAP, guarantee caloric consumption of 2K+ daily. F/u in one month for weight check but work up will continue based on these results.    Colon cancer screening    History of bladder cancer  -     CT Chest Abdomen Pelvis With Contrast (xpd); Future  -     CT Chest Abdomen Pelvis With Contrast (xpd); Future    Elevated PSA  -     CT Chest Abdomen Pelvis With Contrast (xpd); Future  -     PSA, Screening; Future  -     CT Chest Abdomen Pelvis With Contrast (xpd); Future  -     Ambulatory referral/consult to Urology; Future    Other chest pain  -     Comprehensive Metabolic Panel; Future  -     TSH; Future  -     CBC Auto Differential; Future  -     Hemoglobin A1C; Future    Weight loss  -     CT Chest Abdomen Pelvis With Contrast (xpd); Future  -     PSA, Screening; Future  -     Comprehensive Metabolic Panel; Future  -     TSH; Future  -     CBC Auto Differential; Future  -     Hemoglobin A1C;  Future  -     CT Chest Abdomen Pelvis With Contrast (xpd); Future  -     Creatinine, serum; Future  -     Ambulatory referral/consult to Urology; Future    Benign prostatic hyperplasia without lower urinary tract symptoms    Essential hypertension    Bladder CA  /f/u urology ASAP    MDD  Will f/u once stable. Do not suspect primary cause of weight loss. Mirtazapine discussed.   Drug induced immunodeficency  Chronic infection possible cause but less likely  cause of weight loss.  Encounter for screening for malignant neoplasm of prostate  -     PSA, Screening; Future       >60 minutes were spent today reviewing patient chart.  Much of today's visit was spent discussing with patient my chart review, their concerns, health maintenance, my assessment, and recommendations.      Shiv Vaca MD  Internal Medicine-[facility]        Side effects of medication(s) were discussed in detail and patient voiced understanding.  Patient will call back for any issues or complications.

## 2022-10-08 ENCOUNTER — NURSE TRIAGE (OUTPATIENT)
Dept: ADMINISTRATIVE | Facility: CLINIC | Age: 82
End: 2022-10-08
Payer: MEDICARE

## 2022-10-08 NOTE — TELEPHONE ENCOUNTER
Reason for Disposition   Requesting regular office appointment    Protocols used: Information Only Call - No Triage-A-AH    Garry's wife Anne states they got the news from MD that he has advanced CA on 12/06/2022, following testing / labs.  She tearfully states today that it was their 61st wedding anniversary.  She is requesting to hear from Shiv Trejo MD, as soon as possible that pcp appoint him with Dr Dent and Dr Chen, as they request.  Message to Dr Trejo. Please contact caller directly with any additional care advice.

## 2022-10-09 ENCOUNTER — TELEPHONE (OUTPATIENT)
Dept: INTERNAL MEDICINE | Facility: CLINIC | Age: 82
End: 2022-10-09
Payer: MEDICARE

## 2022-10-09 NOTE — TELEPHONE ENCOUNTER
Spoke with pt wife and she wanted to know your recommendations going forward. They will go to wherever you need them to go.

## 2022-10-09 NOTE — TELEPHONE ENCOUNTER
LVM stating that Dr. Trejo wanted pt back in a month per discharge notes but would be more than happy to schedule appt sooner

## 2022-10-09 NOTE — TELEPHONE ENCOUNTER
----- Message from Jewels Hodge sent at 10/8/2022  1:47 PM CDT -----  Contact: Patient wife  Type:  Sooner Appointment Request requesting 10/10/2022    Name of Caller:Patient wife (really emotional)   Patient wife stated requesting appt for 10/10/2022 patient stage 4 cancer   Would the patient rather a call back or a response via [facility]? Call back   Best Call Back Number:[phone]  Additional Information: Please assist

## 2022-10-10 RX ORDER — MEGESTROL ACETATE 40 MG/ML
400 SUSPENSION ORAL DAILY
Qty: 300 ML | Refills: 1 | Status: SHIPPED | OUTPATIENT
Start: 2022-10-10 | End: 2023-10-10

## 2022-10-10 RX ORDER — OXYCODONE AND ACETAMINOPHEN 7.5; 325 MG/1; MG/1
1 TABLET ORAL EVERY 8 HOURS PRN
Qty: 90 TABLET | Refills: 0 | Status: SHIPPED | OUTPATIENT
Start: 2022-10-10

## 2022-10-10 NOTE — TELEPHONE ENCOUNTER
Called over weekend and LVM but pt did not receive.  Plan updated.  Megace sent in. Wife requesting refill of his chronic pain medications. Will send in refill at reduced dose due to recent weight loss. R/b discussed.   Urology appt wednesday.  Spoke with IR who rec prostate Bx for tissue

## 2022-10-12 ENCOUNTER — PATIENT MESSAGE (OUTPATIENT)
Dept: INTERNAL MEDICINE | Facility: CLINIC | Age: 82
End: 2022-10-12
Payer: MEDICARE

## 2022-10-12 ENCOUNTER — PATIENT MESSAGE (OUTPATIENT)
Dept: GASTROENTEROLOGY | Facility: CLINIC | Age: 82
End: 2022-10-12
Payer: MEDICARE

## 2022-10-13 ENCOUNTER — OFFICE VISIT (OUTPATIENT)
Dept: UROLOGY | Facility: CLINIC | Age: 82
End: 2022-10-13
Attending: INTERNAL MEDICINE
Payer: MEDICARE

## 2022-10-13 ENCOUNTER — PATIENT MESSAGE (OUTPATIENT)
Dept: GASTROENTEROLOGY | Facility: CLINIC | Age: 82
End: 2022-10-13
Payer: MEDICARE

## 2022-10-13 VITALS
HEART RATE: 100 BPM | HEIGHT: 65 IN | OXYGEN SATURATION: 96 % | RESPIRATION RATE: 18 BRPM | BODY MASS INDEX: 18.33 KG/M2 | WEIGHT: 110 LBS | DIASTOLIC BLOOD PRESSURE: 55 MMHG | SYSTOLIC BLOOD PRESSURE: 96 MMHG

## 2022-10-13 DIAGNOSIS — R97.20 ELEVATED PSA: ICD-10-CM

## 2022-10-13 DIAGNOSIS — Z85.51 HISTORY OF BLADDER CANCER: ICD-10-CM

## 2022-10-13 DIAGNOSIS — R35.0 URINARY FREQUENCY: Primary | ICD-10-CM

## 2022-10-13 DIAGNOSIS — N28.89 RENAL MASS: ICD-10-CM

## 2022-10-13 DIAGNOSIS — R63.4 WEIGHT LOSS: ICD-10-CM

## 2022-10-13 PROCEDURE — 1100F PR PT FALLS ASSESS DOC 2+ FALLS/FALL W/INJURY/YR: ICD-10-PCS | Mod: CPTII,S$GLB,, | Performed by: NURSE PRACTITIONER

## 2022-10-13 PROCEDURE — 1159F MED LIST DOCD IN RCRD: CPT | Mod: CPTII,S$GLB,, | Performed by: NURSE PRACTITIONER

## 2022-10-13 PROCEDURE — 3078F DIAST BP <80 MM HG: CPT | Mod: CPTII,S$GLB,, | Performed by: NURSE PRACTITIONER

## 2022-10-13 PROCEDURE — 1159F PR MEDICATION LIST DOCUMENTED IN MEDICAL RECORD: ICD-10-PCS | Mod: CPTII,S$GLB,, | Performed by: NURSE PRACTITIONER

## 2022-10-13 PROCEDURE — 3288F FALL RISK ASSESSMENT DOCD: CPT | Mod: CPTII,S$GLB,, | Performed by: NURSE PRACTITIONER

## 2022-10-13 PROCEDURE — 87088 URINE BACTERIA CULTURE: CPT | Performed by: NURSE PRACTITIONER

## 2022-10-13 PROCEDURE — 1160F RVW MEDS BY RX/DR IN RCRD: CPT | Mod: CPTII,S$GLB,, | Performed by: NURSE PRACTITIONER

## 2022-10-13 PROCEDURE — 87086 URINE CULTURE/COLONY COUNT: CPT | Performed by: NURSE PRACTITIONER

## 2022-10-13 PROCEDURE — 1160F PR REVIEW ALL MEDS BY PRESCRIBER/CLIN PHARMACIST DOCUMENTED: ICD-10-PCS | Mod: CPTII,S$GLB,, | Performed by: NURSE PRACTITIONER

## 2022-10-13 PROCEDURE — 99214 OFFICE O/P EST MOD 30 MIN: CPT | Mod: S$GLB,,, | Performed by: NURSE PRACTITIONER

## 2022-10-13 PROCEDURE — 87186 SC STD MICRODIL/AGAR DIL: CPT | Performed by: NURSE PRACTITIONER

## 2022-10-13 PROCEDURE — 1100F PTFALLS ASSESS-DOCD GE2>/YR: CPT | Mod: CPTII,S$GLB,, | Performed by: NURSE PRACTITIONER

## 2022-10-13 PROCEDURE — 99214 PR OFFICE/OUTPT VISIT, EST, LEVL IV, 30-39 MIN: ICD-10-PCS | Mod: S$GLB,,, | Performed by: NURSE PRACTITIONER

## 2022-10-13 PROCEDURE — 3074F SYST BP LT 130 MM HG: CPT | Mod: CPTII,S$GLB,, | Performed by: NURSE PRACTITIONER

## 2022-10-13 PROCEDURE — 3288F PR FALLS RISK ASSESSMENT DOCUMENTED: ICD-10-PCS | Mod: CPTII,S$GLB,, | Performed by: NURSE PRACTITIONER

## 2022-10-13 PROCEDURE — 87077 CULTURE AEROBIC IDENTIFY: CPT | Performed by: NURSE PRACTITIONER

## 2022-10-13 PROCEDURE — 3078F PR MOST RECENT DIASTOLIC BLOOD PRESSURE < 80 MM HG: ICD-10-PCS | Mod: CPTII,S$GLB,, | Performed by: NURSE PRACTITIONER

## 2022-10-13 PROCEDURE — 3074F PR MOST RECENT SYSTOLIC BLOOD PRESSURE < 130 MM HG: ICD-10-PCS | Mod: CPTII,S$GLB,, | Performed by: NURSE PRACTITIONER

## 2022-10-13 NOTE — PROGRESS NOTES
"Subjective:      Garry Rooney is a 81 y.o. male who was referred by Dr. Trejo for evaluation of his elevated PSA. He presents today with his wife and son.     History of bladder cancer (in 1990s and no longer pursuing surveillance). Also with a history of a kidney tumor- untreated as well. Last seen by a urologist in the 90s.    Elevated PSA  Patient is here with an elevated PSA. He has no personal history and no family history of prostate cancer. Denies bothersome urinary symptoms. He has no prior genitourinary history of hematuria, UTI, urolithiasis, epididymal orchitis.  Previous PSA values are :  Lab Results   Component Value Date    PSA >1,000.0 (H) 10/06/2022    PSA 19.52 (H) 10/28/2011    PSA 16 (H) 10/14/2010     CT chest/abdomen/pelvis (10/7/22)- "Markedly abnormal appearance of the bone suggestive of widespread osseous metastatic disease.  Enlarged prostate gland with heterogeneity of the prostate density.  Given the suspected widespread osseous metastatic disease, underlying prostate carcinoma should be considered.  Stable appearance to the solid appearing mass within the mid right kidney anteriorly measuring approximately 2.7 x 2.2 cm in size.  Once again, the possibility of renal neoplasm cannot be excluded."    Reports significant weight loss over the last several months. Also with pelvic and back pain. Denies difficulty voiding. Denies dysuria and gross hematuria.     The following portions of the patient's history were reviewed and updated as appropriate: allergies, current medications, past family history, past medical history, past social history, past surgical history and problem list.    Review of Systems  Constitutional: no fever or chills  ENT: no nasal congestion or sore throat  Respiratory: no cough or shortness of breath  Cardiovascular: no chest pain or palpitations  Gastrointestinal: no nausea or vomiting, tolerating diet  Genitourinary: as per HPI  Hematologic/Lymphatic: no easy " "bruising or lymphadenopathy  Musculoskeletal: positive for back pain, bone pain, and muscle weakness  Neurological: no seizures or tremors  Behavioral/Psych: no auditory or visual hallucinations     Objective:   Vitals: BP (!) 96/55 (BP Location: Right arm, Patient Position: Sitting, BP Method: Large (Automatic))   Pulse 100   Resp 18   Ht 5' 5" (1.651 m)   Wt 49.9 kg (110 lb)   SpO2 96%   BMI 18.30 kg/m²     Physical Exam   General: cachetic and alert and oriented  Head: normocephalic, atraumatic  Neck: supple, normal ROM  Respiratory: Symmetric expansion, non-labored breathing; on home 02  Cardiovascular: regular rate and rhythm  Abdomen: soft, non tender, non distended,  Genitourinary: deferred  Skin: normal coloration and turgor, no rashes, no suspicious skin lesions noted  Neuro: alert and oriented x3, no gross deficits  Psych: normal judgment and insight, normal mood/affect, and non-anxious    Lab Review   Urinalysis demonstrates: no sample   Lab Results   Component Value Date    WBC 7.64 10/06/2022    HGB 9.5 (L) 10/06/2022    HCT 29.4 (L) 10/06/2022     (H) 10/06/2022     10/06/2022     Lab Results   Component Value Date    CREATININE 1.0 10/06/2022    BUN 50 (H) 10/06/2022     Lab Results   Component Value Date    PSA >1,000.0 (H) 10/06/2022     Imaging   (all images personally reviewed; agree with report below)  CT chest/abdomen/pelvis (10/7/22)- "Markedly abnormal appearance of the bone suggestive of widespread osseous metastatic disease.  Enlarged prostate gland with heterogeneity of the prostate density.  Given the suspected widespread osseous metastatic disease, underlying prostate carcinoma should be considered.  Stable appearance to the solid appearing mass within the mid right kidney anteriorly measuring approximately 2.7 x 2.2 cm in size.  Once again, the possibility of renal neoplasm cannot be excluded."    Assessment:     1. Urinary frequency    2. Elevated PSA    3. Weight " loss    4. History of bladder cancer    5. Renal mass      Plan:   Garry was seen today for elevated psa.    Diagnoses and all orders for this visit:    Urinary frequency  -     Urine culture    Elevated PSA  -     Ambulatory referral/consult to Urology  -     Urine culture  -     Transrectal Ultrasound w/ Biopsy; Future  -     degarelix (FIRMAGON) injection 240 mg  -     Prior authorization Order    Weight loss  -     Ambulatory referral/consult to Urology    History of bladder cancer    Renal mass    Plan:  --Discussed lab and imaging results  --TRUS/bx asap (reviewed instruction handout)  --Urine culture today  --Pt currently takes cipro 500 mg daily for his crohn's- instructed to begin taking BID the day before the biopsy   --Firmagon 240 mg and med auth ordered today

## 2022-10-13 NOTE — TELEPHONE ENCOUNTER
Msg copied from separate encounter :  Next, we have made an appointment with the hematologist, Dr. Chen, at 3 pm on Monday, October 17.   Is that appointment premature? If so, please advise what we need to do before my father can see Dr. Chen.     Thank you; we look forward to hearing from you.

## 2022-10-13 NOTE — TELEPHONE ENCOUNTER
I spoke with the patient and his son today over the phone.  We discussed the current issues going on with his pathologic rib fractures and evidence of likely metastatic prostate cancer.  He has been doing well from a Crohn's disease standpoint.  If anything he feels like he has actually been doing better than he was previously.  He has been off of Colazal but has been taking azathioprine and Humira.  Today we discussed what to do with these medications in the setting of evidence of metastatic cancer.  We discussed the evidence regarding the impact of TNF inhibitors on solid tumors.  We also discussed the fact that the azathioprine may not be providing a whole lot of benefit at this time.  We discussed alternatives to the use of Humira including the use of Entyvio and Stelara and specifically discuss the safety profile of these medications with regards to the risks leukemia and lymphoma as well as the risk of infections.  After our discussion we will plan to stop the azathioprine and continue Humira.  He has an appointment with Urology today and Oncology on Monday to discuss further treatment options for his malignancy.  Based on the plans for treatment we may need to consider stopping Humira and using 1 of the other treatment options.  We will reassess this need next week.  We will plan to cancel his appointment for Monday.

## 2022-10-13 NOTE — TELEPHONE ENCOUNTER
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      They are going to see the urologist this afternoon to come up with a plan.    His health has declined steadily over the last month as his wife fell and broke her pelvis while on a trip and she is his primary caregiver.  He has lost weight and has no appetite (was recently prescribed Megace and it seems to be helping some)    He is going to speak with his father later this morning to discuss his GI symptoms, but is wanting to know if it is necessary to keep his appt with Dr. Barbosa on 10/17 with everything else that is going on.  I explained that my concern was that he is on a biologic therapy drug and others and that the stress of the current situation may cause a flare and that Dr. Barbosa's schedule is fully booked right now, but that I would defer to Dr. Barbosa for that and would let him know.  He thanked me and we hung up.    Will route to Dr. Barbosa

## 2022-10-16 ENCOUNTER — PATIENT MESSAGE (OUTPATIENT)
Dept: UROLOGY | Facility: CLINIC | Age: 82
End: 2022-10-16
Payer: MEDICARE

## 2022-10-16 LAB — BACTERIA UR CULT: ABNORMAL

## 2022-10-17 ENCOUNTER — PATIENT MESSAGE (OUTPATIENT)
Dept: INTERNAL MEDICINE | Facility: CLINIC | Age: 82
End: 2022-10-17
Payer: MEDICARE

## 2022-10-17 ENCOUNTER — TELEPHONE (OUTPATIENT)
Dept: UROLOGY | Facility: CLINIC | Age: 82
End: 2022-10-17

## 2022-10-17 ENCOUNTER — PATIENT MESSAGE (OUTPATIENT)
Dept: UROLOGY | Facility: CLINIC | Age: 82
End: 2022-10-17
Payer: MEDICARE

## 2022-10-17 DIAGNOSIS — N30.00 ACUTE CYSTITIS WITHOUT HEMATURIA: Primary | ICD-10-CM

## 2022-10-17 RX ORDER — CEFDINIR 300 MG/1
300 CAPSULE ORAL 2 TIMES DAILY
Qty: 20 CAPSULE | Refills: 0 | Status: SHIPPED | OUTPATIENT
Start: 2022-10-17 | End: 2022-10-27

## 2023-01-23 ENCOUNTER — PATIENT MESSAGE (OUTPATIENT)
Dept: INTERNAL MEDICINE | Facility: CLINIC | Age: 83
End: 2023-01-23
Payer: MEDICARE

## 2023-01-23 DIAGNOSIS — R09.02 HYPOXEMIA: Primary | ICD-10-CM

## 2023-01-23 DIAGNOSIS — J98.6 DIAPHRAGMATIC DISORDER: ICD-10-CM

## 2023-01-23 DIAGNOSIS — R06.00 DYSPNEA, UNSPECIFIED TYPE: ICD-10-CM

## 2023-01-24 NOTE — TELEPHONE ENCOUNTER
I did not jessica pt as  in case this messes something up for this request.    Pt does need to be marked as  it seems from portal message from son on 10/17/22

## 2023-05-10 NOTE — TELEPHONE ENCOUNTER
----- Message from Julio César Lackey MD sent at 3/6/2017 12:42 PM CST -----  Contact: [facility] - [phone]  12 syringes, 6 mo supply sent to [facility]  ----- Message -----     From: Cee Nichols MA     Sent: 3/6/2017  11:26 AM       To: Julio César Lackey MD        ----- Message -----     From: Tara Katz     Sent: 3/6/2017  10:41 AM       To: Ziyad Lackey - pt going out of town - needs refills on humira - is completely out of it - needs 6 month supply - needs new rx for 6 month supply - please call [facility] - [phone]    
Override to fill 2 - 3 month supplies of Humira on 3/6/17 was obtained by Minesh Landaverde. Each went through for $55 copays. Case ID# [id number]    LM to inform wife of Humira 6mth override approval.   
done

## 2023-07-21 NOTE — TELEPHONE ENCOUNTER
Moved out of IM department and it seems orders were handled separately - so I am cancelling pended orders.Closing charts.

## 2023-11-06 ENCOUNTER — PATIENT MESSAGE (OUTPATIENT)
Dept: ADMINISTRATIVE | Facility: HOSPITAL | Age: 83
End: 2023-11-06
Payer: MEDICARE

## 2025-01-17 ENCOUNTER — PATIENT OUTREACH (OUTPATIENT)
Dept: ADMINISTRATIVE | Facility: HOSPITAL | Age: 85
End: 2025-01-17
Payer: MEDICARE

## 2025-05-02 NOTE — TELEPHONE ENCOUNTER
----- Message from Julio César Lackey MD sent at 4/7/2020 10:32 AM CDT -----  Regarding: RE: Azathioprine rx needed  done  ----- Message -----  From: Cee Nichols MA  Sent: 4/7/2020  10:26 AM CDT  To: Julio César Lackey MD  Subject: FW: Azathioprine rx needed                           ----- Message -----  From: Slime Larson  Sent: 4/7/2020  10:18 AM CDT  To: Julio César Lackey MD, Ziyad WATKINS Staff  Subject: Azathioprine rx needed                           FYI:  Patient is currently staying in [place] due to COVID 19. Patient's wife requested that azathioprine prescription be sent to [facility] mail order pharmacy. Please send prescription to [facility] pharmacy, which is listed in the patient's EPIC profile.     Thanks,   Slime Larson x [phone]  Patient Care Advocate  [facility]  [phone]      [place]